# Patient Record
Sex: FEMALE | Race: WHITE | NOT HISPANIC OR LATINO | Employment: OTHER | URBAN - METROPOLITAN AREA
[De-identification: names, ages, dates, MRNs, and addresses within clinical notes are randomized per-mention and may not be internally consistent; named-entity substitution may affect disease eponyms.]

---

## 2020-06-04 ENCOUNTER — OFFICE VISIT (OUTPATIENT)
Dept: FAMILY MEDICINE CLINIC | Facility: CLINIC | Age: 75
End: 2020-06-04
Payer: COMMERCIAL

## 2020-06-04 VITALS
DIASTOLIC BLOOD PRESSURE: 72 MMHG | HEIGHT: 64 IN | TEMPERATURE: 99.8 F | HEART RATE: 89 BPM | WEIGHT: 131 LBS | BODY MASS INDEX: 22.36 KG/M2 | SYSTOLIC BLOOD PRESSURE: 110 MMHG | OXYGEN SATURATION: 96 %

## 2020-06-04 DIAGNOSIS — Z13.1 SCREENING FOR DIABETES MELLITUS (DM): ICD-10-CM

## 2020-06-04 DIAGNOSIS — Z13.220 SCREENING FOR HYPERLIPIDEMIA: ICD-10-CM

## 2020-06-04 DIAGNOSIS — Z13.0 SCREENING FOR DEFICIENCY ANEMIA: ICD-10-CM

## 2020-06-04 DIAGNOSIS — Z72.0 TOBACCO USE: Primary | ICD-10-CM

## 2020-06-04 DIAGNOSIS — Z00.00 MEDICARE ANNUAL WELLNESS VISIT, SUBSEQUENT: ICD-10-CM

## 2020-06-04 DIAGNOSIS — Z11.59 NEED FOR HEPATITIS C SCREENING TEST: ICD-10-CM

## 2020-06-04 DIAGNOSIS — Z80.3 FAMILY HISTORY OF BREAST CANCER: ICD-10-CM

## 2020-06-04 PROCEDURE — 99204 OFFICE O/P NEW MOD 45 MIN: CPT | Performed by: FAMILY MEDICINE

## 2020-06-04 PROCEDURE — 3008F BODY MASS INDEX DOCD: CPT | Performed by: FAMILY MEDICINE

## 2020-06-04 PROCEDURE — G0439 PPPS, SUBSEQ VISIT: HCPCS | Performed by: FAMILY MEDICINE

## 2020-06-04 PROCEDURE — 1160F RVW MEDS BY RX/DR IN RCRD: CPT | Performed by: FAMILY MEDICINE

## 2020-06-04 PROCEDURE — 4004F PT TOBACCO SCREEN RCVD TLK: CPT | Performed by: FAMILY MEDICINE

## 2020-06-04 PROCEDURE — 1125F AMNT PAIN NOTED PAIN PRSNT: CPT | Performed by: FAMILY MEDICINE

## 2020-06-04 PROCEDURE — 1170F FXNL STATUS ASSESSED: CPT | Performed by: FAMILY MEDICINE

## 2020-06-04 RX ORDER — CEPHALEXIN 500 MG/1
CAPSULE ORAL
COMMUNITY
Start: 2012-08-11 | End: 2020-06-04

## 2020-06-04 RX ORDER — DOXYCYCLINE HYCLATE 100 MG/1
CAPSULE ORAL
COMMUNITY
Start: 2012-08-13 | End: 2020-06-04

## 2020-06-04 RX ORDER — VARENICLINE TARTRATE 25 MG
KIT ORAL
Qty: 53 TABLET | Refills: 0 | Status: SHIPPED | OUTPATIENT
Start: 2020-06-04 | End: 2021-08-26

## 2020-07-06 DIAGNOSIS — Z72.0 TOBACCO USE: ICD-10-CM

## 2020-07-06 RX ORDER — VARENICLINE TARTRATE 25 MG
KIT ORAL
Qty: 53 TABLET | Refills: 0 | Status: CANCELLED | OUTPATIENT
Start: 2020-07-06

## 2020-07-06 RX ORDER — VARENICLINE TARTRATE 1 MG/1
1 TABLET, FILM COATED ORAL 2 TIMES DAILY
Qty: 60 TABLET | Refills: 5 | Status: SHIPPED | OUTPATIENT
Start: 2020-07-06 | End: 2021-08-26

## 2020-07-17 LAB — HCV AB SER-ACNC: NEGATIVE

## 2021-03-08 ENCOUNTER — TELEPHONE (OUTPATIENT)
Dept: FAMILY MEDICINE CLINIC | Facility: CLINIC | Age: 76
End: 2021-03-08

## 2021-03-08 NOTE — TELEPHONE ENCOUNTER
LM for pt RCB, she is due for an office visit and mammogram, Colonoscopy  Please offer her an appt and ask her is she had tests done and where  Thank you

## 2021-03-18 ENCOUNTER — IMMUNIZATIONS (OUTPATIENT)
Dept: FAMILY MEDICINE CLINIC | Facility: HOSPITAL | Age: 76
End: 2021-03-18

## 2021-03-18 DIAGNOSIS — Z23 ENCOUNTER FOR IMMUNIZATION: Primary | ICD-10-CM

## 2021-03-18 PROCEDURE — 91300 SARS-COV-2 / COVID-19 MRNA VACCINE (PFIZER-BIONTECH) 30 MCG: CPT

## 2021-03-18 PROCEDURE — 0001A SARS-COV-2 / COVID-19 MRNA VACCINE (PFIZER-BIONTECH) 30 MCG: CPT

## 2021-04-08 ENCOUNTER — IMMUNIZATIONS (OUTPATIENT)
Dept: FAMILY MEDICINE CLINIC | Facility: HOSPITAL | Age: 76
End: 2021-04-08

## 2021-04-08 DIAGNOSIS — Z23 ENCOUNTER FOR IMMUNIZATION: Primary | ICD-10-CM

## 2021-04-08 PROCEDURE — 0002A SARS-COV-2 / COVID-19 MRNA VACCINE (PFIZER-BIONTECH) 30 MCG: CPT

## 2021-04-08 PROCEDURE — 91300 SARS-COV-2 / COVID-19 MRNA VACCINE (PFIZER-BIONTECH) 30 MCG: CPT

## 2021-05-25 ENCOUNTER — TELEPHONE (OUTPATIENT)
Dept: FAMILY MEDICINE CLINIC | Facility: CLINIC | Age: 76
End: 2021-05-25

## 2021-05-25 NOTE — TELEPHONE ENCOUNTER
Patient declines to schedule any appointments, including her AWV, mammogram and colonoscopy  She was last seen new patient on 06/04/2020  Will be due for AWV after 06/04/2021

## 2021-06-24 ENCOUNTER — VBI (OUTPATIENT)
Dept: ADMINISTRATIVE | Facility: OTHER | Age: 76
End: 2021-06-24

## 2021-07-29 ENCOUNTER — VBI (OUTPATIENT)
Dept: ADMINISTRATIVE | Facility: OTHER | Age: 76
End: 2021-07-29

## 2021-08-26 ENCOUNTER — OFFICE VISIT (OUTPATIENT)
Dept: FAMILY MEDICINE CLINIC | Facility: CLINIC | Age: 76
End: 2021-08-26
Payer: COMMERCIAL

## 2021-08-26 ENCOUNTER — TELEPHONE (OUTPATIENT)
Dept: FAMILY MEDICINE CLINIC | Facility: CLINIC | Age: 76
End: 2021-08-26

## 2021-08-26 VITALS
HEIGHT: 64 IN | WEIGHT: 143.2 LBS | SYSTOLIC BLOOD PRESSURE: 110 MMHG | DIASTOLIC BLOOD PRESSURE: 72 MMHG | BODY MASS INDEX: 24.45 KG/M2 | OXYGEN SATURATION: 99 % | HEART RATE: 81 BPM

## 2021-08-26 DIAGNOSIS — M79.604 PAIN IN RIGHT LEG: ICD-10-CM

## 2021-08-26 DIAGNOSIS — Z13.220 LIPID SCREENING: ICD-10-CM

## 2021-08-26 DIAGNOSIS — R63.5 WEIGHT GAIN: ICD-10-CM

## 2021-08-26 DIAGNOSIS — Z80.3 FAMILY HISTORY OF BREAST CANCER: ICD-10-CM

## 2021-08-26 DIAGNOSIS — G62.9 NEUROPATHY: ICD-10-CM

## 2021-08-26 DIAGNOSIS — Z00.00 MEDICARE ANNUAL WELLNESS VISIT, SUBSEQUENT: ICD-10-CM

## 2021-08-26 DIAGNOSIS — L03.115 CELLULITIS OF RIGHT LOWER EXTREMITY: Primary | ICD-10-CM

## 2021-08-26 DIAGNOSIS — R25.2 LEG CRAMPS: ICD-10-CM

## 2021-08-26 PROCEDURE — 1160F RVW MEDS BY RX/DR IN RCRD: CPT | Performed by: FAMILY MEDICINE

## 2021-08-26 PROCEDURE — 1170F FXNL STATUS ASSESSED: CPT | Performed by: FAMILY MEDICINE

## 2021-08-26 PROCEDURE — 3725F SCREEN DEPRESSION PERFORMED: CPT | Performed by: FAMILY MEDICINE

## 2021-08-26 PROCEDURE — 3288F FALL RISK ASSESSMENT DOCD: CPT | Performed by: FAMILY MEDICINE

## 2021-08-26 PROCEDURE — 1101F PT FALLS ASSESS-DOCD LE1/YR: CPT | Performed by: FAMILY MEDICINE

## 2021-08-26 PROCEDURE — 1125F AMNT PAIN NOTED PAIN PRSNT: CPT | Performed by: FAMILY MEDICINE

## 2021-08-26 PROCEDURE — 99214 OFFICE O/P EST MOD 30 MIN: CPT | Performed by: FAMILY MEDICINE

## 2021-08-26 PROCEDURE — 3008F BODY MASS INDEX DOCD: CPT | Performed by: FAMILY MEDICINE

## 2021-08-26 PROCEDURE — G0439 PPPS, SUBSEQ VISIT: HCPCS | Performed by: FAMILY MEDICINE

## 2021-08-26 PROCEDURE — 1036F TOBACCO NON-USER: CPT | Performed by: FAMILY MEDICINE

## 2021-08-26 RX ORDER — DOXYCYCLINE HYCLATE 100 MG/1
100 CAPSULE ORAL EVERY 12 HOURS SCHEDULED
Qty: 20 CAPSULE | Refills: 0 | Status: SHIPPED | OUTPATIENT
Start: 2021-08-26 | End: 2021-09-05

## 2021-08-26 NOTE — PATIENT INSTRUCTIONS
Medicare Preventive Visit Patient Instructions  Thank you for completing your Welcome to Medicare Visit or Medicare Annual Wellness Visit today  Your next wellness visit will be due in one year (8/27/2022)  The screening/preventive services that you may require over the next 5-10 years are detailed below  Some tests may not apply to you based off risk factors and/or age  Screening tests ordered at today's visit but not completed yet may show as past due  Also, please note that scanned in results may not display below  Preventive Screenings:  Service Recommendations Previous Testing/Comments   Colorectal Cancer Screening  * Colonoscopy    * Fecal Occult Blood Test (FOBT)/Fecal Immunochemical Test (FIT)  * Fecal DNA/Cologuard Test  * Flexible Sigmoidoscopy Age: 54-65 years old   Colonoscopy: every 10 years (may be performed more frequently if at higher risk)  OR  FOBT/FIT: every 1 year  OR  Cologuard: every 3 years  OR  Sigmoidoscopy: every 5 years  Screening may be recommended earlier than age 48 if at higher risk for colorectal cancer  Also, an individualized decision between you and your healthcare provider will decide whether screening between the ages of 74-80 would be appropriate  Colonoscopy: Not on file  FOBT/FIT: Not on file  Cologuard: Not on file  Sigmoidoscopy: Not on file          Breast Cancer Screening Age: 36 years old  Frequency: every 1-2 years  Not required if history of left and right mastectomy Mammogram: Not on file        Cervical Cancer Screening Between the ages of 21-29, pap smear recommended once every 3 years  Between the ages of 33-67, can perform pap smear with HPV co-testing every 5 years     Recommendations may differ for women with a history of total hysterectomy, cervical cancer, or abnormal pap smears in past  Pap Smear: Not on file    Screening Not Indicated   Hepatitis C Screening Once for adults born between [facility]  More frequently in patients at high risk for Hepatitis C Hep C Antibody: 07/17/2020    Screening Current   Diabetes Screening 1-2 times per year if you're at risk for diabetes or have pre-diabetes Fasting glucose: No results in last 5 years   A1C: No results in last 5 years        Cholesterol Screening Once every 5 years if you don't have a lipid disorder  May order more often based on risk factors  Lipid panel: 07/17/2020    Screening Current     Other Preventive Screenings Covered by Medicare:  1  Abdominal Aortic Aneurysm (AAA) Screening: covered once if your at risk  You're considered to be at risk if you have a family history of AAA  2  Lung Cancer Screening: covers low dose CT scan once per year if you meet all of the following conditions: (1) Age 50-69; (2) No signs or symptoms of lung cancer; (3) Current smoker or have quit smoking within the last 15 years; (4) You have a tobacco smoking history of at least 30 pack years (packs per day multiplied by number of years you smoked); (5) You get a written order from a healthcare provider  3  Glaucoma Screening: covered annually if you're considered high risk: (1) You have diabetes OR (2) Family history of glaucoma OR (3)  aged 48 and older OR (3)  American aged 72 and older  3  Osteoporosis Screening: covered every 2 years if you meet one of the following conditions: (1) You're estrogen deficient and at risk for osteoporosis based off medical history and other findings; (2) Have a vertebral abnormality; (3) On glucocorticoid therapy for more than 3 months; (4) Have primary hyperparathyroidism; (5) On osteoporosis medications and need to assess response to drug therapy  · Last bone density test (DXA Scan): Not on file  5  HIV Screening: covered annually if you're between the age of 12-76  Also covered annually if you are younger than 13 and older than 72 with risk factors for HIV infection   For pregnant patients, it is covered up to 3 times per pregnancy  Immunizations:  Immunization Recommendations   Influenza Vaccine Annual influenza vaccination during flu season is recommended for all persons aged >= 6 months who do not have contraindications   Pneumococcal Vaccine (Prevnar and Pneumovax)  * Prevnar = PCV13  * Pneumovax = PPSV23   Adults 25-60 years old: 1-3 doses may be recommended based on certain risk factors  Adults 72 years old: Prevnar (PCV13) vaccine recommended followed by Pneumovax (PPSV23) vaccine  If already received PPSV23 since turning 65, then PCV13 recommended at least one year after PPSV23 dose  Hepatitis B Vaccine 3 dose series if at intermediate or high risk (ex: diabetes, end stage renal disease, liver disease)   Tetanus (Td) Vaccine - COST NOT COVERED BY MEDICARE PART B Following completion of primary series, a booster dose should be given every 10 years to maintain immunity against tetanus  Td may also be given as tetanus wound prophylaxis  Tdap Vaccine - COST NOT COVERED BY MEDICARE PART B Recommended at least once for all adults  For pregnant patients, recommended with each pregnancy  Shingles Vaccine (Shingrix) - COST NOT COVERED BY MEDICARE PART B  2 shot series recommended in those aged 48 and above     Health Maintenance Due:      Topic Date Due    Breast Cancer Screening: Mammogram  Never done    Colorectal Cancer Screening  Never done    Hepatitis C Screening  Completed     Immunizations Due:      Topic Date Due    Pneumococcal Vaccine: 65+ Years (1 of 1 - PPSV23) Never done    DTaP,Tdap,and Td Vaccines (2 - Td or Tdap) 10/15/2020    Influenza Vaccine (1) 09/01/2021     Advance Directives   What are advance directives? Advance directives are legal documents that state your wishes and plans for medical care  These plans are made ahead of time in case you lose your ability to make decisions for yourself   Advance directives can apply to any medical decision, such as the treatments you want, and if you want to donate organs  What are the types of advance directives? There are many types of advance directives, and each state has rules about how to use them  You may choose a combination of any of the following:  · Living will: This is a written record of the treatment you want  You can also choose which treatments you do not want, which to limit, and which to stop at a certain time  This includes surgery, medicine, IV fluid, and tube feedings  · Durable power of  for healthcare [facility]): This is a written record that states who you want to make healthcare choices for you when you are unable to make them for yourself  This person, called a proxy, is usually a family member or a friend  You may choose more than 1 proxy  · Do not resuscitate (DNR) order:  A DNR order is used in case your heart stops beating or you stop breathing  It is a request not to have certain forms of treatment, such as CPR  A DNR order may be included in other types of advance directives  · Medical directive: This covers the care that you want if you are in a coma, near death, or unable to make decisions for yourself  You can list the treatments you want for each condition  Treatment may include pain medicine, surgery, blood transfusions, dialysis, IV or tube feedings, and a ventilator (breathing machine)  · Values history: This document has questions about your views, beliefs, and how you feel and think about life  This information can help others choose the care that you would choose  Why are advance directives important? An advance directive helps you control your care  Although spoken wishes may be used, it is better to have your wishes written down  Spoken wishes can be misunderstood, or not followed  Treatments may be given even if you do not want them  An advance directive may make it easier for your family to make difficult choices about your care        © Copyright [employer] 2018 Information is for End User's use only and may not be sold, redistributed or otherwise used for commercial purposes  All illustrations and images included in CareNotes® are the copyrighted property of [employer] , [employer]  or [facility] Preventive Visit Patient Instructions  Thank you for completing your Welcome to Medicare Visit or Medicare Annual Wellness Visit today  Your next wellness visit will be due in one year (8/27/2022)  The screening/preventive services that you may require over the next 5-10 years are detailed below  Some tests may not apply to you based off risk factors and/or age  Screening tests ordered at today's visit but not completed yet may show as past due  Also, please note that scanned in results may not display below  Preventive Screenings:  Service Recommendations Previous Testing/Comments   Colorectal Cancer Screening  * Colonoscopy    * Fecal Occult Blood Test (FOBT)/Fecal Immunochemical Test (FIT)  * Fecal DNA/Cologuard Test  * Flexible Sigmoidoscopy Age: 54-65 years old   Colonoscopy: every 10 years (may be performed more frequently if at higher risk)  OR  FOBT/FIT: every 1 year  OR  Cologuard: every 3 years  OR  Sigmoidoscopy: every 5 years  Screening may be recommended earlier than age 48 if at higher risk for colorectal cancer  Also, an individualized decision between you and your healthcare provider will decide whether screening between the ages of 74-80 would be appropriate  Colonoscopy: Not on file  FOBT/FIT: Not on file  Cologuard: Not on file  Sigmoidoscopy: Not on file          Breast Cancer Screening Age: 36 years old  Frequency: every 1-2 years  Not required if history of left and right mastectomy Mammogram: Not on file        Cervical Cancer Screening Between the ages of 21-29, pap smear recommended once every 3 years  Between the ages of 33-67, can perform pap smear with HPV co-testing every 5 years     Recommendations may differ for women with a history of total hysterectomy, cervical cancer, or abnormal pap smears in past  Pap Smear: Not on file    Screening Not Indicated   Hepatitis C Screening Once for adults born between 1945 and 1965  More frequently in patients at high risk for Hepatitis C Hep C Antibody: 07/17/2020    Screening Current   Diabetes Screening 1-2 times per year if you're at risk for diabetes or have pre-diabetes Fasting glucose: No results in last 5 years   A1C: No results in last 5 years        Cholesterol Screening Once every 5 years if you don't have a lipid disorder  May order more often based on risk factors  Lipid panel: 07/17/2020    Screening Current     Other Preventive Screenings Covered by Medicare:  6  Abdominal Aortic Aneurysm (AAA) Screening: covered once if your at risk  You're considered to be at risk if you have a family history of AAA  7  Lung Cancer Screening: covers low dose CT scan once per year if you meet all of the following conditions: (1) Age 50-69; (2) No signs or symptoms of lung cancer; (3) Current smoker or have quit smoking within the last 15 years; (4) You have a tobacco smoking history of at least 30 pack years (packs per day multiplied by number of years you smoked); (5) You get a written order from a healthcare provider  8  Glaucoma Screening: covered annually if you're considered high risk: (1) You have diabetes OR (2) Family history of glaucoma OR (3)  aged 48 and older OR (3)  American aged 72 and older  5  Osteoporosis Screening: covered every 2 years if you meet one of the following conditions: (1) You're estrogen deficient and at risk for osteoporosis based off medical history and other findings; (2) Have a vertebral abnormality; (3) On glucocorticoid therapy for more than 3 months; (4) Have primary hyperparathyroidism; (5) On osteoporosis medications and need to assess response to drug therapy  · Last bone density test (DXA Scan): Not on file  10  HIV Screening: covered annually if you're between the age of 12-76  Also covered annually if you are younger than 13 and older than 72 with risk factors for HIV infection  For pregnant patients, it is covered up to 3 times per pregnancy  Immunizations:  Immunization Recommendations   Influenza Vaccine Annual influenza vaccination during flu season is recommended for all persons aged >= 6 months who do not have contraindications   Pneumococcal Vaccine (Prevnar and Pneumovax)  * Prevnar = PCV13  * Pneumovax = PPSV23   Adults 25-60 years old: 1-3 doses may be recommended based on certain risk factors  Adults 72 years old: Prevnar (PCV13) vaccine recommended followed by Pneumovax (PPSV23) vaccine  If already received PPSV23 since turning 65, then PCV13 recommended at least one year after PPSV23 dose  Hepatitis B Vaccine 3 dose series if at intermediate or high risk (ex: diabetes, end stage renal disease, liver disease)   Tetanus (Td) Vaccine - COST NOT COVERED BY MEDICARE PART B Following completion of primary series, a booster dose should be given every 10 years to maintain immunity against tetanus  Td may also be given as tetanus wound prophylaxis  Tdap Vaccine - COST NOT COVERED BY MEDICARE PART B Recommended at least once for all adults  For pregnant patients, recommended with each pregnancy  Shingles Vaccine (Shingrix) - COST NOT COVERED BY MEDICARE PART B  2 shot series recommended in those aged 48 and above     Health Maintenance Due:      Topic Date Due    Breast Cancer Screening: Mammogram  Never done    Colorectal Cancer Screening  Never done    Hepatitis C Screening  Completed     Immunizations Due:      Topic Date Due    Pneumococcal Vaccine: 65+ Years (1 of 1 - PPSV23) Never done    DTaP,Tdap,and Td Vaccines (2 - Td or Tdap) 10/15/2020    Influenza Vaccine (1) 09/01/2021     Advance Directives   What are advance directives? Advance directives are legal documents that state your wishes and plans for medical care   These plans are made ahead of time in case you lose your ability to make decisions for yourself  Advance directives can apply to any medical decision, such as the treatments you want, and if you want to donate organs  What are the types of advance directives? There are many types of advance directives, and each state has rules about how to use them  You may choose a combination of any of the following:  · Living will: This is a written record of the treatment you want  You can also choose which treatments you do not want, which to limit, and which to stop at a certain time  This includes surgery, medicine, IV fluid, and tube feedings  · Durable power of  for healthcare [facility]): This is a written record that states who you want to make healthcare choices for you when you are unable to make them for yourself  This person, called a proxy, is usually a family member or a friend  You may choose more than 1 proxy  · Do not resuscitate (DNR) order:  A DNR order is used in case your heart stops beating or you stop breathing  It is a request not to have certain forms of treatment, such as CPR  A DNR order may be included in other types of advance directives  · Medical directive: This covers the care that you want if you are in a coma, near death, or unable to make decisions for yourself  You can list the treatments you want for each condition  Treatment may include pain medicine, surgery, blood transfusions, dialysis, IV or tube feedings, and a ventilator (breathing machine)  · Values history: This document has questions about your views, beliefs, and how you feel and think about life  This information can help others choose the care that you would choose  Why are advance directives important? An advance directive helps you control your care  Although spoken wishes may be used, it is better to have your wishes written down  Spoken wishes can be misunderstood, or not followed  Treatments may be given even if you do not want them   An advance directive may make it easier for your family to make difficult choices about your care  © Copyright [employer] 2018 Information is for End User's use only and may not be sold, redistributed or otherwise used for commercial purposes   All illustrations and images included in CareNotes® are the copyrighted property of A D A M , Inc  or [address] [place] [address]

## 2021-08-26 NOTE — PROGRESS NOTES
Assessment and Plan:     Problem List Items Addressed This Visit     None      Visit Diagnoses     Medicare annual wellness visit, subsequent    -  Primary           Preventive health issues were discussed with patient, and age appropriate screening tests were ordered as noted in patient's After Visit Summary  Personalized health advice and appropriate referrals for health education or preventive services given if needed, as noted in patient's After Visit Summary  History of Present Illness:     Patient presents for Medicare Annual Wellness visit    Patient Care Team:  Dirk Kerr MD as PCP - General (Family Medicine)  Dirk Kerr MD as PCP - [address] [place] [address] (RTE)  [facility]     Problem List:     Patient Active Problem List   Diagnosis    Family history of breast cancer      Past Medical and Surgical History:     History reviewed  No pertinent past medical history  Past Surgical History:   Procedure Laterality Date    HYSTERECTOMY        Family History:     Family History   Problem Relation Age of Onset    Cancer Mother     Emphysema Father     Breast cancer Sister     Cancer Sister       Social History:     Social History     Socioeconomic History    Marital status: /Civil Union     Spouse name: None    Number of children: None    Years of education: None    Highest education level: None   Occupational History    None   Tobacco Use    Smoking status: Former Smoker     Packs/day: 0 25     Types: Cigarettes    Smokeless tobacco: Never Used   Vaping Use    Vaping Use: Never used   Substance and Sexual Activity    Alcohol use:  Yes    Drug use: Never    Sexual activity: None   Other Topics Concern    None   Social History Narrative    None     Social Determinants of Health     Financial Resource Strain:     Difficulty of Paying Living Expenses:    Food Insecurity:     Worried About Running Out of Food in the Last Year:     [address] in the Last Year:    Transportation Needs:     Lack of Transportation (Medical):  Lack of Transportation (Non-Medical):    Physical Activity:     Days of Exercise per Week:     Minutes of Exercise per Session:    Stress:     Feeling of Stress :    Social Connections:     Frequency of Communication with Friends and Family:     Frequency of Social Gatherings with Friends and Family:     Attends [employer] Services:     Active Member of Clubs or Organizations:     Attends Club or Organization Meetings:     Marital Status:    Intimate Partner Violence:     Fear of Current or Ex-Partner:     Emotionally Abused:     Physically Abused:     Sexually Abused:       Medications and Allergies:     No current outpatient medications on file  No current facility-administered medications for this visit  Allergies   Allergen Reactions    Penicillins Other (See Comments), Swelling and Rash     Other reaction(s): swelling      Cephalexin Hives    Sulfa Antibiotics Hives      Immunizations:     Immunization History   Administered Date(s) Administered    SARS-CoV-2 / COVID-19 mRNA IM (Pfizer-BioNTech) 03/18/2021, 04/08/2021    Tdap 10/15/2010      Health Maintenance:         Topic Date Due    Breast Cancer Screening: Mammogram  Never done    Colorectal Cancer Screening  Never done    Hepatitis C Screening  Completed         Topic Date Due    Pneumococcal Vaccine: 65+ Years (1 of 1 - PPSV23) Never done    DTaP,Tdap,and Td Vaccines (2 - Td or Tdap) 10/15/2020    Influenza Vaccine (1) 09/01/2021      Medicare Health Risk Assessment:     /72 (BP Location: Left arm, Patient Position: Sitting, Cuff Size: Adult)   Pulse 81   Ht 5' 4" (1 626 m)   Wt 65 kg (143 lb 3 2 oz)   SpO2 99%   BMI 24 58 kg/m²      Jailyn Milian is here for her Subsequent Wellness visit  Last Medicare Wellness visit information reviewed, patient interviewed and updates made to the record today        Health Risk Assessment:   Patient rates overall health as very good  Patient feels that their physical health rating is same  Patient is very satisfied with their life  Eyesight was rated as same  Hearing was rated as slightly worse  Patient feels that their emotional and mental health rating is same  Patients states they are never, rarely angry  Patient states they are never, rarely unusually tired/fatigued  Pain experienced in the last 7 days has been some  Patient's pain rating has been 6/10  Patient states that she has experienced weight loss or gain in last 6 months  Depression Screening:   PHQ-2 Score: 0      Fall Risk Screening: In the past year, patient has experienced: no history of falling in past year      Urinary Incontinence Screening:   Patient has not leaked urine accidently in the last six months  Home Safety:  Patient does not have trouble with stairs inside or outside of their home  Patient has working smoke alarms and has working carbon monoxide detector  Home safety hazards include: none  Nutrition:   Current diet is Regular  Medications:   Patient is not currently taking any over-the-counter supplements  Patient is not able to manage medications  Activities of Daily Living (ADLs)/Instrumental Activities of Daily Living (IADLs):   Walk and transfer into and out of bed and chair?: Yes  Dress and groom yourself?: Yes    Bathe or shower yourself?: Yes    Feed yourself? Yes  Do your laundry/housekeeping?: Yes  Manage your money, pay your bills and track your expenses?: Yes  Make your own meals?: Yes    Do your own shopping?: Yes    Previous Hospitalizations:   Any hospitalizations or ED visits within the last 12 months?: No      Advance Care Planning:   Living will: Yes    Durable POA for healthcare: Yes    Advanced directive: Yes    Advanced directive counseling given: Yes    Five wishes given: No    End of Life Decisions reviewed with patient: Yes    Provider agrees with end of life decisions:  Yes PREVENTIVE SCREENINGS      Cardiovascular Screening:    General: Screening Current      Diabetes Screening:     General: Risks and Benefits Discussed    Due for: Blood Glucose      Colorectal Cancer Screening:     General: Risks and Benefits Discussed and Patient Declines      Breast Cancer Screening:     General: Risks and Benefits Discussed and Patient Declines      Cervical Cancer Screening:    General: Screening Not Indicated      Osteoporosis Screening:    General: Risks and Benefits Discussed and Patient Declines      Abdominal Aortic Aneurysm (AAA) Screening:        General: Risks and Benefits Discussed and Patient Declines      Lung Cancer Screening:     General: Screening Not Indicated      Hepatitis C Screening:    General: Screening Current    Screening, Brief Intervention, and Referral to Treatment (SBIRT)    Screening  Typical number of drinks in a day: 0  Typical number of drinks in a week: 3  Interpretation: Low risk drinking behavior  Single Item Drug Screening:  How often have you used an illegal drug (including marijuana) or a prescription medication for non-medical reasons in the past year? never    Single Item Drug Screen Score: 0  Interpretation: Negative screen for possible drug use disorder    Other Counseling Topics:   Car/seat belt/driving safety         Dirk Kerr MD

## 2021-08-26 NOTE — PROGRESS NOTES
Assessment/Plan:   we will call the results of her testing  Patient refuses any other form of screening saying she only goes to doctors when she needs to  Problem List Items Addressed This Visit        Other    Family history of breast cancer       Patient refuses mammogram           Other Visit Diagnoses     Cellulitis of right lower extremity    -  Primary    Relevant Medications    doxycycline hyclate (VIBRAMYCIN) 100 mg capsule    Other Relevant Orders    Lyme Total Antibody Profile with reflex to WB    VAS lower limb venous duplex study, complete bilateral    Medicare annual wellness visit, subsequent        Pain in right leg        Relevant Orders    VAS lower limb venous duplex study, complete bilateral    Leg cramps        Relevant Orders    VAS lower limb venous duplex study, complete bilateral    Neuropathy        Relevant Orders    Hemoglobin A1C    CBC and differential    Comprehensive metabolic panel    Protein electrophoresis, serum    Sedimentation rate, automated    Weight gain        Relevant Orders    TSH, 3rd generation with Free T4 reflex    Lipid screening        Relevant Orders    Lipid panel            Subjective:      Patient ID: Gudelia Boone is a 70 y o  female  Patient comes in with pain and cramping right lower extremity  In last 2 weeks she has developed a red patch over her right shin  She also developed numbness of her right foot  She is concerned regarding weight gain that has occurred since she quit smoking about 1 year ago        The following portions of the patient's history were reviewed and updated as appropriate:   Past Medical History:  She has no past medical history on file ,  _______________________________________________________________________  Medical Problems:  does not have any pertinent problems on file ,  _______________________________________________________________________  Past Surgical History:   has a past surgical history that includes Hysterectomy  ,  _______________________________________________________________________  Family History:  family history includes Breast cancer in her sister; Cancer in her mother and sister; Emphysema in her father ,  _______________________________________________________________________  Social History:   reports that she has quit smoking  Her smoking use included cigarettes  She smoked 0 25 packs per day  She has never used smokeless tobacco  She reports current alcohol use  She reports that she does not use drugs  ,  _______________________________________________________________________  Allergies:  is allergic to penicillins, cephalexin, and sulfa antibiotics     _______________________________________________________________________  Current Outpatient Medications   Medication Sig Dispense Refill    doxycycline hyclate (VIBRAMYCIN) 100 mg capsule Take 1 capsule (100 mg total) by mouth every 12 (twelve) hours for 10 days 20 capsule 0     No current facility-administered medications for this visit      _______________________________________________________________________  Review of Systems   Constitutional: Negative  Cardiovascular: Negative  Musculoskeletal: Negative for back pain and gait problem  Objective:  Vitals:    08/26/21 1352   BP: 110/72   BP Location: Left arm   Patient Position: Sitting   Cuff Size: Adult   Pulse: 81   SpO2: 99%   Weight: 65 kg (143 lb 3 2 oz)   Height: 5' 4" (1 626 m)     Body mass index is 24 58 kg/m²  Physical Exam  Constitutional:       Appearance: Normal appearance  She is well-developed  HENT:      Head: Normocephalic and atraumatic  Eyes:      Pupils: Pupils are equal, round, and reactive to light  Neck:      Thyroid: No thyromegaly  Cardiovascular:      Rate and Rhythm: Normal rate and regular rhythm  Heart sounds: Normal heart sounds  Pulmonary:      Effort: Pulmonary effort is normal       Breath sounds: Normal breath sounds  Abdominal:      Palpations: Abdomen is soft  Musculoskeletal:         General: Normal range of motion  Cervical back: Neck supple  Comments:  Red warm area right shin  Dorsalis pedis and posterior tibial pulses 0/3 right foot 1/3 left foot  Neurological:      Mental Status: She is alert and oriented to person, place, and time

## 2021-10-05 ENCOUNTER — HOSPITAL ENCOUNTER (OUTPATIENT)
Dept: NON INVASIVE DIAGNOSTICS | Facility: CLINIC | Age: 76
Discharge: HOME | End: 2021-10-05
Payer: COMMERCIAL

## 2021-10-05 DIAGNOSIS — R25.2 LEG CRAMPS: ICD-10-CM

## 2021-10-05 DIAGNOSIS — L03.115 CELLULITIS OF RIGHT LOWER EXTREMITY: ICD-10-CM

## 2021-10-05 DIAGNOSIS — M79.604 PAIN IN RIGHT LEG: ICD-10-CM

## 2021-10-05 PROCEDURE — 93970 EXTREMITY STUDY: CPT

## 2021-10-05 PROCEDURE — 93970 EXTREMITY STUDY: CPT | Performed by: SURGERY

## 2021-12-20 ENCOUNTER — TELEPHONE (OUTPATIENT)
Dept: FAMILY MEDICINE CLINIC | Facility: CLINIC | Age: 76
End: 2021-12-20

## 2021-12-21 ENCOUNTER — TELEMEDICINE (OUTPATIENT)
Dept: FAMILY MEDICINE CLINIC | Facility: CLINIC | Age: 76
End: 2021-12-21
Payer: COMMERCIAL

## 2021-12-21 DIAGNOSIS — B34.9 VIRAL SYNDROME: Primary | ICD-10-CM

## 2021-12-21 PROCEDURE — 1160F RVW MEDS BY RX/DR IN RCRD: CPT | Performed by: FAMILY MEDICINE

## 2021-12-21 PROCEDURE — U0005 INFEC AGEN DETEC AMPLI PROBE: HCPCS | Performed by: FAMILY MEDICINE

## 2021-12-21 PROCEDURE — 99213 OFFICE O/P EST LOW 20 MIN: CPT | Performed by: FAMILY MEDICINE

## 2021-12-21 PROCEDURE — 3725F SCREEN DEPRESSION PERFORMED: CPT | Performed by: FAMILY MEDICINE

## 2021-12-21 PROCEDURE — 1036F TOBACCO NON-USER: CPT | Performed by: FAMILY MEDICINE

## 2021-12-21 PROCEDURE — U0003 INFECTIOUS AGENT DETECTION BY NUCLEIC ACID (DNA OR RNA); SEVERE ACUTE RESPIRATORY SYNDROME CORONAVIRUS 2 (SARS-COV-2) (CORONAVIRUS DISEASE [COVID-19]), AMPLIFIED PROBE TECHNIQUE, MAKING USE OF HIGH THROUGHPUT TECHNOLOGIES AS DESCRIBED BY CMS-2020-01-R: HCPCS | Performed by: FAMILY MEDICINE

## 2021-12-21 RX ORDER — DEXTROMETHORPHAN HYDROBROMIDE AND PROMETHAZINE HYDROCHLORIDE 15; 6.25 MG/5ML; MG/5ML
5 SOLUTION ORAL 4 TIMES DAILY PRN
Qty: 240 ML | Refills: 0 | Status: SHIPPED | OUTPATIENT
Start: 2021-12-21

## 2021-12-21 RX ORDER — DOXYCYCLINE HYCLATE 100 MG/1
100 CAPSULE ORAL EVERY 12 HOURS SCHEDULED
Qty: 20 CAPSULE | Refills: 0 | Status: SHIPPED | OUTPATIENT
Start: 2021-12-21 | End: 2021-12-31

## 2021-12-22 LAB — SARS-COV-2 RNA RESP QL NAA+PROBE: NEGATIVE

## 2022-02-02 ENCOUNTER — TELEPHONE (OUTPATIENT)
Dept: FAMILY MEDICINE CLINIC | Facility: CLINIC | Age: 77
End: 2022-02-02

## 2022-02-02 ENCOUNTER — VBI (OUTPATIENT)
Dept: ADMINISTRATIVE | Facility: OTHER | Age: 77
End: 2022-02-02

## 2022-02-02 NOTE — TELEPHONE ENCOUNTER
T/c from pt --- said she is very sick -- has cough with brown mucous, very bad sinus congestion, both of which are leading to her having a hard time breathing  Pt reports she doesn't think she has covid, "its not that kind of sick "    Offered pt a virtual visit with provider on same day (Dr Logan Dove out) -- pt does not have smart phone and can not do virtual - pt also only wants to see Dr Logan Dove, so she declined appt for 2/2 and requested an appt for Thursday when he is back in the office  Told pt message would be sent to Dr Logan Dove on how he would like to proceed  Please advise

## 2022-02-03 ENCOUNTER — TELEMEDICINE (OUTPATIENT)
Dept: FAMILY MEDICINE CLINIC | Facility: CLINIC | Age: 77
End: 2022-02-03
Payer: COMMERCIAL

## 2022-02-03 VITALS — WEIGHT: 143 LBS | HEIGHT: 64 IN | BODY MASS INDEX: 24.41 KG/M2

## 2022-02-03 DIAGNOSIS — B34.9 VIRAL SYNDROME: Primary | ICD-10-CM

## 2022-02-03 PROCEDURE — 1036F TOBACCO NON-USER: CPT | Performed by: FAMILY MEDICINE

## 2022-02-03 PROCEDURE — 3008F BODY MASS INDEX DOCD: CPT | Performed by: FAMILY MEDICINE

## 2022-02-03 PROCEDURE — 1160F RVW MEDS BY RX/DR IN RCRD: CPT | Performed by: FAMILY MEDICINE

## 2022-02-03 PROCEDURE — 99213 OFFICE O/P EST LOW 20 MIN: CPT | Performed by: FAMILY MEDICINE

## 2022-02-03 RX ORDER — AZITHROMYCIN 250 MG/1
TABLET, FILM COATED ORAL
Qty: 6 TABLET | Refills: 0 | Status: SHIPPED | OUTPATIENT
Start: 2022-02-03 | End: 2022-02-07

## 2022-02-03 RX ORDER — METHYLPREDNISOLONE 4 MG/1
TABLET ORAL
Qty: 21 EACH | Refills: 0 | Status: SHIPPED | OUTPATIENT
Start: 2022-02-03

## 2022-02-03 NOTE — PROGRESS NOTES
Virtual Regular Visit    Verification of patient location:    Patient is located in the following state in which I hold an active license [place]      Assessment/Plan:    Problem List Items Addressed This Visit        Other    Viral syndrome - Primary    Relevant Medications    azithromycin (ZITHROMAX) 250 mg tablet    methylPREDNISolone 4 MG tablet therapy pack               Reason for visit is   Chief Complaint   Patient presents with    Cough    Nasal Congestion    Virtual Regular Visit        Encounter provider Rubina Yarbrough MD    Provider [facility] [place] [address] [facility] [address] [facility] [address]  [place] [id number] [place] [address]  [phone]      Recent Visits  Date Type Provider Dept   02/02/22 Telephone Rubina Yarbrough MD [address] [facility] recent visits within past 7 days and meeting all other requirements  Today's Visits  Date Type Provider Dept   02/03/22 Telemedicine Rubina Yarbrough MD [facility] [address] today's visits and meeting all other requirements  Future Appointments  No visits were found meeting these conditions  Showing future appointments within next 150 days and meeting all other requirements       The patient was identified by name and date of birth  Ananth Dukes was informed that this is a telemedicine visit and that the visit is being conducted through [address] [place] [address] Now and patient was informed that this is a secure, HIPAA-compliant platform  She agrees to proceed     My office door was closed  No one else was in the room  She acknowledged consent and understanding of privacy and security of the video platform  The patient has agreed to participate and understands they can discontinue the visit at any time  Patient is aware this is a billable service  Subjective  Ananth Cunningham is a 78 y o  female she has had cough and congestion over 1 month now    She was tested for COVID at last visit and was negative  She was given doxycycline which made her nauseous and she stop taking this  She was given promethazine DM for cough which made her feel out of it  Jazz White HPI     History reviewed  No pertinent past medical history  Past Surgical History:   Procedure Laterality Date    HYSTERECTOMY         Current Outpatient Medications   Medication Sig Dispense Refill    azithromycin (ZITHROMAX) 250 mg tablet Take 2 tablets today then 1 tablet daily x 4 days 6 tablet 0    methylPREDNISolone 4 MG tablet therapy pack Use as directed on package 21 each 0    Promethazine-DM (PHENERGAN-DM) 6 25-15 mg/5 mL oral syrup Take 5 mL by mouth 4 (four) times a day as needed for cough (Patient not taking: Reported on 2/3/2022 ) 240 mL 0     No current facility-administered medications for this visit  Allergies   Allergen Reactions    Penicillins Other (See Comments), Swelling and Rash     Other reaction(s): swelling      Cephalexin Hives    Promethazine Delirium    Sulfa Antibiotics Hives       Review of Systems   Constitutional: Negative  HENT: Positive for congestion  Respiratory: Positive for cough and shortness of breath  Gastrointestinal: Negative  Video Exam    Vitals:    02/03/22 1406   Weight: 64 9 kg (143 lb)   Height: 5' 4" (1 626 m)       Physical Exam  Constitutional:       Appearance: Normal appearance  HENT:      Head: Atraumatic  Pulmonary:      Effort: Pulmonary effort is normal    Neurological:      Mental Status: She is alert  Psychiatric:         Mood and Affect: Mood normal          Behavior: Behavior normal           I spent 15 minutes directly with the patient during this visit    VIRTUAL VISIT [facility] verbally agrees to participate in [employer]   Pt is aware that [employer] could be limited without vital signs or the ability to perform a full hands-on physical Nneka Bass understands she or the provider may request at any time to terminate the video visit and request the patient to seek care or treatment in person

## 2022-03-17 ENCOUNTER — TELEPHONE (OUTPATIENT)
Dept: FAMILY MEDICINE CLINIC | Facility: CLINIC | Age: 77
End: 2022-03-17

## 2022-03-23 ENCOUNTER — TELEPHONE (OUTPATIENT)
Dept: FAMILY MEDICINE CLINIC | Facility: CLINIC | Age: 77
End: 2022-03-23

## 2022-03-23 NOTE — TELEPHONE ENCOUNTER
Called patient - left voicemail notifying patient that she is overdue for colonoscopy and to call back

## 2022-07-15 ENCOUNTER — HOSPITAL ENCOUNTER (OUTPATIENT)
Dept: RADIOLOGY | Facility: HOSPITAL | Age: 77
Discharge: HOME | End: 2022-07-15
Payer: COMMERCIAL

## 2022-07-15 ENCOUNTER — OFFICE VISIT (OUTPATIENT)
Dept: FAMILY MEDICINE CLINIC | Facility: CLINIC | Age: 77
End: 2022-07-15
Payer: COMMERCIAL

## 2022-07-15 VITALS
SYSTOLIC BLOOD PRESSURE: 122 MMHG | HEIGHT: 64 IN | WEIGHT: 143 LBS | TEMPERATURE: 98.5 F | BODY MASS INDEX: 24.41 KG/M2 | DIASTOLIC BLOOD PRESSURE: 78 MMHG | OXYGEN SATURATION: 99 % | HEART RATE: 76 BPM
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
## 2022-07-15 DIAGNOSIS — M25.531 ACUTE WRIST PAIN, RIGHT: ICD-10-CM

## 2022-07-15 PROCEDURE — 73110 X-RAY EXAM OF WRIST: CPT

## 2022-07-15 PROCEDURE — 1160F RVW MEDS BY RX/DR IN RCRD: CPT | Performed by: STUDENT IN AN ORGANIZED HEALTH CARE EDUCATION/TRAINING PROGRAM

## 2022-07-15 PROCEDURE — 99214 OFFICE O/P EST MOD 30 MIN: CPT | Performed by: STUDENT IN AN ORGANIZED HEALTH CARE EDUCATION/TRAINING PROGRAM

## 2022-07-15 PROCEDURE — 73130 X-RAY EXAM OF HAND: CPT

## 2022-07-15 PROCEDURE — 3725F SCREEN DEPRESSION PERFORMED: CPT | Performed by: STUDENT IN AN ORGANIZED HEALTH CARE EDUCATION/TRAINING PROGRAM

## 2022-07-15 PROCEDURE — 73090 X-RAY EXAM OF FOREARM: CPT

## 2022-07-15 NOTE — PROGRESS NOTES
Assessment/Plan:         Problem List Items Addressed This Visit    None     Visit Diagnoses     Fall, initial encounter    -  Primary    Relevant Medications    diclofenac sodium (VOLTAREN) 50 mg EC tablet    Other Relevant Orders    XR wrist 3+ vw right    XR forearm 2 vw right    XR hand 3+ vw right    Acute wrist pain, right        Relevant Medications    diclofenac sodium (VOLTAREN) 50 mg EC tablet    Other Relevant Orders    XR wrist 3+ vw right    XR forearm 2 vw right    XR hand 3+ vw right        Portions of the record may have been created with voice recognition software  Occasional wrong word or "sound a like" substitutions may have occurred due to the inherent limitations of voice recognition software  Read the chart carefully and recognize, using context, where substitutions have occurred  Subjective:      Patient ID: Jose Alfredo Corona is a 78 y o  female  HPI    Patient coming to be seen after fall  Simpson Johnson on her dirt driveway last night after tripping on a garden hose  Denies any loss consciousness  She is having pain in the right forearm, right wrist, and right hand specifically at the base of the right ring and right middle finger  Denies any loss of sensation or weakness  Has not taking any meds    The following portions of the patient's history were reviewed and updated as appropriate:   Past Medical History:  She has no past medical history on file ,  _______________________________________________________________________  Medical Problems:  does not have any pertinent problems on file ,  _______________________________________________________________________  Past Surgical History:   has a past surgical history that includes Hysterectomy  ,  _______________________________________________________________________  Family History:  family history includes Breast cancer in her sister; Cancer in her mother and sister; Emphysema in her father ,  _______________________________________________________________________  Social History:   reports that she has quit smoking  Her smoking use included cigarettes  She smoked 0 25 packs per day  She has never used smokeless tobacco  She reports current alcohol use  She reports that she does not use drugs  ,  _______________________________________________________________________  Allergies:  is allergic to penicillins, cephalexin, promethazine, and sulfa antibiotics     _______________________________________________________________________  Current Outpatient Medications   Medication Sig Dispense Refill    diclofenac sodium (VOLTAREN) 50 mg EC tablet Take 1 tablet (50 mg total) by mouth 2 (two) times a day 60 tablet 0    methylPREDNISolone 4 MG tablet therapy pack Use as directed on package (Patient not taking: Reported on 7/15/2022) 21 each 0    Promethazine-DM (PHENERGAN-DM) 6 25-15 mg/5 mL oral syrup Take 5 mL by mouth 4 (four) times a day as needed for cough (Patient not taking: No sig reported) 240 mL 0     No current facility-administered medications for this visit      _______________________________________________________________________  Review of Systems   Constitutional: Negative for activity change, appetite change, fatigue and fever  HENT: Negative for congestion, rhinorrhea and sore throat  Eyes: Negative for visual disturbance  Respiratory: Negative for cough and shortness of breath  Cardiovascular: Negative for chest pain  Gastrointestinal: Negative for nausea and vomiting  Musculoskeletal: Positive for arthralgias and myalgias  Skin: Positive for color change  Objective:  Vitals:    07/15/22 1413   BP: 122/78   Pulse: 76   Temp: 98 5 °F (36 9 °C)   SpO2: 99%   Weight: 64 9 kg (143 lb)   Height: 5' 4" (1 626 m)     Body mass index is 24 55 kg/m²  Physical Exam  Constitutional:       General: She is not in acute distress  Appearance: Normal appearance   She is not ill-appearing  HENT:      Head: Normocephalic and atraumatic  Right Ear: External ear normal       Left Ear: External ear normal    Musculoskeletal:      Right elbow: Normal       Left elbow: Normal       Right forearm: Swelling and tenderness present  No edema, deformity, lacerations or bony tenderness  Right wrist: Tenderness present  No effusion, lacerations, bony tenderness, snuff box tenderness or crepitus  Normal range of motion  Normal pulse  Right hand: No swelling or deformity  Decreased range of motion  Skin:     Findings: Bruising and ecchymosis present  Neurological:      Mental Status: She is alert  Psychiatric:         Mood and Affect: Mood normal          Behavior: Behavior normal          Thought Content:  Thought content normal

## 2022-07-22 ENCOUNTER — TELEPHONE (OUTPATIENT)
Dept: FAMILY MEDICINE CLINIC | Facility: CLINIC | Age: 77
End: 2022-07-22

## 2022-07-22 NOTE — TELEPHONE ENCOUNTER
T/c from pt  responding to xray results VM - gave pt Dr Lopez Oar advisement/ pt expressed understanding

## 2023-01-31 ENCOUNTER — OFFICE VISIT (OUTPATIENT)
Dept: FAMILY MEDICINE CLINIC | Facility: CLINIC | Age: 78
End: 2023-01-31

## 2023-01-31 VITALS
WEIGHT: 151 LBS | TEMPERATURE: 97.9 F | OXYGEN SATURATION: 98 % | HEART RATE: 78 BPM | DIASTOLIC BLOOD PRESSURE: 68 MMHG | HEIGHT: 64 IN | SYSTOLIC BLOOD PRESSURE: 120 MMHG | BODY MASS INDEX: 25.78 KG/M2

## 2023-01-31 DIAGNOSIS — R21 RASH AND NONSPECIFIC SKIN ERUPTION: Primary | ICD-10-CM

## 2023-01-31 PROBLEM — B34.9 VIRAL SYNDROME: Status: RESOLVED | Noted: 2021-12-21 | Resolved: 2023-01-31

## 2023-01-31 RX ORDER — PREDNISONE 20 MG/1
40 TABLET ORAL DAILY
Qty: 10 TABLET | Refills: 0 | Status: SHIPPED | OUTPATIENT
Start: 2023-01-31 | End: 2023-02-05

## 2023-01-31 RX ORDER — PETROLATUM 0.61 G/G
CREAM TOPICAL AS NEEDED
Qty: 397 G | Refills: 0
Start: 2023-01-31

## 2023-01-31 NOTE — PROGRESS NOTES
Assessment/Plan:          Diagnoses and all orders for this visit:    Rash and nonspecific skin eruption  -     predniSONE 20 mg tablet; Take 2 tablets (40 mg total) by mouth daily for 5 days  -     Skin Protectants, Misc  (eucerin) cream; Apply topically as needed for wound care            Subjective:      Patient ID: Tucker Lopez is a 78 y o  female  Rash  This is a new problem  The current episode started 1 to 4 weeks ago  The problem is unchanged  The affected locations include the chest, left arm, left elbow, left hand, left wrist, left lower leg, right axilla, right arm, right elbow, right hand, right wrist, right fingers and right lower leg  The problem is moderate  The rash is characterized by itchiness, burning, redness, scaling and peeling  It is unknown if there was an exposure to a precipitant  The rash first occurred at home  Pertinent negatives include no anorexia, congestion, cough, decreased physical activity, decreased responsiveness, decreased sleep, drinking less, diarrhea, facial edema, fatigue, fever, itching, joint pain, rhinorrhea, shortness of breath, sore throat or vomiting  Past treatments include topical steroids  The treatment provided no relief  There were no sick contacts  The following portions of the patient's history were reviewed and updated as appropriate:   She has no past medical history on file  ,  does not have any pertinent problems on file  ,   has a past surgical history that includes Hysterectomy  ,  family history includes Breast cancer in her sister; Cancer in her mother and sister; Emphysema in her father  ,   reports that she quit smoking about 2 years ago  Her smoking use included cigarettes  She started smoking about 60 years ago  She smoked an average of  25 packs per day  She has never used smokeless tobacco  She reports current alcohol use  She reports that she does not use drugs  ,  is allergic to penicillins, cephalexin, promethazine, and sulfa antibiotics     Current Outpatient Medications   Medication Sig Dispense Refill   • predniSONE 20 mg tablet Take 2 tablets (40 mg total) by mouth daily for 5 days 10 tablet 0   • Skin Protectants, Misc  (eucerin) cream Apply topically as needed for wound care 397 g 0     No current facility-administered medications for this visit  Review of Systems   Constitutional: Negative for decreased responsiveness, fatigue and fever  HENT: Negative for congestion, rhinorrhea and sore throat  Respiratory: Negative for cough and shortness of breath  Gastrointestinal: Negative for anorexia, diarrhea and vomiting  Musculoskeletal: Negative for joint pain  Skin: Positive for rash  Negative for itching  Objective:  Vitals:    01/31/23 1305   BP: 120/68   BP Location: Left arm   Patient Position: Sitting   Cuff Size: Adult   Pulse: 78   Temp: 97 9 °F (36 6 °C)   TempSrc: Tympanic   SpO2: 98%   Weight: 68 5 kg (151 lb)   Height: 5' 4" (1 626 m)     Body mass index is 25 92 kg/m²  Physical Exam  Constitutional:       Appearance: Normal appearance  Cardiovascular:      Rate and Rhythm: Normal rate  Pulmonary:      Effort: Pulmonary effort is normal    Skin:     General: Skin is warm and dry  Findings: Rash present  Rash is scaling and urticarial    Neurological:      Mental Status: She is alert and oriented to person, place, and time     Psychiatric:         Mood and Affect: Mood normal          Behavior: Behavior normal

## 2023-02-22 ENCOUNTER — TELEPHONE (OUTPATIENT)
Dept: FAMILY MEDICINE CLINIC | Facility: CLINIC | Age: 78
End: 2023-02-22

## 2023-06-27 ENCOUNTER — OFFICE VISIT (OUTPATIENT)
Dept: FAMILY MEDICINE CLINIC | Facility: CLINIC | Age: 78
End: 2023-06-27
Payer: COMMERCIAL

## 2023-06-27 VITALS
SYSTOLIC BLOOD PRESSURE: 128 MMHG | HEART RATE: 75 BPM | BODY MASS INDEX: 25.88 KG/M2 | TEMPERATURE: 95.3 F | DIASTOLIC BLOOD PRESSURE: 84 MMHG | OXYGEN SATURATION: 95 % | WEIGHT: 151.6 LBS | HEIGHT: 64 IN

## 2023-06-27 DIAGNOSIS — B35.1 NAIL FUNGUS: ICD-10-CM

## 2023-06-27 DIAGNOSIS — R21 RASH AND NONSPECIFIC SKIN ERUPTION: Primary | ICD-10-CM

## 2023-06-27 PROCEDURE — 99213 OFFICE O/P EST LOW 20 MIN: CPT | Performed by: PHYSICIAN ASSISTANT

## 2023-06-27 RX ORDER — BETAMETHASONE DIPROPIONATE 0.5 MG/G
CREAM TOPICAL 2 TIMES DAILY
Qty: 30 G | Refills: 0 | Status: SHIPPED | OUTPATIENT
Start: 2023-06-27

## 2023-06-27 RX ORDER — DOXYCYCLINE HYCLATE 100 MG/1
100 CAPSULE ORAL EVERY 12 HOURS SCHEDULED
Qty: 20 CAPSULE | Refills: 0 | Status: SHIPPED | OUTPATIENT
Start: 2023-06-27 | End: 2023-07-07

## 2023-06-27 NOTE — PROGRESS NOTES
Assessment/Plan:          Diagnoses and all orders for this visit:    Rash and nonspecific skin eruption  -     doxycycline hyclate (VIBRAMYCIN) 100 mg capsule; Take 1 capsule (100 mg total) by mouth every 12 (twelve) hours for 10 days  -     betamethasone dipropionate (DIPROSONE) 0 05 % cream; Apply topically 2 (two) times a day    Nail fungus  -     miconazole (Tineacide) 2 % cream; Apply topically 2 (two) times a day            Subjective:      Patient ID: Jack Perez is a 78 y o  female  Rash  This is a chronic problem  The current episode started more than 1 month ago  The problem has been gradually worsening since onset  The affected locations include the left lower leg and right lower leg  The rash is characterized by dryness, itchiness, pain and redness  She was exposed to nothing  The rash first occurred at home  Associated symptoms include itching  Pertinent negatives include no anorexia, congestion, cough, decreased physical activity, decreased responsiveness, decreased sleep, drinking less, diarrhea, facial edema, fatigue, fever, joint pain, rhinorrhea, shortness of breath, sore throat or vomiting  Past treatments include moisturizer  The treatment provided no relief  The following portions of the patient's history were reviewed and updated as appropriate:   She has no past medical history on file  ,  does not have any pertinent problems on file  ,   has a past surgical history that includes Hysterectomy  ,  family history includes Breast cancer in her sister; Cancer in her mother and sister; Emphysema in her father  ,   reports that she quit smoking about 2 years ago  Her smoking use included cigarettes  She started smoking about 60 years ago  She smoked an average of  25 packs per day  She has never used smokeless tobacco  She reports current alcohol use  She reports that she does not use drugs  ,  is allergic to penicillins, cephalexin, promethazine, and sulfa antibiotics     Current Outpatient "Medications   Medication Sig Dispense Refill   • betamethasone dipropionate (DIPROSONE) 0 05 % cream Apply topically 2 (two) times a day 30 g 0   • doxycycline hyclate (VIBRAMYCIN) 100 mg capsule Take 1 capsule (100 mg total) by mouth every 12 (twelve) hours for 10 days 20 capsule 0   • miconazole (Tineacide) 2 % cream Apply topically 2 (two) times a day     • Skin Protectants, Misc  (eucerin) cream Apply topically as needed for wound care 397 g 0     No current facility-administered medications for this visit  Review of Systems   Constitutional: Negative for decreased responsiveness, fatigue and fever  HENT: Negative for congestion, rhinorrhea and sore throat  Respiratory: Negative for cough and shortness of breath  Gastrointestinal: Negative for anorexia, diarrhea and vomiting  Musculoskeletal: Negative for joint pain  Skin: Positive for itching and rash  Objective:  Vitals:    06/27/23 1506   BP: 128/84   BP Location: Left arm   Patient Position: Sitting   Cuff Size: Standard   Pulse: 75   Temp: (!) 95 3 °F (35 2 °C)   TempSrc: Tympanic   SpO2: 95%   Weight: 68 8 kg (151 lb 9 6 oz)   Height: 5' 4\" (1 626 m)     Body mass index is 26 02 kg/m²  Physical Exam  Vitals and nursing note reviewed  Constitutional:       Appearance: Normal appearance  Cardiovascular:      Rate and Rhythm: Normal rate  Pulmonary:      Effort: Pulmonary effort is normal    Skin:     General: Skin is warm  Findings: Rash present  Rash is not crusting, pustular, scaling or urticarial              Comments: Rash  Left great nail with thickening and discoloration   Neurological:      Mental Status: She is alert and oriented to person, place, and time     Psychiatric:         Mood and Affect: Mood normal          Behavior: Behavior normal            "

## 2023-07-24 ENCOUNTER — RA CDI HCC (OUTPATIENT)
Dept: OTHER | Facility: HOSPITAL | Age: 78
End: 2023-07-24

## 2023-07-24 ENCOUNTER — OFFICE VISIT (OUTPATIENT)
Dept: FAMILY MEDICINE CLINIC | Facility: CLINIC | Age: 78
End: 2023-07-24
Payer: COMMERCIAL

## 2023-07-24 VITALS
TEMPERATURE: 97.2 F | DIASTOLIC BLOOD PRESSURE: 76 MMHG | BODY MASS INDEX: 25.27 KG/M2 | SYSTOLIC BLOOD PRESSURE: 126 MMHG | HEART RATE: 80 BPM | HEIGHT: 64 IN | OXYGEN SATURATION: 95 % | WEIGHT: 148 LBS

## 2023-07-24 DIAGNOSIS — H61.20 IMPACTED CERUMEN, UNSPECIFIED LATERALITY: ICD-10-CM

## 2023-07-24 DIAGNOSIS — Z80.3 FAMILY HISTORY OF BREAST CANCER: ICD-10-CM

## 2023-07-24 DIAGNOSIS — E78.5 DYSLIPIDEMIA: ICD-10-CM

## 2023-07-24 DIAGNOSIS — Z00.00 MEDICARE ANNUAL WELLNESS VISIT, SUBSEQUENT: Primary | ICD-10-CM

## 2023-07-24 DIAGNOSIS — L40.9 PSORIASIS: ICD-10-CM

## 2023-07-24 PROBLEM — R21 RASH AND NONSPECIFIC SKIN ERUPTION: Status: RESOLVED | Noted: 2023-01-31 | Resolved: 2023-07-24

## 2023-07-24 PROBLEM — B35.1 NAIL FUNGUS: Status: RESOLVED | Noted: 2023-06-27 | Resolved: 2023-07-24

## 2023-07-24 PROCEDURE — G0439 PPPS, SUBSEQ VISIT: HCPCS | Performed by: FAMILY MEDICINE

## 2023-07-24 PROCEDURE — 99214 OFFICE O/P EST MOD 30 MIN: CPT | Performed by: FAMILY MEDICINE

## 2023-07-24 RX ORDER — TRIAMCINOLONE ACETONIDE 5 MG/G
CREAM TOPICAL 2 TIMES DAILY
Qty: 30 G | Refills: 1 | Status: SHIPPED | OUTPATIENT
Start: 2023-07-24

## 2023-07-24 NOTE — PROGRESS NOTES
[address] [facility] coding opportunities       Chart reviewed, no opportunity found: [id number]        Patients Insurance     Medicare Insurance: [employer]

## 2023-07-24 NOTE — PROGRESS NOTES
Assessment and Plan:   Return visit in 1 year for annual physical.  She refuses any form of preventative screening. Problem List Items Addressed This Visit        Nervous and Auditory    Impacted cerumen     Debrox            Musculoskeletal and Integument    Psoriasis    Relevant Medications    triamcinolone (KENALOG) 0.5 % cream       Other    Family history of breast cancer    Dyslipidemia   Other Visit Diagnoses     Medicare annual wellness visit, subsequent    -  Primary        BMI Counseling: Body mass index is 25.4 kg/m². The BMI is above normal. Nutrition recommendations include decreasing portion sizes and moderation in carbohydrate intake. Exercise recommendations include exercising 3-5 times per week. No pharmacotherapy was ordered. Rationale for BMI follow-up plan is due to patient being overweight or obese. Depression Screening and Follow-up Plan: Patient was screened for depression during today's encounter. They screened negative with a PHQ-2 score of 0. Preventive health issues were discussed with patient, and age appropriate screening tests were ordered as noted in patient's After Visit Summary. Personalized health advice and appropriate referrals for health education or preventive services given if needed, as noted in patient's After Visit Summary. History of Present Illness:     Patient presents for a Medicare Wellness Visit    Patient comes in for annual wellness visit. She complains of persistent rash since he was last seen here. Betamethasone helped but did not resolve the problem. Patient Care Team:  Staci Min MD as PCP - General (Family Medicine)  Staci Min MD as PCP - [address] [place] [address] ([facility])  [facility]     Review of Systems:     Review of Systems   Constitutional: Negative. HENT: Negative. Respiratory: Negative. Cardiovascular: Negative. Gastrointestinal: Negative. Skin: Positive for rash.         Problem List: Patient Active Problem List   Diagnosis   • Family history of breast cancer   • Psoriasis   • Dyslipidemia   • Impacted cerumen      Past Medical and Surgical History:     History reviewed. No pertinent past medical history. Past Surgical History:   Procedure Laterality Date   • HYSTERECTOMY        Family History:     Family History   Problem Relation Age of Onset   • Cancer Mother    • Emphysema Father    • Breast cancer Sister    • Cancer Sister       Social History:     Social History     Socioeconomic History   • Marital status: /Civil Union     Spouse name: None   • Number of children: None   • Years of education: None   • Highest education level: None   Occupational History   • None   Tobacco Use   • Smoking status: Former     Packs/day: 0.25     Types: Cigarettes     Start date:      Quit date:      Years since quittin.5   • Smokeless tobacco: Never   Vaping Use   • Vaping Use: Never used   Substance and Sexual Activity   • Alcohol use: Yes   • Drug use: Never   • Sexual activity: None   Other Topics Concern   • None   Social History Narrative   • None     Social Determinants of Health     Financial Resource Strain: Low Risk  (2023)    Overall Financial Resource Strain (CARDIA)    • Difficulty of Paying Living Expenses: Not hard at all   Food Insecurity: Not on file   Transportation Needs: No Transportation Needs (2023)    PRAPARE - Transportation    • Lack of Transportation (Medical): No    • Lack of Transportation (Non-Medical):  No   Physical Activity: Not on file   Stress: Not on file   Social Connections: Not on file   Intimate Partner Violence: Not on file   Housing Stability: Not on file      Medications and Allergies:     Current Outpatient Medications   Medication Sig Dispense Refill   • miconazole (Tineacide) 2 % cream Apply topically 2 (two) times a day     • Skin Protectants, Misc. (eucerin) cream Apply topically as needed for wound care 397 g 0   • triamcinolone (KENALOG) 0.5 % cream Apply topically 2 (two) times a day 30 g 1     No current facility-administered medications for this visit. Allergies   Allergen Reactions   • Penicillins Other (See Comments), Swelling and Rash     Other reaction(s): swelling     • Cephalexin Hives   • Promethazine Delirium   • Sulfa Antibiotics Hives      Immunizations:     Immunization History   Administered Date(s) Administered   • COVID-19 PFIZER VACCINE 0.3 ML IM 03/18/2021, 04/08/2021   • Tdap 10/15/2010      Health Maintenance:         Topic Date Due   • Colorectal Cancer Screening  07/24/2024 (Originally 11/13/1990)   • Breast Cancer Screening: Mammogram  07/24/2024 (Originally 11/13/1985)   • Hepatitis C Screening  Completed         Topic Date Due   • Pneumococcal Vaccine: 65+ Years (1 - PCV) Never done   • COVID-19 Vaccine (3 - Pfizer series) 06/03/2021   • Influenza Vaccine (1) 09/01/2023      Medicare Screening Tests and Risk Assessments:     Eric Miranda is here for her Subsequent Wellness visit. Last Medicare Wellness visit information reviewed, patient interviewed and updates made to the record today. Health Risk Assessment:   Patient rates overall health as very good. Patient feels that their physical health rating is same. Patient is satisfied with their life. Eyesight was rated as same. Hearing was rated as slightly worse. Patient feels that their emotional and mental health rating is same. Patients states they are sometimes angry. Patient states they are sometimes unusually tired/fatigued. Pain experienced in the last 7 days has been some. Patient's pain rating has been 4/10. Patient states that she has experienced no weight loss or gain in last 6 months. Depression Screening:   PHQ-2 Score: 0      Fall Risk Screening: In the past year, patient has experienced: no history of falling in past year      Urinary Incontinence Screening:   Patient has leaked urine accidently in the last six months.      Home Safety:  Patient does not have trouble with stairs inside or outside of their home. Patient has working smoke alarms and has working carbon monoxide detector. Home safety hazards include: none. Nutrition:   Current diet is Regular. Medications:   Patient is currently taking over-the-counter supplements. OTC medications include: see medication list. Patient is able to manage medications. Activities of Daily Living (ADLs)/Instrumental Activities of Daily Living (IADLs):   Walk and transfer into and out of bed and chair?: Yes  Dress and groom yourself?: Yes    Bathe or shower yourself?: Yes    Feed yourself? Yes  Do your laundry/housekeeping?: Yes  Manage your money, pay your bills and track your expenses?: Yes  Make your own meals?: Yes    Do your own shopping?: Yes    Previous Hospitalizations:   Any hospitalizations or ED visits within the last 12 months?: No      Advance Care Planning:   Living will: Yes    Durable POA for healthcare:  Yes    Advanced directive: Yes    Advanced directive counseling given: Yes    Five wishes given: No    End of Life Decisions reviewed with patient: Yes    Provider agrees with end of life decisions: Yes      PREVENTIVE SCREENINGS      Cardiovascular Screening:    General: Screening Current      Diabetes Screening:     General: Risks and Benefits Discussed    Due for: Blood Glucose      Colorectal Cancer Screening:     General: Patient Declines      Breast Cancer Screening:     General: Patient Declines      Cervical Cancer Screening:    General: Screening Not Indicated      Osteoporosis Screening:    General: Patient Declines      Abdominal Aortic Aneurysm (AAA) Screening:        General: Screening Not Indicated      Lung Cancer Screening:     General: Screening Not Indicated      Hepatitis C Screening:    General: Screening Current    Screening, Brief Intervention, and Referral to Treatment (SBIRT)    Screening  Typical number of drinks in a day: 0  Typical number of drinks in a week: 0  Interpretation: Low risk drinking behavior. Single Item Drug Screening:  How often have you used an illegal drug (including marijuana) or a prescription medication for non-medical reasons in the past year? never    Single Item Drug Screen Score: 0  Interpretation: Negative screen for possible drug use disorder    No results found. Physical Exam:     /76 (BP Location: Left arm, Patient Position: Sitting, Cuff Size: Standard)   Pulse 80   Temp (!) 97.2 °F (36.2 °C)   Ht 5' 4" (1.626 m)   Wt 67.1 kg (148 lb)   SpO2 95%   BMI 25.40 kg/m²     Physical Exam  Vitals and nursing note reviewed. Constitutional:       Appearance: Normal appearance. She is well-developed. HENT:      Head: Normocephalic and atraumatic. Right Ear: Tympanic membrane and ear canal normal. There is impacted cerumen. Left Ear: Tympanic membrane and ear canal normal. There is impacted cerumen. Nose: Nose normal.   Eyes:      Conjunctiva/sclera: Conjunctivae normal.   Cardiovascular:      Rate and Rhythm: Normal rate and regular rhythm. Heart sounds: Normal heart sounds. No murmur heard. Pulmonary:      Effort: Pulmonary effort is normal. No respiratory distress. Breath sounds: Normal breath sounds. Abdominal:      Palpations: Abdomen is soft. There is no mass. Tenderness: There is no abdominal tenderness. Musculoskeletal:         General: No swelling. Cervical back: Neck supple. Skin:     Capillary Refill: Capillary refill takes less than 2 seconds. Comments: Red macular rash right shin with flakiness and scaling   Neurological:      Mental Status: She is alert.    Psychiatric:         Mood and Affect: Mood normal.         Behavior: Behavior normal.          José Miguel Trevino MD

## 2023-07-24 NOTE — PATIENT INSTRUCTIONS
Medicare Preventive Visit Patient Instructions  Thank you for completing your Welcome to Medicare Visit or Medicare Annual Wellness Visit today. Your next wellness visit will be due in one year (7/24/2024). The screening/preventive services that you may require over the next 5-10 years are detailed below. Some tests may not apply to you based off risk factors and/or age. Screening tests ordered at today's visit but not completed yet may show as past due. Also, please note that scanned in results may not display below. Preventive Screenings:  Service Recommendations Previous Testing/Comments   Colorectal Cancer Screening  * Colonoscopy    * Fecal Occult Blood Test (FOBT)/Fecal Immunochemical Test (FIT)  * Fecal DNA/Cologuard Test  * Flexible Sigmoidoscopy Age: 43-73 years old   Colonoscopy: every 10 years (may be performed more frequently if at higher risk)  OR  FOBT/FIT: every 1 year  OR  Cologuard: every 3 years  OR  Sigmoidoscopy: every 5 years  Screening may be recommended earlier than age 39 if at higher risk for colorectal cancer. Also, an individualized decision between you and your healthcare provider will decide whether screening between the ages of 77-80 would be appropriate. Colonoscopy: Not on file  FOBT/FIT: Not on file  Cologuard: Not on file  Sigmoidoscopy: Not on file          Breast Cancer Screening Age: 36 years old  Frequency: every 1-2 years  Not required if history of left and right mastectomy Mammogram: Not on file        Cervical Cancer Screening Between the ages of 21-29, pap smear recommended once every 3 years. Between the ages of 32-69, can perform pap smear with HPV co-testing every 5 years.    Recommendations may differ for women with a history of total hysterectomy, cervical cancer, or abnormal pap smears in past. Pap Smear: Not on file    Screening Not Indicated   Hepatitis C Screening Once for adults born between [address] [place] [address]  More frequently in patients at high risk for Hepatitis C Hep C Antibody: 07/17/2020    Screening Current   Diabetes Screening 1-2 times per year if you're at risk for diabetes or have pre-diabetes Fasting glucose: No results in last 5 years (No results in last 5 years)  A1C: 5.2 % (8/30/2021)      Cholesterol Screening Once every 5 years if you don't have a lipid disorder. May order more often based on risk factors. Lipid panel: 08/30/2021    Screening Current     Other Preventive Screenings Covered by Medicare:  1. Abdominal Aortic Aneurysm (AAA) Screening: covered once if your at risk. You're considered to be at risk if you have a family history of AAA. 2. Lung Cancer Screening: covers low dose CT scan once per year if you meet all of the following conditions: (1) Age 48-67; (2) No signs or symptoms of lung cancer; (3) Current smoker or have quit smoking within the last 15 years; (4) You have a tobacco smoking history of at least 20 pack years (packs per day multiplied by number of years you smoked); (5) You get a written order from a healthcare provider. 3. Glaucoma Screening: covered annually if you're considered high risk: (1) You have diabetes OR (2) Family history of glaucoma OR (3)  aged 48 and older OR (3)  American aged 72 and older  3. Osteoporosis Screening: covered every 2 years if you meet one of the following conditions: (1) You're estrogen deficient and at risk for osteoporosis based off medical history and other findings; (2) Have a vertebral abnormality; (3) On glucocorticoid therapy for more than 3 months; (4) Have primary hyperparathyroidism; (5) On osteoporosis medications and need to assess response to drug therapy. · Last bone density test (DXA Scan): Not on file. 5. HIV Screening: covered annually if you're between the age of 14-79. Also covered annually if you are younger than 13 and older than 72 with risk factors for HIV infection.  For pregnant patients, it is covered up to 3 times per pregnancy. Immunizations:  Immunization Recommendations   Influenza Vaccine Annual influenza vaccination during flu season is recommended for all persons aged >= 6 months who do not have contraindications   Pneumococcal Vaccine   * Pneumococcal conjugate vaccine = PCV13 (Prevnar 13), PCV15 (Vaxneuvance), PCV20 (Prevnar 20)  * Pneumococcal polysaccharide vaccine = PPSV23 (Pneumovax) Adults 20-63 years old: 1-3 doses may be recommended based on certain risk factors  Adults 72 years old: 1-2 doses may be recommended based off what pneumonia vaccine you previously received   Hepatitis B Vaccine 3 dose series if at intermediate or high risk (ex: diabetes, end stage renal disease, liver disease)   Tetanus (Td) Vaccine - COST NOT COVERED BY MEDICARE PART B Following completion of primary series, a booster dose should be given every 10 years to maintain immunity against tetanus. Td may also be given as tetanus wound prophylaxis. Tdap Vaccine - COST NOT COVERED BY MEDICARE PART B Recommended at least once for all adults. For pregnant patients, recommended with each pregnancy. Shingles Vaccine (Shingrix) - COST NOT COVERED BY MEDICARE PART B  2 shot series recommended in those aged 48 and above     Health Maintenance Due:      Topic Date Due   • Breast Cancer Screening: Mammogram  Never done   • Colorectal Cancer Screening  Never done   • Hepatitis C Screening  Completed     Immunizations Due:      Topic Date Due   • Pneumococcal Vaccine: 65+ Years (1 - PCV) Never done   • COVID-19 Vaccine (3 - Pfizer series) 06/03/2021   • Influenza Vaccine (1) 09/01/2023     Advance Directives   What are advance directives? Advance directives are legal documents that state your wishes and plans for medical care. These plans are made ahead of time in case you lose your ability to make decisions for yourself. Advance directives can apply to any medical decision, such as the treatments you want, and if you want to donate organs. What are the types of advance directives? There are many types of advance directives, and each state has rules about how to use them. You may choose a combination of any of the following:  · Living will: This is a written record of the treatment you want. You can also choose which treatments you do not want, which to limit, and which to stop at a certain time. This includes surgery, medicine, IV fluid, and tube feedings. · Durable power of  for healthcare [facility]): This is a written record that states who you want to make healthcare choices for you when you are unable to make them for yourself. This person, called a proxy, is usually a family member or a friend. You may choose more than 1 proxy. · Do not resuscitate (DNR) order:  A DNR order is used in case your heart stops beating or you stop breathing. It is a request not to have certain forms of treatment, such as CPR. A DNR order may be included in other types of advance directives. · Medical directive: This covers the care that you want if you are in a coma, near death, or unable to make decisions for yourself. You can list the treatments you want for each condition. Treatment may include pain medicine, surgery, blood transfusions, dialysis, IV or tube feedings, and a ventilator (breathing machine). · Values history: This document has questions about your views, beliefs, and how you feel and think about life. This information can help others choose the care that you would choose. Why are advance directives important? An advance directive helps you control your care. Although spoken wishes may be used, it is better to have your wishes written down. Spoken wishes can be misunderstood, or not followed. Treatments may be given even if you do not want them. An advance directive may make it easier for your family to make difficult choices about your care.    Urinary Incontinence   Urinary incontinence (UI)  is when you lose control of your bladder. UI develops because your bladder cannot store or empty urine properly. The 3 most common types of UI are stress incontinence, urge incontinence, or both. Medicines:   · May be given to help strengthen your bladder control. Report any side effects of medication to your healthcare provider. Do pelvic muscle exercises often:  Your pelvic muscles help you stop urinating. Squeeze these muscles tight for 5 seconds, then relax for 5 seconds. Gradually work up to squeezing for 10 seconds. Do 3 sets of 15 repetitions a day, or as directed. This will help strengthen your pelvic muscles and improve bladder control. Train your bladder:  Go to the bathroom at set times, such as every 2 hours, even if you do not feel the urge to go. You can also try to hold your urine when you feel the urge to go. For example, hold your urine for 5 minutes when you feel the urge to go. As that becomes easier, hold your urine for 10 minutes. Self-care:   · Keep a UI record. Write down how often you leak urine and how much you leak. Make a note of what you were doing when you leaked urine. · Drink liquids as directed. You may need to limit the amount of liquid you drink to help control your urine leakage. Do not drink any liquid right before you go to bed. Limit or do not have drinks that contain caffeine or alcohol. · Prevent constipation. Eat a variety of high-fiber foods. Good examples are high-fiber cereals, beans, vegetables, and whole-grain breads. Walking is the best way to trigger your intestines to have a bowel movement. · Exercise regularly and maintain a healthy weight. Weight loss and exercise will decrease pressure on your bladder and help you control your leakage. · Use a catheter as directed  to help empty your bladder. A catheter is a tiny, plastic tube that is put into your bladder to drain your urine. · Go to behavior therapy as directed.   Behavior therapy may be used to help you learn to control your urge to urinate. Weight Management   Why it is important to manage your weight:  Being overweight increases your risk of health conditions such as heart disease, high blood pressure, type 2 diabetes, and certain types of cancer. It can also increase your risk for osteoarthritis, sleep apnea, and other respiratory problems. Aim for a slow, steady weight loss. Even a small amount of weight loss can lower your risk of health problems. How to lose weight safely:  A safe and healthy way to lose weight is to eat fewer calories and get regular exercise. You can lose up about 1 pound a week by decreasing the number of calories you eat by 500 calories each day. Healthy meal plan for weight management:  A healthy meal plan includes a variety of foods, contains fewer calories, and helps you stay healthy. A healthy meal plan includes the following:  · Eat whole-grain foods more often. A healthy meal plan should contain fiber. Fiber is the part of grains, fruits, and vegetables that is not broken down by your body. Whole-grain foods are healthy and provide extra fiber in your diet. Some examples of whole-grain foods are whole-wheat breads and pastas, oatmeal, brown rice, and bulgur. · Eat a variety of vegetables every day. Include dark, leafy greens such as spinach, kale, ankit greens, and mustard greens. Eat yellow and orange vegetables such as carrots, sweet potatoes, and winter squash. · Eat a variety of fruits every day. Choose fresh or canned fruit (canned in its own juice or light syrup) instead of juice. Fruit juice has very little or no fiber. · Eat low-fat dairy foods. Drink fat-free (skim) milk or 1% milk. Eat fat-free yogurt and low-fat cottage cheese. Try low-fat cheeses such as mozzarella and other reduced-fat cheeses. · Choose meat and other protein foods that are low in fat. Choose beans or other legumes such as split peas or lentils.  Choose fish, skinless poultry (chicken or turkey), or lean cuts of red meat (beef or pork). Before you cook meat or poultry, cut off any visible fat. · Use less fat and oil. Try baking foods instead of frying them. Add less fat, such as margarine, sour cream, regular salad dressing and mayonnaise to foods. Eat fewer high-fat foods. Some examples of high-fat foods include french fries, doughnuts, ice cream, and cakes. · Eat fewer sweets. Limit foods and drinks that are high in sugar. This includes candy, cookies, regular soda, and sweetened drinks. Exercise:  Exercise at least 30 minutes per day on most days of the week. Some examples of exercise include walking, biking, dancing, and swimming. You can also fit in more physical activity by taking the stairs instead of the elevator or parking farther away from stores. Ask your healthcare provider about the best exercise plan for you. © Copyright [employer] 2018 Information is for End User's use only and may not be sold, redistributed or otherwise used for commercial purposes. All illustrations and images included in CareNotes® are the copyrighted property of [employer], [employer]. or [facility] Preventive Visit Patient Instructions  Thank you for completing your Welcome to Medicare Visit or Medicare Annual Wellness Visit today. Your next wellness visit will be due in one year (7/24/2024). The screening/preventive services that you may require over the next 5-10 years are detailed below. Some tests may not apply to you based off risk factors and/or age. Screening tests ordered at today's visit but not completed yet may show as past due. Also, please note that scanned in results may not display below.   Preventive Screenings:  Service Recommendations Previous Testing/Comments   Colorectal Cancer Screening  * Colonoscopy    * Fecal Occult Blood Test (FOBT)/Fecal Immunochemical Test (FIT)  * Fecal DNA/Cologuard Test  * Flexible Sigmoidoscopy Age: 43-73 years old   Colonoscopy: every 10 years (may be performed more frequently if at higher risk)  OR  FOBT/FIT: every 1 year  OR  Cologuard: every 3 years  OR  Sigmoidoscopy: every 5 years  Screening may be recommended earlier than age 39 if at higher risk for colorectal cancer. Also, an individualized decision between you and your healthcare provider will decide whether screening between the ages of 77-80 would be appropriate. Colonoscopy: Not on file  FOBT/FIT: Not on file  Cologuard: Not on file  Sigmoidoscopy: Not on file          Breast Cancer Screening Age: 36 years old  Frequency: every 1-2 years  Not required if history of left and right mastectomy Mammogram: Not on file        Cervical Cancer Screening Between the ages of 21-29, pap smear recommended once every 3 years. Between the ages of 32-69, can perform pap smear with HPV co-testing every 5 years. Recommendations may differ for women with a history of total hysterectomy, cervical cancer, or abnormal pap smears in past. Pap Smear: Not on file    Screening Not Indicated   Hepatitis C Screening Once for adults born between 1945 and 1965  More frequently in patients at high risk for Hepatitis C Hep C Antibody: 07/17/2020    Screening Current   Diabetes Screening 1-2 times per year if you're at risk for diabetes or have pre-diabetes Fasting glucose: No results in last 5 years (No results in last 5 years)  A1C: 5.2 % (8/30/2021)      Cholesterol Screening Once every 5 years if you don't have a lipid disorder. May order more often based on risk factors. Lipid panel: 08/30/2021    Screening Current     Other Preventive Screenings Covered by Medicare:  6. Abdominal Aortic Aneurysm (AAA) Screening: covered once if your at risk. You're considered to be at risk if you have a family history of AAA.   7. Lung Cancer Screening: covers low dose CT scan once per year if you meet all of the following conditions: (1) Age 48-67; (2) No signs or symptoms of lung cancer; (3) Current smoker or have quit smoking within the last 15 years; (4) You have a tobacco smoking history of at least 20 pack years (packs per day multiplied by number of years you smoked); (5) You get a written order from a healthcare provider. 8. Glaucoma Screening: covered annually if you're considered high risk: (1) You have diabetes OR (2) Family history of glaucoma OR (3)  aged 48 and older OR (3)  American aged 72 and older  5. Osteoporosis Screening: covered every 2 years if you meet one of the following conditions: (1) You're estrogen deficient and at risk for osteoporosis based off medical history and other findings; (2) Have a vertebral abnormality; (3) On glucocorticoid therapy for more than 3 months; (4) Have primary hyperparathyroidism; (5) On osteoporosis medications and need to assess response to drug therapy. · Last bone density test (DXA Scan): Not on file. 10. HIV Screening: covered annually if you're between the age of 14-79. Also covered annually if you are younger than 13 and older than 72 with risk factors for HIV infection. For pregnant patients, it is covered up to 3 times per pregnancy.     Immunizations:  Immunization Recommendations   Influenza Vaccine Annual influenza vaccination during flu season is recommended for all persons aged >= 6 months who do not have contraindications   Pneumococcal Vaccine   * Pneumococcal conjugate vaccine = PCV13 (Prevnar 13), PCV15 (Vaxneuvance), PCV20 (Prevnar 20)  * Pneumococcal polysaccharide vaccine = PPSV23 (Pneumovax) Adults 20-63 years old: 1-3 doses may be recommended based on certain risk factors  Adults 72 years old: 1-2 doses may be recommended based off what pneumonia vaccine you previously received   Hepatitis B Vaccine 3 dose series if at intermediate or high risk (ex: diabetes, end stage renal disease, liver disease)   Tetanus (Td) Vaccine - COST NOT COVERED BY MEDICARE PART B Following completion of primary series, a booster dose should be given every 10 years to maintain immunity against tetanus. Td may also be given as tetanus wound prophylaxis. Tdap Vaccine - COST NOT COVERED BY MEDICARE PART B Recommended at least once for all adults. For pregnant patients, recommended with each pregnancy. Shingles Vaccine (Shingrix) - COST NOT COVERED BY MEDICARE PART B  2 shot series recommended in those aged 48 and above     Health Maintenance Due:      Topic Date Due   • Breast Cancer Screening: Mammogram  Never done   • Colorectal Cancer Screening  Never done   • Hepatitis C Screening  Completed     Immunizations Due:      Topic Date Due   • Pneumococcal Vaccine: 65+ Years (1 - PCV) Never done   • COVID-19 Vaccine (3 - Pfizer series) 06/03/2021   • Influenza Vaccine (1) 09/01/2023     Advance Directives   What are advance directives? Advance directives are legal documents that state your wishes and plans for medical care. These plans are made ahead of time in case you lose your ability to make decisions for yourself. Advance directives can apply to any medical decision, such as the treatments you want, and if you want to donate organs. What are the types of advance directives? There are many types of advance directives, and each state has rules about how to use them. You may choose a combination of any of the following:  · Living will: This is a written record of the treatment you want. You can also choose which treatments you do not want, which to limit, and which to stop at a certain time. This includes surgery, medicine, IV fluid, and tube feedings. · Durable power of  for healthcare [facility] [place]): This is a written record that states who you want to make healthcare choices for you when you are unable to make them for yourself. This person, called a proxy, is usually a family member or a friend. You may choose more than 1 proxy. · Do not resuscitate (DNR) order:  A DNR order is used in case your heart stops beating or you stop breathing.  It is a request not to have certain forms of treatment, such as CPR. A DNR order may be included in other types of advance directives. · Medical directive: This covers the care that you want if you are in a coma, near death, or unable to make decisions for yourself. You can list the treatments you want for each condition. Treatment may include pain medicine, surgery, blood transfusions, dialysis, IV or tube feedings, and a ventilator (breathing machine). · Values history: This document has questions about your views, beliefs, and how you feel and think about life. This information can help others choose the care that you would choose. Why are advance directives important? An advance directive helps you control your care. Although spoken wishes may be used, it is better to have your wishes written down. Spoken wishes can be misunderstood, or not followed. Treatments may be given even if you do not want them. An advance directive may make it easier for your family to make difficult choices about your care. Urinary Incontinence   Urinary incontinence (UI)  is when you lose control of your bladder. UI develops because your bladder cannot store or empty urine properly. The 3 most common types of UI are stress incontinence, urge incontinence, or both. Medicines:   · May be given to help strengthen your bladder control. Report any side effects of medication to your healthcare provider. Do pelvic muscle exercises often:  Your pelvic muscles help you stop urinating. Squeeze these muscles tight for 5 seconds, then relax for 5 seconds. Gradually work up to squeezing for 10 seconds. Do 3 sets of 15 repetitions a day, or as directed. This will help strengthen your pelvic muscles and improve bladder control. Train your bladder:  Go to the bathroom at set times, such as every 2 hours, even if you do not feel the urge to go. You can also try to hold your urine when you feel the urge to go.  For example, hold your urine for 5 minutes when you feel the urge to go. As that becomes easier, hold your urine for 10 minutes. Self-care:   · Keep a UI record. Write down how often you leak urine and how much you leak. Make a note of what you were doing when you leaked urine. · Drink liquids as directed. You may need to limit the amount of liquid you drink to help control your urine leakage. Do not drink any liquid right before you go to bed. Limit or do not have drinks that contain caffeine or alcohol. · Prevent constipation. Eat a variety of high-fiber foods. Good examples are high-fiber cereals, beans, vegetables, and whole-grain breads. Walking is the best way to trigger your intestines to have a bowel movement. · Exercise regularly and maintain a healthy weight. Weight loss and exercise will decrease pressure on your bladder and help you control your leakage. · Use a catheter as directed  to help empty your bladder. A catheter is a tiny, plastic tube that is put into your bladder to drain your urine. · Go to behavior therapy as directed. Behavior therapy may be used to help you learn to control your urge to urinate. Weight Management   Why it is important to manage your weight:  Being overweight increases your risk of health conditions such as heart disease, high blood pressure, type 2 diabetes, and certain types of cancer. It can also increase your risk for osteoarthritis, sleep apnea, and other respiratory problems. Aim for a slow, steady weight loss. Even a small amount of weight loss can lower your risk of health problems. How to lose weight safely:  A safe and healthy way to lose weight is to eat fewer calories and get regular exercise. You can lose up about 1 pound a week by decreasing the number of calories you eat by 500 calories each day. Healthy meal plan for weight management:  A healthy meal plan includes a variety of foods, contains fewer calories, and helps you stay healthy.  A healthy meal plan includes the following:  · Eat whole-grain foods more often. A healthy meal plan should contain fiber. Fiber is the part of grains, fruits, and vegetables that is not broken down by your body. Whole-grain foods are healthy and provide extra fiber in your diet. Some examples of whole-grain foods are whole-wheat breads and pastas, oatmeal, brown rice, and bulgur. · Eat a variety of vegetables every day. Include dark, leafy greens such as spinach, kale, ankit greens, and mustard greens. Eat yellow and orange vegetables such as carrots, sweet potatoes, and winter squash. · Eat a variety of fruits every day. Choose fresh or canned fruit (canned in its own juice or light syrup) instead of juice. Fruit juice has very little or no fiber. · Eat low-fat dairy foods. Drink fat-free (skim) milk or 1% milk. Eat fat-free yogurt and low-fat cottage cheese. Try low-fat cheeses such as mozzarella and other reduced-fat cheeses. · Choose meat and other protein foods that are low in fat. Choose beans or other legumes such as split peas or lentils. Choose fish, skinless poultry (chicken or turkey), or lean cuts of red meat (beef or pork). Before you cook meat or poultry, cut off any visible fat. · Use less fat and oil. Try baking foods instead of frying them. Add less fat, such as margarine, sour cream, regular salad dressing and mayonnaise to foods. Eat fewer high-fat foods. Some examples of high-fat foods include french fries, doughnuts, ice cream, and cakes. · Eat fewer sweets. Limit foods and drinks that are high in sugar. This includes candy, cookies, regular soda, and sweetened drinks. Exercise:  Exercise at least 30 minutes per day on most days of the week. Some examples of exercise include walking, biking, dancing, and swimming. You can also fit in more physical activity by taking the stairs instead of the elevator or parking farther away from stores. Ask your healthcare provider about the best exercise plan for you.       © Copyright [employer] 2018 Information is for End User's use only and may not be sold, redistributed or otherwise used for commercial purposes.  All illustrations and images included in CareNotes® are the copyrighted property of [employer].A[employer]M., Inc. or [address] [place] [address]

## 2023-09-05 ENCOUNTER — OFFICE VISIT (OUTPATIENT)
Dept: FAMILY MEDICINE CLINIC | Facility: CLINIC | Age: 78
End: 2023-09-05
Payer: COMMERCIAL

## 2023-09-05 VITALS
TEMPERATURE: 98.3 F | DIASTOLIC BLOOD PRESSURE: 62 MMHG | HEART RATE: 103 BPM | SYSTOLIC BLOOD PRESSURE: 128 MMHG | BODY MASS INDEX: 25.44 KG/M2 | WEIGHT: 149 LBS | OXYGEN SATURATION: 95 % | HEIGHT: 64 IN

## 2023-09-05 DIAGNOSIS — H61.23 HEARING LOSS DUE TO CERUMEN IMPACTION, BILATERAL: Primary | ICD-10-CM

## 2023-09-05 PROCEDURE — 99213 OFFICE O/P EST LOW 20 MIN: CPT | Performed by: FAMILY MEDICINE

## 2023-09-05 PROCEDURE — 69210 REMOVE IMPACTED EAR WAX UNI: CPT | Performed by: FAMILY MEDICINE

## 2023-09-05 NOTE — PROGRESS NOTES
Name: Ledy Medley      : 1945      MRN: [id number]  Encounter Provider: Joshua Decker DO  Encounter Date: 2023   Encounter department: [address] [place] [address] [address]     1. Hearing loss due to cerumen impaction, bilateral  -     Ear cerumen removal           Subjective      HPI     Notes that she went for a hearing test due to decreased hearing, was told that there was too much wax and she needed to have this removed before doing the hearing test. Denies any pain, fever or chills. Review of Systems    Current Outpatient Medications on File Prior to Visit   Medication Sig   • miconazole (Tineacide) 2 % cream Apply topically 2 (two) times a day   • triamcinolone (KENALOG) 0.5 % cream Apply topically 2 (two) times a day   • Skin Protectants, Misc. (eucerin) cream Apply topically as needed for wound care       Objective     /62   Pulse 103   Temp 98.3 °F (36.8 °C)   Ht 5' 4" (1.626 m)   Wt 67.6 kg (149 lb)   SpO2 95%   BMI 25.58 kg/m²     Physical Exam     Ear cerumen removal    Date/Time: 2023 11:40 AM    Performed by: Joshua Decker DO  Authorized by: Joshua Decker DO  Universal Protocol:  Consent: Verbal consent obtained. Patient understanding: patient states understanding of the procedure being performed  Patient identity confirmed: verbally with patient      Patient location:  Clinic  Procedure details:     Local anesthetic:  None    Location:  L ear and R ear    Procedure type: irrigation with instrumentation      Instrumentation: loop      Approach:  Natural orifice    Visualization (free text):  TM visualized, normal.   Post-procedure details:     Complication:  None    Hearing quality:  Improved    Patient tolerance of procedure:   Tolerated well, no immediate complications        Joshua Decker DO

## 2023-09-22 PROBLEM — H61.20 IMPACTED CERUMEN: Status: RESOLVED | Noted: 2023-07-24 | Resolved: 2023-09-22

## 2024-06-10 DIAGNOSIS — E78.5 DYSLIPIDEMIA: Primary | ICD-10-CM

## 2024-08-07 ENCOUNTER — APPOINTMENT (OUTPATIENT)
Dept: LAB | Facility: HOSPITAL | Age: 79
End: 2024-08-07
Payer: COMMERCIAL

## 2024-08-07 DIAGNOSIS — E78.5 DYSLIPIDEMIA: ICD-10-CM

## 2024-08-07 LAB
ALBUMIN SERPL BCG-MCNC: 3.6 G/DL (ref 3.5–5)
ALP SERPL-CCNC: 86 U/L (ref 34–104)
ALT SERPL W P-5'-P-CCNC: 10 U/L (ref 7–52)
ANION GAP SERPL CALCULATED.3IONS-SCNC: 5 MMOL/L (ref 4–13)
AST SERPL W P-5'-P-CCNC: 17 U/L (ref 13–39)
BILIRUB SERPL-MCNC: 0.52 MG/DL (ref 0.2–1)
BUN SERPL-MCNC: 13 MG/DL (ref 5–25)
CALCIUM SERPL-MCNC: 8.5 MG/DL (ref 8.4–10.2)
CHLORIDE SERPL-SCNC: 105 MMOL/L (ref 96–108)
CHOLEST SERPL-MCNC: 171 MG/DL
CO2 SERPL-SCNC: 28 MMOL/L (ref 21–32)
CREAT SERPL-MCNC: 0.67 MG/DL (ref 0.6–1.3)
ERYTHROCYTE [DISTWIDTH] IN BLOOD BY AUTOMATED COUNT: 13.9 % (ref 11.6–15.1)
GFR SERPL CREATININE-BSD FRML MDRD: 85 ML/MIN/1.73SQ M
GLUCOSE SERPL-MCNC: 81 MG/DL (ref 65–140)
HCT VFR BLD AUTO: 37.7 % (ref 34.8–46.1)
HDLC SERPL-MCNC: 56 MG/DL
HGB BLD-MCNC: 12.2 G/DL (ref 11.5–15.4)
LDLC SERPL CALC-MCNC: 85 MG/DL (ref 0–100)
MCH RBC QN AUTO: 31.9 PG (ref 26.8–34.3)
MCHC RBC AUTO-ENTMCNC: 32.4 G/DL (ref 31.4–37.4)
MCV RBC AUTO: 99 FL (ref 82–98)
NONHDLC SERPL-MCNC: 115 MG/DL
PLATELET # BLD AUTO: 330 THOUSANDS/UL (ref 149–390)
PMV BLD AUTO: 10.9 FL (ref 8.9–12.7)
POTASSIUM SERPL-SCNC: 4.7 MMOL/L (ref 3.5–5.3)
PROT SERPL-MCNC: 6.8 G/DL (ref 6.4–8.4)
RBC # BLD AUTO: 3.82 MILLION/UL (ref 3.81–5.12)
SODIUM SERPL-SCNC: 138 MMOL/L (ref 135–147)
TRIGL SERPL-MCNC: 151 MG/DL
WBC # BLD AUTO: 11.08 THOUSAND/UL (ref 4.31–10.16)

## 2024-08-07 PROCEDURE — 80061 LIPID PANEL: CPT

## 2024-08-07 PROCEDURE — 85007 BL SMEAR W/DIFF WBC COUNT: CPT

## 2024-08-07 PROCEDURE — 85027 COMPLETE CBC AUTOMATED: CPT

## 2024-08-07 PROCEDURE — 36415 COLL VENOUS BLD VENIPUNCTURE: CPT

## 2024-08-07 PROCEDURE — 80053 COMPREHEN METABOLIC PANEL: CPT

## 2024-08-09 ENCOUNTER — TELEPHONE (OUTPATIENT)
Dept: FAMILY MEDICINE CLINIC | Facility: CLINIC | Age: 79
End: 2024-08-09

## 2024-08-09 DIAGNOSIS — D72.10 EOSINOPHILIA, UNSPECIFIED TYPE: Primary | ICD-10-CM

## 2024-08-09 DIAGNOSIS — D72.829 LEUKOCYTOSIS, UNSPECIFIED TYPE: ICD-10-CM

## 2024-08-09 LAB
BASOPHILS # BLD MANUAL: 0.11 THOUSAND/UL (ref 0–0.1)
BASOPHILS NFR MAR MANUAL: 1 % (ref 0–1)
EOSINOPHIL # BLD MANUAL: 4.21 THOUSAND/UL (ref 0–0.4)
EOSINOPHIL NFR BLD MANUAL: 38 % (ref 0–6)
LYMPHOCYTES # BLD AUTO: 2.77 THOUSAND/UL (ref 0.6–4.47)
LYMPHOCYTES # BLD AUTO: 25 % (ref 14–44)
MONOCYTES # BLD AUTO: 1 THOUSAND/UL (ref 0–1.22)
MONOCYTES NFR BLD: 9 % (ref 4–12)
NEUTROPHILS # BLD MANUAL: 2.99 THOUSAND/UL (ref 1.85–7.62)
NEUTS SEG NFR BLD AUTO: 27 % (ref 43–75)
PATHOLOGY REVIEW: YES
PLATELET BLD QL SMEAR: ADEQUATE
RBC MORPH BLD: NORMAL

## 2024-08-09 PROCEDURE — 85060 BLOOD SMEAR INTERPRETATION: CPT | Performed by: PATHOLOGY

## 2024-09-18 ENCOUNTER — APPOINTMENT (OUTPATIENT)
Dept: LAB | Facility: HOSPITAL | Age: 79
End: 2024-09-18
Payer: COMMERCIAL

## 2024-09-18 DIAGNOSIS — D72.10 EOSINOPHILIA, UNSPECIFIED TYPE: ICD-10-CM

## 2024-09-18 DIAGNOSIS — D72.829 LEUKOCYTOSIS, UNSPECIFIED TYPE: ICD-10-CM

## 2024-09-18 LAB
BASOPHILS # BLD MANUAL: 0 THOUSAND/UL (ref 0–0.1)
BASOPHILS NFR MAR MANUAL: 0 % (ref 0–1)
EOSINOPHIL # BLD MANUAL: 2.82 THOUSAND/UL (ref 0–0.4)
EOSINOPHIL NFR BLD MANUAL: 30 % (ref 0–6)
ERYTHROCYTE [DISTWIDTH] IN BLOOD BY AUTOMATED COUNT: 14.1 % (ref 11.6–15.1)
HCT VFR BLD AUTO: 37.4 % (ref 36.5–46.1)
HGB BLD-MCNC: 11.9 G/DL (ref 12–15.4)
LYMPHOCYTES # BLD AUTO: 2.26 THOUSAND/UL (ref 0.6–4.47)
LYMPHOCYTES # BLD AUTO: 24 % (ref 14–44)
MCH RBC QN AUTO: 31.3 PG (ref 26.8–34.3)
MCHC RBC AUTO-ENTMCNC: 31.8 G/DL (ref 31.4–37.4)
MCV RBC AUTO: 98 FL (ref 82–98)
MONOCYTES # BLD AUTO: 1.41 THOUSAND/UL (ref 0–1.22)
MONOCYTES NFR BLD: 15 % (ref 4–12)
NEUTROPHILS # BLD MANUAL: 2.92 THOUSAND/UL (ref 1.85–7.62)
NEUTS SEG NFR BLD AUTO: 31 % (ref 43–75)
PLATELET # BLD AUTO: 344 THOUSANDS/UL (ref 149–390)
PLATELET BLD QL SMEAR: ADEQUATE
PMV BLD AUTO: 10.6 FL (ref 8.9–12.7)
RBC # BLD AUTO: 3.8 MILLION/UL (ref 3.88–5.12)
RBC MORPH BLD: NORMAL
WBC # BLD AUTO: 9.41 THOUSAND/UL (ref 4.31–10.16)

## 2024-09-18 PROCEDURE — 85007 BL SMEAR W/DIFF WBC COUNT: CPT

## 2024-09-18 PROCEDURE — 36415 COLL VENOUS BLD VENIPUNCTURE: CPT

## 2024-09-18 PROCEDURE — 85027 COMPLETE CBC AUTOMATED: CPT

## 2024-10-22 ENCOUNTER — OFFICE VISIT (OUTPATIENT)
Dept: FAMILY MEDICINE CLINIC | Facility: CLINIC | Age: 79
End: 2024-10-22
Payer: COMMERCIAL

## 2024-10-22 VITALS
BODY MASS INDEX: 24.24 KG/M2 | TEMPERATURE: 98.7 F | WEIGHT: 142 LBS | HEIGHT: 64 IN | SYSTOLIC BLOOD PRESSURE: 126 MMHG | DIASTOLIC BLOOD PRESSURE: 64 MMHG | OXYGEN SATURATION: 97 % | HEART RATE: 91 BPM

## 2024-10-22 DIAGNOSIS — Z80.3 FAMILY HISTORY OF BREAST CANCER: ICD-10-CM

## 2024-10-22 DIAGNOSIS — Z00.00 MEDICARE ANNUAL WELLNESS VISIT, SUBSEQUENT: Primary | ICD-10-CM

## 2024-10-22 DIAGNOSIS — Z78.0 POST-MENOPAUSE: ICD-10-CM

## 2024-10-22 DIAGNOSIS — L40.9 PSORIASIS: ICD-10-CM

## 2024-10-22 DIAGNOSIS — M79.10 MYALGIA: ICD-10-CM

## 2024-10-22 PROCEDURE — 99214 OFFICE O/P EST MOD 30 MIN: CPT | Performed by: FAMILY MEDICINE

## 2024-10-22 PROCEDURE — G0439 PPPS, SUBSEQ VISIT: HCPCS | Performed by: FAMILY MEDICINE

## 2024-10-22 NOTE — ASSESSMENT & PLAN NOTE
We discussed polymyalgia rheumatica which may be the proper diagnosis.  Orders:    Sedimentation rate, automated; Future    Uric acid; Future    ARMEN Screen w/ Reflex to Titer/Pattern; Future    RF Screen w/ Reflex to Titer; Future    Lyme Total AB W Reflex to IGM/IGG; Future

## 2024-10-22 NOTE — PATIENT INSTRUCTIONS
Medicare Preventive Visit Patient Instructions  Thank you for completing your Welcome to Medicare Visit or Medicare Annual Wellness Visit today. Your next wellness visit will be due in one year (10/23/2025).  The screening/preventive services that you may require over the next 5-10 years are detailed below. Some tests may not apply to you based off risk factors and/or age. Screening tests ordered at today's visit but not completed yet may show as past due. Also, please note that scanned in results may not display below.  Preventive Screenings:  Service Recommendations Previous Testing/Comments   Colorectal Cancer Screening  * Colonoscopy    * Fecal Occult Blood Test (FOBT)/Fecal Immunochemical Test (FIT)  * Fecal DNA/Cologuard Test  * Flexible Sigmoidoscopy Age: 45-75 years old   Colonoscopy: every 10 years (may be performed more frequently if at higher risk)  OR  FOBT/FIT: every 1 year  OR  Cologuard: every 3 years  OR  Sigmoidoscopy: every 5 years  Screening may be recommended earlier than age 45 if at higher risk for colorectal cancer. Also, an individualized decision between you and your healthcare provider will decide whether screening between the ages of 76-85 would be appropriate. Colonoscopy: Not on file  FOBT/FIT: Not on file  Cologuard: Not on file  Sigmoidoscopy: Not on file          Breast Cancer Screening Age: 40+ years old  Frequency: every 1-2 years  Not required if history of left and right mastectomy Mammogram: Not on file        Cervical Cancer Screening Between the ages of 21-29, pap smear recommended once every 3 years.   Between the ages of 30-65, can perform pap smear with HPV co-testing every 5 years.   Recommendations may differ for women with a history of total hysterectomy, cervical cancer, or abnormal pap smears in past. Pap Smear: Not on file    Screening Not Indicated   Hepatitis C Screening Once for adults born between 1945 and 1965  More frequently in patients at high risk for  Hepatitis C Hep C Antibody: 07/17/2020    Screening Current   Diabetes Screening 1-2 times per year if you're at risk for diabetes or have pre-diabetes Fasting glucose: No results in last 5 years (No results in last 5 years)  A1C: 5.2 % (8/30/2021)  Screening Current   Cholesterol Screening Once every 5 years if you don't have a lipid disorder. May order more often based on risk factors. Lipid panel: 08/07/2024    Screening Current     Other Preventive Screenings Covered by Medicare:  Abdominal Aortic Aneurysm (AAA) Screening: covered once if your at risk. You're considered to be at risk if you have a family history of AAA.  Lung Cancer Screening: covers low dose CT scan once per year if you meet all of the following conditions: (1) Age 55-77; (2) No signs or symptoms of lung cancer; (3) Current smoker or have quit smoking within the last 15 years; (4) You have a tobacco smoking history of at least 20 pack years (packs per day multiplied by number of years you smoked); (5) You get a written order from a healthcare provider.  Glaucoma Screening: covered annually if you're considered high risk: (1) You have diabetes OR (2) Family history of glaucoma OR (3)  aged 50 and older OR (4)  American aged 65 and older  Osteoporosis Screening: covered every 2 years if you meet one of the following conditions: (1) You're estrogen deficient and at risk for osteoporosis based off medical history and other findings; (2) Have a vertebral abnormality; (3) On glucocorticoid therapy for more than 3 months; (4) Have primary hyperparathyroidism; (5) On osteoporosis medications and need to assess response to drug therapy.   Last bone density test (DXA Scan): Not on file.  HIV Screening: covered annually if you're between the age of 15-65. Also covered annually if you are younger than 15 and older than 65 with risk factors for HIV infection. For pregnant patients, it is covered up to 3 times per  pregnancy.    Immunizations:  Immunization Recommendations   Influenza Vaccine Annual influenza vaccination during flu season is recommended for all persons aged >= 6 months who do not have contraindications   Pneumococcal Vaccine   * Pneumococcal conjugate vaccine = PCV13 (Prevnar 13), PCV15 (Vaxneuvance), PCV20 (Prevnar 20)  * Pneumococcal polysaccharide vaccine = PPSV23 (Pneumovax) Adults 19-63 yo with certain risk factors or if 65+ yo  If never received any pneumonia vaccine: recommend Prevnar 20 (PCV20)  Give PCV20 if previously received 1 dose of PCV13 or PPSV23   Hepatitis B Vaccine 3 dose series if at intermediate or high risk (ex: diabetes, end stage renal disease, liver disease)   Respiratory syncytial virus (RSV) Vaccine - COVERED BY MEDICARE PART D  * RSVPreF3 (Arexvy) CDC recommends that adults 60 years of age and older may receive a single dose of RSV vaccine using shared clinical decision-making (SCDM)   Tetanus (Td) Vaccine - COST NOT COVERED BY MEDICARE PART B Following completion of primary series, a booster dose should be given every 10 years to maintain immunity against tetanus. Td may also be given as tetanus wound prophylaxis.   Tdap Vaccine - COST NOT COVERED BY MEDICARE PART B Recommended at least once for all adults. For pregnant patients, recommended with each pregnancy.   Shingles Vaccine (Shingrix) - COST NOT COVERED BY MEDICARE PART B  2 shot series recommended in those 19 years and older who have or will have weakened immune systems or those 50 years and older     Health Maintenance Due:      Topic Date Due   • Breast Cancer Screening: Mammogram  Never done   • Hepatitis C Screening  Completed     Immunizations Due:      Topic Date Due   • Pneumococcal Vaccine: 65+ Years (1 of 1 - PCV) Never done   • Influenza Vaccine (1) Never done   • COVID-19 Vaccine (3 - 2023-24 season) 09/01/2024     Advance Directives   What are advance directives?  Advance directives are legal documents that  state your wishes and plans for medical care. These plans are made ahead of time in case you lose your ability to make decisions for yourself. Advance directives can apply to any medical decision, such as the treatments you want, and if you want to donate organs.   What are the types of advance directives?  There are many types of advance directives, and each state has rules about how to use them. You may choose a combination of any of the following:  Living will:  This is a written record of the treatment you want. You can also choose which treatments you do not want, which to limit, and which to stop at a certain time. This includes surgery, medicine, IV fluid, and tube feedings.   Durable power of  for healthcare (DPAHC):  This is a written record that states who you want to make healthcare choices for you when you are unable to make them for yourself. This person, called a proxy, is usually a family member or a friend. You may choose more than 1 proxy.  Do not resuscitate (DNR) order:  A DNR order is used in case your heart stops beating or you stop breathing. It is a request not to have certain forms of treatment, such as CPR. A DNR order may be included in other types of advance directives.  Medical directive:  This covers the care that you want if you are in a coma, near death, or unable to make decisions for yourself. You can list the treatments you want for each condition. Treatment may include pain medicine, surgery, blood transfusions, dialysis, IV or tube feedings, and a ventilator (breathing machine).  Values history:  This document has questions about your views, beliefs, and how you feel and think about life. This information can help others choose the care that you would choose.  Why are advance directives important?  An advance directive helps you control your care. Although spoken wishes may be used, it is better to have your wishes written down. Spoken wishes can be misunderstood, or not  followed. Treatments may be given even if you do not want them. An advance directive may make it easier for your family to make difficult choices about your care.       © Copyright [employer] 2018 Information is for End User's use only and may not be sold, redistributed or otherwise used for commercial purposes. All illustrations and images included in CareNotes® are the copyrighted property of [employer] or [employer]

## 2024-10-22 NOTE — PROGRESS NOTES
Ambulatory Visit  Name: Debby Barrios      : 1945      MRN: [id number]  Encounter Provider: Eleazar Romo MD  Encounter Date: 10/22/2024   Encounter department: [facility] [address] [place] [address]    Assessment & Plan  Medicare annual wellness visit, subsequent         Psoriasis  Continue triamcinolone cream       Myalgia  We discussed polymyalgia rheumatica which may be the proper diagnosis.  Orders:    Sedimentation rate, automated; Future    Uric acid; Future    ARMEN Screen w/ Reflex to Titer/Pattern; Future    RF Screen w/ Reflex to Titer; Future    Lyme Total AB W Reflex to IGM/IGG; Future    Post-menopause    Orders:    DXA bone density spine hip and pelvis; Future    Family history of breast cancer  Patient refuses mammogram            Preventive health issues were discussed with patient, and age appropriate screening tests were ordered as noted in patient's After Visit Summary. Personalized health advice and appropriate referrals for health education or preventive services given if needed, as noted in patient's After Visit Summary.    History of Present Illness     Patient comes in for checkup.  She complains of pain in arms and legs.       Patient Care Team:  Eleazar Romo MD as PCP - General (Family Medicine)  Eleazar Romo MD as PCP - [facility]CP-[facility] ([facility])  Eleazar Romo MD as PCP - PCP-[facility] ([facility])    Review of Systems   Constitutional: Negative.    Respiratory: Negative.     Cardiovascular: Negative.    Musculoskeletal:  Positive for back pain and myalgias.     Medical History Reviewed by provider this encounter:  Tobacco  Allergies  Meds  Problems  Med Hx  Surg Hx  Fam Hx       Annual Wellness Visit Questionnaire   Debby is here for her Subsequent Wellness visit. Last Medicare Wellness visit information reviewed, patient interviewed and updates made to the record today.      Health Risk Assessment:   Patient rates overall health as fair. Patient feels that  their physical health rating is same. Patient is satisfied with their life. Eyesight was rated as same. Hearing was rated as same. Patient feels that their emotional and mental health rating is much better. Patients states they are never, rarely angry. Patient states they are never, rarely unusually tired/fatigued. Pain experienced in the last 7 days has been some. Patient's pain rating has been 6/10. Patient states that she has experienced no weight loss or gain in last 6 months.     Depression Screening:   PHQ-2 Score: 0      Fall Risk Screening:   In the past year, patient has experienced: no history of falling in past year      Urinary Incontinence Screening:   Patient has not leaked urine accidently in the last six months.     Home Safety:  Patient has trouble with stairs inside or outside of their home. Patient has working smoke alarms and has working carbon monoxide detector. Home safety hazards include: none.     Nutrition:   Current diet is Regular.     Medications:   Patient is not currently taking any over-the-counter supplements. Patient is able to manage medications.     Activities of Daily Living (ADLs)/Instrumental Activities of Daily Living (IADLs):   Walk and transfer into and out of bed and chair?: Yes  Dress and groom yourself?: Yes    Bathe or shower yourself?: Yes    Feed yourself? Yes  Do your laundry/housekeeping?: Yes  Manage your money, pay your bills and track your expenses?: Yes  Make your own meals?: Yes    Do your own shopping?: Yes    Previous Hospitalizations:   Any hospitalizations or ED visits within the last 12 months?: No      Advance Care Planning:   Living will: Yes    Durable POA for healthcare: Yes    Advanced directive: Yes    Advanced directive counseling given: Yes    Five wishes given: No    End of Life Decisions reviewed with patient: Yes    Provider agrees with end of life decisions: Yes      PREVENTIVE SCREENINGS      Cardiovascular Screening:    General: Screening  Current      Diabetes Screening:     General: Screening Current      Colorectal Cancer Screening:     General: Screening Current      Breast Cancer Screening:     General: Risks and Benefits Discussed    Due for: Mammogram        Cervical Cancer Screening:    General: Screening Not Indicated      Osteoporosis Screening:    General: Risks and Benefits Discussed    Due for: DXA Axial      Abdominal Aortic Aneurysm (AAA) Screening:        General: Screening Current      Lung Cancer Screening:     General: Screening Not Indicated      Hepatitis C Screening:    General: Screening Current    Screening, Brief Intervention, and Referral to Treatment (SBIRT)    Screening  Typical number of drinks in a day: 1  Typical number of drinks in a week: 4  Interpretation: Low risk drinking behavior.    Single Item Drug Screening:  How often have you used an illegal drug (including marijuana) or a prescription medication for non-medical reasons in the past year? never    Single Item Drug Screen Score: 0  Interpretation: Negative screen for possible drug use disorder    Social Determinants of Health     Financial Resource Strain: Low Risk  (7/24/2023)    Overall Financial Resource Strain (CARDIA)     Difficulty of Paying Living Expenses: Not hard at all   Food Insecurity: No Food Insecurity (10/22/2024)    Hunger Vital Sign     Worried About Running Out of Food in the Last Year: Never true     Ran Out of Food in the Last Year: Never true   Transportation Needs: No Transportation Needs (10/22/2024)    PRAPARE - Transportation     Lack of Transportation (Medical): No     Lack of Transportation (Non-Medical): No   Housing Stability: Unknown (10/22/2024)    Housing Stability Vital Sign     Unable to Pay for Housing in the Last Year: No     Homeless in the Last Year: No   Utilities: Not At Risk (10/22/2024)    [facility] Utilities     Threatened with loss of utilities: No     No results found.    Objective     /64   Pulse 91   Temp 98.7 °F  "(37.1 °C)   Ht 5' 4\" (1.626 m)   Wt 64.4 kg (142 lb)   SpO2 97%   BMI 24.37 kg/m²     Physical Exam  Constitutional:       Appearance: Normal appearance. She is well-developed.   HENT:      Head: Normocephalic and atraumatic.      Right Ear: Tympanic membrane, ear canal and external ear normal.      Left Ear: Tympanic membrane, ear canal and external ear normal.      Mouth/Throat:      Mouth: Mucous membranes are moist.      Pharynx: Oropharynx is clear.   Eyes:      Pupils: Pupils are equal, round, and reactive to light.   Neck:      Thyroid: No thyromegaly.   Cardiovascular:      Rate and Rhythm: Normal rate and regular rhythm.      Heart sounds: Normal heart sounds.   Pulmonary:      Effort: Pulmonary effort is normal.      Breath sounds: Normal breath sounds.   Abdominal:      Palpations: Abdomen is soft. There is no mass.      Tenderness: There is no abdominal tenderness.   Musculoskeletal:         General: Normal range of motion.      Cervical back: Neck supple.   Lymphadenopathy:      Cervical: No cervical adenopathy.   Skin:     General: Skin is warm and dry.   Neurological:      Mental Status: She is alert.   Psychiatric:         Mood and Affect: Mood normal.         Behavior: Behavior normal.         "

## 2024-10-30 ENCOUNTER — APPOINTMENT (OUTPATIENT)
Dept: LAB | Facility: HOSPITAL | Age: 79
End: 2024-10-30
Attending: FAMILY MEDICINE
Payer: COMMERCIAL

## 2024-10-30 DIAGNOSIS — M79.10 MYALGIA: ICD-10-CM

## 2024-10-30 LAB
ANA SER QL IA: NEGATIVE
B BURGDOR IGG SERPL QL IA: POSITIVE
B BURGDOR IGG+IGM SER QL IA: POSITIVE
B BURGDOR IGM SERPL QL IA: NEGATIVE
ERYTHROCYTE [SEDIMENTATION RATE] IN BLOOD: 21 MM/HOUR (ref 0–29)
URATE SERPL-MCNC: 4.1 MG/DL (ref 2–7.5)

## 2024-10-30 PROCEDURE — 86038 ANTINUCLEAR ANTIBODIES: CPT

## 2024-10-30 PROCEDURE — 86430 RHEUMATOID FACTOR TEST QUAL: CPT

## 2024-10-30 PROCEDURE — 86617 LYME DISEASE ANTIBODY: CPT

## 2024-10-30 PROCEDURE — 36415 COLL VENOUS BLD VENIPUNCTURE: CPT

## 2024-10-30 PROCEDURE — 84550 ASSAY OF BLOOD/URIC ACID: CPT

## 2024-10-30 PROCEDURE — 85652 RBC SED RATE AUTOMATED: CPT

## 2024-10-30 PROCEDURE — 86618 LYME DISEASE ANTIBODY: CPT

## 2024-10-31 LAB — RHEUMATOID FACT SER QL LA: NEGATIVE

## 2024-11-03 DIAGNOSIS — A69.20 LYME DISEASE: Primary | ICD-10-CM

## 2024-11-03 RX ORDER — DOXYCYCLINE HYCLATE 100 MG
100 TABLET ORAL 2 TIMES DAILY
Qty: 28 TABLET | Refills: 0 | Status: SHIPPED | OUTPATIENT
Start: 2024-11-03 | End: 2024-11-17

## 2024-11-18 ENCOUNTER — HOSPITAL ENCOUNTER (OUTPATIENT)
Age: 79
Discharge: HOME | End: 2024-11-18
Payer: COMMERCIAL

## 2024-11-18 VITALS — BODY MASS INDEX: 26.18 KG/M2 | HEIGHT: 62 IN

## 2024-11-18 DIAGNOSIS — Z78.0 POST-MENOPAUSE: ICD-10-CM

## 2024-11-18 PROCEDURE — 77080 DXA BONE DENSITY AXIAL: CPT

## 2024-11-19 ENCOUNTER — RESULTS FOLLOW-UP (OUTPATIENT)
Dept: FAMILY MEDICINE CLINIC | Facility: CLINIC | Age: 79
End: 2024-11-19

## 2024-11-20 ENCOUNTER — TELEPHONE (OUTPATIENT)
Age: 79
End: 2024-11-20

## 2024-11-20 DIAGNOSIS — M81.0 OSTEOPOROSIS, UNSPECIFIED OSTEOPOROSIS TYPE, UNSPECIFIED PATHOLOGICAL FRACTURE PRESENCE: Primary | ICD-10-CM

## 2024-11-20 RX ORDER — ALENDRONATE SODIUM 70 MG/1
70 TABLET ORAL
Qty: 12 TABLET | Refills: 3 | Status: SHIPPED | OUTPATIENT
Start: 2024-11-20

## 2024-12-03 ENCOUNTER — OFFICE VISIT (OUTPATIENT)
Dept: FAMILY MEDICINE CLINIC | Facility: CLINIC | Age: 79
End: 2024-12-03
Payer: COMMERCIAL

## 2024-12-03 VITALS
TEMPERATURE: 98.2 F | OXYGEN SATURATION: 98 % | DIASTOLIC BLOOD PRESSURE: 78 MMHG | BODY MASS INDEX: 25.76 KG/M2 | HEART RATE: 87 BPM | SYSTOLIC BLOOD PRESSURE: 124 MMHG | WEIGHT: 140 LBS | HEIGHT: 62 IN

## 2024-12-03 DIAGNOSIS — M54.50 BILATERAL LOW BACK PAIN WITHOUT SCIATICA, UNSPECIFIED CHRONICITY: ICD-10-CM

## 2024-12-03 DIAGNOSIS — G62.9 NEUROPATHY: ICD-10-CM

## 2024-12-03 DIAGNOSIS — M81.0 OSTEOPOROSIS, UNSPECIFIED OSTEOPOROSIS TYPE, UNSPECIFIED PATHOLOGICAL FRACTURE PRESENCE: Primary | ICD-10-CM

## 2024-12-03 PROBLEM — L40.9 PSORIASIS: Chronic | Status: ACTIVE | Noted: 2023-07-24

## 2024-12-03 PROCEDURE — 99214 OFFICE O/P EST MOD 30 MIN: CPT | Performed by: FAMILY MEDICINE

## 2024-12-03 PROCEDURE — G2211 COMPLEX E/M VISIT ADD ON: HCPCS | Performed by: FAMILY MEDICINE

## 2024-12-03 RX ORDER — IBUPROFEN 600 MG/1
600 TABLET, FILM COATED ORAL 3 TIMES DAILY PRN
Qty: 90 TABLET | Refills: 5 | Status: SHIPPED | OUTPATIENT
Start: 2024-12-03

## 2024-12-03 NOTE — PROGRESS NOTES
Assessment/Plan:         Problem List Items Addressed This Visit       Osteoporosis - Primary    Fosamax 70 mg per week         Bilateral low back pain without sciatica    Handout , exercises for low back pain         Relevant Medications    ibuprofen (MOTRIN) 600 mg tablet    Neuropathy    Vitamin B complex 1 daily              Subjective:      Patient ID: Debby Barrios is a 79 y.o. female.    Checkup and to review recent labs.  She complains of numbness in her feet and low back pain.  Fosamax since last visit for osteoporosis.  She is taking this without problems.        The following portions of the patient's history were reviewed and updated as appropriate:   Past Medical History:  She has no past medical history on file.,  _______________________________________________________________________  Medical Problems:  does not have any pertinent problems on file.,  _______________________________________________________________________  Past Surgical History:   has a past surgical history that includes Hysterectomy.,  _______________________________________________________________________  Family History:  family history includes Breast cancer in her sister; Cancer in her mother and sister; Emphysema in her father.,  _______________________________________________________________________  Social History:   reports that she quit smoking about 3 years ago. Her smoking use included cigarettes. She started smoking about 61 years ago. She has a 14.5 pack-year smoking history. She has never used smokeless tobacco. She reports current alcohol use. She reports that she does not use drugs.,  _______________________________________________________________________  Allergies:  is allergic to penicillins, cephalexin, promethazine, and sulfa antibiotics..  _______________________________________________________________________  Current Outpatient Medications   Medication Sig Dispense Refill    alendronate (Fosamax) 70 mg tablet  "Take 1 tablet (70 mg total) by mouth every 7 days 12 tablet 3    ibuprofen (MOTRIN) 600 mg tablet Take 1 tablet (600 mg total) by mouth 3 (three) times a day as needed (back pain) 90 tablet 5    miconazole (Tineacide) 2 % cream Apply topically 2 (two) times a day (Patient not taking: Reported on 12/3/2024)      Skin Protectants, Misc. (eucerin) cream Apply topically as needed for wound care (Patient not taking: Reported on 12/3/2024) 397 g 0    triamcinolone (KENALOG) 0.5 % cream Apply topically 2 (two) times a day (Patient not taking: Reported on 12/3/2024) 30 g 1     No current facility-administered medications for this visit.     _______________________________________________________________________  Review of Systems   Constitutional: Negative.    HENT: Negative.     Respiratory: Negative.     Cardiovascular: Negative.    Musculoskeletal:  Positive for back pain and gait problem.         Objective:  Vitals:    12/03/24 1116   BP: 124/78   BP Location: Left arm   Patient Position: Sitting   Cuff Size: Standard   Pulse: 87   Temp: 98.2 °F (36.8 °C)   TempSrc: Temporal   SpO2: 98%   Weight: 63.5 kg (140 lb)   Height: 5' 1.75\" (1.568 m)     Body mass index is 25.81 kg/m².     Physical Exam    "

## 2025-01-16 ENCOUNTER — OFFICE VISIT (OUTPATIENT)
Dept: FAMILY MEDICINE CLINIC | Facility: CLINIC | Age: 80
End: 2025-01-16
Payer: COMMERCIAL

## 2025-01-16 VITALS
HEIGHT: 62 IN | SYSTOLIC BLOOD PRESSURE: 136 MMHG | TEMPERATURE: 97.5 F | OXYGEN SATURATION: 96 % | HEART RATE: 86 BPM | WEIGHT: 141 LBS | BODY MASS INDEX: 25.95 KG/M2 | DIASTOLIC BLOOD PRESSURE: 76 MMHG

## 2025-01-16 DIAGNOSIS — L40.9 SCALP PSORIASIS: ICD-10-CM

## 2025-01-16 DIAGNOSIS — M81.0 OSTEOPOROSIS WITHOUT CURRENT PATHOLOGICAL FRACTURE, UNSPECIFIED OSTEOPOROSIS TYPE: ICD-10-CM

## 2025-01-16 DIAGNOSIS — J30.9 ALLERGIC CONJUNCTIVITIS OF BOTH EYES AND RHINITIS: ICD-10-CM

## 2025-01-16 DIAGNOSIS — H10.13 ALLERGIC CONJUNCTIVITIS OF BOTH EYES AND RHINITIS: ICD-10-CM

## 2025-01-16 DIAGNOSIS — H10.33 ACUTE BACTERIAL CONJUNCTIVITIS OF BOTH EYES: Primary | ICD-10-CM

## 2025-01-16 DIAGNOSIS — L40.9 PSORIASIS: ICD-10-CM

## 2025-01-16 PROCEDURE — 99214 OFFICE O/P EST MOD 30 MIN: CPT

## 2025-01-16 RX ORDER — KETOCONAZOLE 20 MG/ML
1 SHAMPOO, SUSPENSION TOPICAL 2 TIMES WEEKLY
Qty: 120 ML | Refills: 0 | Status: SHIPPED | OUTPATIENT
Start: 2025-01-16

## 2025-01-16 RX ORDER — OFLOXACIN 3 MG/ML
1 SOLUTION/ DROPS OPHTHALMIC 4 TIMES DAILY
Qty: 5 ML | Refills: 0 | Status: SHIPPED | OUTPATIENT
Start: 2025-01-16

## 2025-01-16 RX ORDER — CETIRIZINE HYDROCHLORIDE 5 MG/1
5 TABLET ORAL DAILY
Qty: 30 TABLET | Refills: 3 | Status: SHIPPED | OUTPATIENT
Start: 2025-01-16

## 2025-01-16 RX ORDER — TRIAMCINOLONE ACETONIDE 5 MG/G
CREAM TOPICAL 2 TIMES DAILY
Qty: 30 G | Refills: 1 | Status: SHIPPED | OUTPATIENT
Start: 2025-01-16

## 2025-01-16 NOTE — ASSESSMENT & PLAN NOTE
-Psoriasis plaques on b/l arms   -Was treated with Kenalog 0.5% cream previously  -Refilled   Orders:    triamcinolone (KENALOG) 0.5 % cream; Apply topically 2 (two) times a day

## 2025-01-16 NOTE — PROGRESS NOTES
Name: Debby Barrios      : 1945      MRN: [id number]  Encounter Provider: Jessica Cardona PA-C  Encounter Date: 2025   Encounter department: [facility] [address]  :  Assessment & Plan  Acute bacterial conjunctivitis of both eyes  -PT reports b/l eye redness, discharge, morning crusting x 4 weeks   -She reports symptoms have recently started worsening  -Denies fever, chills, n/v/d, congestion, cold/flu sx, wearing contacts  -She notes symptoms began after an eye dr appt in December  -She has been using otc stye cream without relief  -On exam, b/l conjuntival erythema, yellow discharge, crusted discharge on eyelids. Left eye with more discharge   -Ordered Ofloxacin 0.3% for conjunctivitis given the characteristic and duration of symptoms      Orders:    ofloxacin (OCUFLOX) 0.3 % ophthalmic solution; Administer 1 drop to both eyes 4 (four) times a day    Allergic conjunctivitis of both eyes and rhinitis  -PT notes eyes are itchy and this has been ongoing since the beginning of December  -Recommended daily Zyrtec to see if it helps with sx   Orders:    cetirizine (ZyrTEC) 5 MG tablet; Take 1 tablet (5 mg total) by mouth daily    Osteoporosis without current pathological fracture, unspecified osteoporosis type  -Cont Fosamax        Psoriasis  -Psoriasis plaques on b/l arms   -Was treated with Kenalog 0.5% cream previously  -Refilled   Orders:    triamcinolone (KENALOG) 0.5 % cream; Apply topically 2 (two) times a day    Scalp psoriasis  -PT notes itchy scalp with white scales that flake off, they are located around the hair line. She notes this has been ongoing for about a month  -She has tried dandruff shampoo without relief  -notes it is intensely itchy and makes her hair feel greasy   -Ordered ketoconazole 2% shampoo     Orders:    ketoconazole (NIZORAL) 2 % shampoo; Apply 1 Application topically 2 (two) times a week           History of Present Illness  "    ARTURO Guardado presents to the office for evaluation of eye redness x 4 weeks. She notes the eyes are itching and painful. She notes left eye is much worse. She notes continuous discharge and eyes are crusted over in morning.    She notes she has been using topical stye cream.     Denies fever, chills, congestion.     Denies contact use. She notes b/l eye redness began after eye dr appt.     She notes b/l arm eczema x 1 month. She notes it is itching     Review of Systems   Constitutional:  Negative for activity change, appetite change, fatigue and fever.   HENT:  Negative for congestion, ear pain, rhinorrhea and sore throat.    Eyes:  Positive for discharge, redness and itching. Negative for photophobia, pain and visual disturbance.   Respiratory:  Negative for cough, shortness of breath and wheezing.    Cardiovascular:  Negative for chest pain and leg swelling.   Gastrointestinal:  Negative for abdominal distention, abdominal pain, constipation, diarrhea, nausea and vomiting.   Genitourinary:  Negative for dysuria, frequency and urgency.   Musculoskeletal:  Negative for gait problem.   Skin:  Positive for rash.   Neurological:  Negative for dizziness, light-headedness and headaches.       Objective   /76 (BP Location: Left arm, Patient Position: Sitting, Cuff Size: Standard)   Pulse 86   Temp 97.5 °F (36.4 °C) (Temporal)   Ht 5' 1.75\" (1.568 m)   Wt 64 kg (141 lb)   SpO2 96%   BMI 26.00 kg/m²      Physical Exam  Vitals reviewed.   Constitutional:       General: She is not in acute distress.     Appearance: Normal appearance.   HENT:      Head: Normocephalic and atraumatic.      Right Ear: Tympanic membrane, ear canal and external ear normal.      Left Ear: Tympanic membrane, ear canal and external ear normal.      Nose: Nose normal.      Mouth/Throat:      Mouth: Mucous membranes are moist.   Eyes:      Extraocular Movements: Extraocular movements intact.      Conjunctiva/sclera: Conjunctivae normal. "   Cardiovascular:      Rate and Rhythm: Normal rate and regular rhythm.      Heart sounds: Normal heart sounds. No murmur heard.  Pulmonary:      Effort: Pulmonary effort is normal.      Breath sounds: Wheezing present. No rhonchi or rales.   Abdominal:      General: Bowel sounds are normal. There is no distension.      Palpations: Abdomen is soft.      Tenderness: There is no abdominal tenderness.   Musculoskeletal:      Cervical back: Neck supple.      Right lower leg: No edema.      Left lower leg: No edema.   Lymphadenopathy:      Cervical: No cervical adenopathy.   Skin:     General: Skin is warm.      Capillary Refill: Capillary refill takes less than 2 seconds.      Findings: Rash present. Rash is scaling.      Comments: Raised plaques on b/l forearms with white/silvery scales     White scales noted near hairline that flake off    Neurological:      Mental Status: She is alert. Mental status is at baseline.           Jessica Cardona PA-C  [facility]  1/16/2025 12:11 PM

## 2025-02-07 DIAGNOSIS — J30.9 ALLERGIC CONJUNCTIVITIS OF BOTH EYES AND RHINITIS: ICD-10-CM

## 2025-02-07 DIAGNOSIS — H10.13 ALLERGIC CONJUNCTIVITIS OF BOTH EYES AND RHINITIS: ICD-10-CM

## 2025-02-07 RX ORDER — CETIRIZINE HYDROCHLORIDE 5 MG/1
5 TABLET ORAL DAILY
Qty: 90 TABLET | Refills: 1 | Status: SHIPPED | OUTPATIENT
Start: 2025-02-07

## 2025-02-12 DIAGNOSIS — L40.9 SCALP PSORIASIS: ICD-10-CM

## 2025-02-14 RX ORDER — KETOCONAZOLE 20 MG/ML
1 SHAMPOO, SUSPENSION TOPICAL 2 TIMES WEEKLY
Qty: 120 ML | Refills: 0 | Status: SHIPPED | OUTPATIENT
Start: 2025-02-17

## 2025-02-26 ENCOUNTER — OFFICE VISIT (OUTPATIENT)
Dept: FAMILY MEDICINE CLINIC | Facility: CLINIC | Age: 80
End: 2025-02-26
Payer: COMMERCIAL

## 2025-02-26 VITALS
TEMPERATURE: 99.1 F | OXYGEN SATURATION: 99 % | HEIGHT: 62 IN | SYSTOLIC BLOOD PRESSURE: 134 MMHG | HEART RATE: 100 BPM | DIASTOLIC BLOOD PRESSURE: 84 MMHG | WEIGHT: 132 LBS | BODY MASS INDEX: 24.29 KG/M2

## 2025-02-26 DIAGNOSIS — J06.9 URI WITH COUGH AND CONGESTION: Primary | ICD-10-CM

## 2025-02-26 PROBLEM — T83.518A: Status: ACTIVE | Noted: 2025-02-26

## 2025-02-26 PROCEDURE — 99213 OFFICE O/P EST LOW 20 MIN: CPT | Performed by: PHYSICIAN ASSISTANT

## 2025-02-26 PROCEDURE — G2211 COMPLEX E/M VISIT ADD ON: HCPCS | Performed by: PHYSICIAN ASSISTANT

## 2025-02-26 PROCEDURE — 87636 SARSCOV2 & INF A&B AMP PRB: CPT | Performed by: PHYSICIAN ASSISTANT

## 2025-02-26 RX ORDER — ZINC GLUCONATE 50 MG
50 TABLET ORAL DAILY
Start: 2025-02-26

## 2025-02-26 RX ORDER — AZELASTINE 1 MG/ML
1 SPRAY, METERED NASAL 2 TIMES DAILY
Qty: 30 ML | Refills: 1 | Status: SHIPPED | OUTPATIENT
Start: 2025-02-26

## 2025-02-26 RX ORDER — ASCORBATE CALCIUM 500 MG
500 TABLET ORAL DAILY
Start: 2025-02-26

## 2025-02-26 RX ORDER — FAMOTIDINE 20 MG
1 TABLET ORAL
Start: 2025-02-26

## 2025-02-26 NOTE — ASSESSMENT & PLAN NOTE
Symptomatic treatment  Orders:  •  Covid/Flu- Office Collect Normal  •  azelastine (ASTELIN) 0.1 % nasal spray; 1 spray into each nostril 2 (two) times a day Use in each nostril as directed  •  Calcium Ascorbate (VITAMIN C) 500 mg tablet; Take 1 tablet (500 mg total) by mouth daily  •  zinc gluconate 50 mg tablet; Take 1 tablet (50 mg total) by mouth daily  •  Vitamin D, Cholecalciferol, 25 MCG (1000 UT) CAPS; Take 1 tablet (1,000 Units total) by mouth daily after breakfast

## 2025-02-26 NOTE — PROGRESS NOTES
Name: Debby Barrios      : 1945      MRN: [id number]  Encounter Provider: Evita Christie PA-C  Encounter Date: 2025   Encounter department: [facility] [address]  :  Assessment & Plan  URI with cough and congestion  Symptomatic treatment  Orders:  •  Covid/Flu- Office Collect Normal  •  azelastine (ASTELIN) 0.1 % nasal spray; 1 spray into each nostril 2 (two) times a day Use in each nostril as directed  •  Calcium Ascorbate (VITAMIN C) 500 mg tablet; Take 1 tablet (500 mg total) by mouth daily  •  zinc gluconate 50 mg tablet; Take 1 tablet (50 mg total) by mouth daily  •  Vitamin D, Cholecalciferol, 25 MCG (1000 UT) CAPS; Take 1 tablet (1,000 Units total) by mouth daily after breakfast           History of Present Illness   URI   This is a new problem. The current episode started in the past 7 days. The problem has been gradually worsening. The maximum temperature recorded prior to her arrival was 100.4 - 100.9 F. The fever has been present for 1 to 2 days. Associated symptoms include congestion, coughing, diarrhea, headaches, nausea, a plugged ear sensation, sinus pain and a sore throat. Pertinent negatives include no abdominal pain, chest pain, dysuria, ear pain, joint pain, joint swelling, neck pain, rash, rhinorrhea, sneezing, swollen glands, vomiting or wheezing. She has tried nothing for the symptoms.     Review of Systems   Constitutional:  Positive for appetite change, chills, fatigue and fever.   HENT:  Positive for congestion, sinus pressure, sinus pain, sore throat and voice change. Negative for ear pain, rhinorrhea and sneezing.    Respiratory:  Positive for cough and shortness of breath. Negative for wheezing.    Cardiovascular:  Negative for chest pain.   Gastrointestinal:  Positive for diarrhea and nausea. Negative for abdominal pain and vomiting.   Genitourinary:  Negative for dysuria.   Musculoskeletal:  Positive for myalgias. Negative  "for joint pain and neck pain.   Skin:  Negative for rash.   Neurological:  Positive for dizziness and headaches.       Objective   /84 (Patient Position: Sitting, Cuff Size: Adult)   Pulse 100   Temp 99.1 °F (37.3 °C)   Ht 5' 1.7\" (1.567 m)   Wt 59.9 kg (132 lb)   SpO2 99%   BMI 24.38 kg/m²      Physical Exam  Vitals and nursing note reviewed.   Constitutional:       Appearance: Normal appearance. She is normal weight.   HENT:      Head: Normocephalic and atraumatic.      Right Ear: Tympanic membrane, ear canal and external ear normal.      Left Ear: Tympanic membrane, ear canal and external ear normal.      Nose: Nose normal.      Mouth/Throat:      Mouth: Mucous membranes are moist.      Pharynx: Oropharynx is clear.   Eyes:      Extraocular Movements: Extraocular movements intact.      Conjunctiva/sclera: Conjunctivae normal.   Neck:      Thyroid: No thyromegaly.      Vascular: No carotid bruit.   Cardiovascular:      Rate and Rhythm: Normal rate and regular rhythm.      Pulses: Normal pulses.      Heart sounds: Normal heart sounds.   Pulmonary:      Effort: Pulmonary effort is normal.      Breath sounds: Normal breath sounds.   Abdominal:      General: Abdomen is flat. Bowel sounds are normal.      Palpations: Abdomen is soft. There is no hepatomegaly, splenomegaly or mass.      Tenderness: There is abdominal tenderness. There is no right CVA tenderness or left CVA tenderness.   Musculoskeletal:         General: Normal range of motion.      Cervical back: Normal range of motion and neck supple.      Right lower leg: No edema.      Left lower leg: No edema.   Lymphadenopathy:      Cervical: No cervical adenopathy.   Skin:     General: Skin is warm and dry.   Neurological:      General: No focal deficit present.      Mental Status: She is alert and oriented to person, place, and time.   Psychiatric:         Mood and Affect: Mood normal.         Behavior: Behavior normal.         Thought Content: " Thought content normal.         Judgment: Judgment normal.

## 2025-02-27 ENCOUNTER — RESULTS FOLLOW-UP (OUTPATIENT)
Dept: FAMILY MEDICINE CLINIC | Facility: CLINIC | Age: 80
End: 2025-02-27

## 2025-02-27 DIAGNOSIS — J10.1 INFLUENZA A: Primary | ICD-10-CM

## 2025-02-27 LAB
FLUAV RNA RESP QL NAA+PROBE: POSITIVE
FLUBV RNA RESP QL NAA+PROBE: NEGATIVE
SARS-COV-2 RNA RESP QL NAA+PROBE: NEGATIVE

## 2025-02-27 RX ORDER — OSELTAMIVIR PHOSPHATE 75 MG/1
75 CAPSULE ORAL 2 TIMES DAILY
Qty: 10 CAPSULE | Refills: 0 | Status: SHIPPED | OUTPATIENT
Start: 2025-02-27 | End: 2025-03-04

## 2025-03-06 ENCOUNTER — OFFICE VISIT (OUTPATIENT)
Dept: FAMILY MEDICINE CLINIC | Facility: CLINIC | Age: 80
End: 2025-03-06

## 2025-03-06 ENCOUNTER — HOSPITAL ENCOUNTER (OUTPATIENT)
Dept: RADIOLOGY | Facility: HOSPITAL | Age: 80
End: 2025-03-06
Payer: COMMERCIAL

## 2025-03-06 VITALS
HEART RATE: 92 BPM | WEIGHT: 130 LBS | TEMPERATURE: 98 F | DIASTOLIC BLOOD PRESSURE: 74 MMHG | SYSTOLIC BLOOD PRESSURE: 130 MMHG | OXYGEN SATURATION: 95 % | HEIGHT: 62 IN | BODY MASS INDEX: 23.92 KG/M2

## 2025-03-06 DIAGNOSIS — J18.9 PNEUMONIA DUE TO INFECTIOUS ORGANISM, UNSPECIFIED LATERALITY, UNSPECIFIED PART OF LUNG: ICD-10-CM

## 2025-03-06 DIAGNOSIS — J18.9 PNEUMONIA DUE TO INFECTIOUS ORGANISM, UNSPECIFIED LATERALITY, UNSPECIFIED PART OF LUNG: Primary | ICD-10-CM

## 2025-03-06 PROCEDURE — 71046 X-RAY EXAM CHEST 2 VIEWS: CPT

## 2025-03-06 RX ORDER — BENZONATATE 200 MG/1
200 CAPSULE ORAL 3 TIMES DAILY PRN
Qty: 20 CAPSULE | Refills: 0 | Status: SHIPPED | OUTPATIENT
Start: 2025-03-06

## 2025-03-06 RX ORDER — IPRATROPIUM BROMIDE AND ALBUTEROL SULFATE 2.5; .5 MG/3ML; MG/3ML
3 SOLUTION RESPIRATORY (INHALATION) ONCE
Status: COMPLETED | OUTPATIENT
Start: 2025-03-06 | End: 2025-03-06

## 2025-03-06 RX ORDER — ALBUTEROL SULFATE 90 UG/1
2 INHALANT RESPIRATORY (INHALATION) EVERY 6 HOURS PRN
Qty: 8 G | Refills: 0 | Status: SHIPPED | OUTPATIENT
Start: 2025-03-06

## 2025-03-06 RX ORDER — DOXYCYCLINE HYCLATE 100 MG
100 TABLET ORAL 2 TIMES DAILY
Qty: 14 TABLET | Refills: 0 | Status: SHIPPED | OUTPATIENT
Start: 2025-03-06 | End: 2025-03-13

## 2025-03-06 RX ADMIN — IPRATROPIUM BROMIDE AND ALBUTEROL SULFATE 3 ML: 2.5; .5 SOLUTION RESPIRATORY (INHALATION) at 12:52

## 2025-03-06 NOTE — PROGRESS NOTES
Name: Debby Barrios      : 1945      MRN: [id number]  Encounter Provider: RANDALL Richardson  Encounter Date: 3/6/2025   Encounter department: [facility] [address]  :  Assessment & Plan  Pneumonia due to infectious organism, unspecified laterality, unspecified part of lung  Presents of improved airflow after DuoNeb nebulizer treatment while in office.  To obtain chest x-ray now.  To begin doxycycline 1 tablet every 12 hours for 7 days, albuterol 2 puffs every 6 hours as needed for shortness of breath or wheezing and benzonatate every 8 hours as needed for cough.  Counseled on the importance of resting, increasing fluid intake, beginning sinus rinses, and using a cool-mist humidifier at nighttime.  To take Tylenol or Motrin as needed for fever or discomfort.  To follow-up in 1 week or sooner if symptoms worsen.   To ER for any worsening shortness of breath or high fever.  Orders:    benzonatate (TESSALON) 200 MG capsule; Take 1 capsule (200 mg total) by mouth 3 (three) times a day as needed for cough    XR chest pa and lateral; Future    albuterol (PROVENTIL HFA,VENTOLIN HFA) 90 mcg/act inhaler; Inhale 2 puffs every 6 (six) hours as needed for wheezing or shortness of breath    doxycycline hyclate (VIBRA-TABS) 100 mg tablet; Take 1 tablet (100 mg total) by mouth 2 (two) times a day for 7 days    ipratropium-albuterol (DUO-NEB) 0.5-2.5 mg/3 mL inhalation solution 3 mL    Mini neb           History of Present Illness   Debby presents reporting a productive cough for over a week.  She is also having ear pain, shortness of breath, chills, headache, and slight confusion she noted today.  Last week, she was treated with Tamiflu for influenza A.    Shortness of Breath  Associated symptoms include coughing.     Review of Systems   Constitutional:  Positive for chills.   HENT:  Positive for ear pain.    Respiratory:  Positive for cough and shortness of breath.   "  Cardiovascular: Negative.    Gastrointestinal: Negative.    Endocrine: Negative.    Genitourinary: Negative.    Musculoskeletal: Negative.    Neurological:  Positive for headaches.   Psychiatric/Behavioral:  Positive for confusion.        Objective   /74   Pulse 92   Temp 98 °F (36.7 °C)   Ht 5' 1.7\" (1.567 m)   Wt 59 kg (130 lb)   SpO2 95%   BMI 24.01 kg/m²      Physical Exam  Vitals and nursing note reviewed.   Constitutional:       General: She is not in acute distress.     Appearance: Normal appearance. She is well-developed. She is not ill-appearing, toxic-appearing or diaphoretic.   HENT:      Head: Normocephalic and atraumatic.      Right Ear: There is impacted cerumen.      Left Ear: Tympanic membrane normal.      Nose: Nose normal.      Mouth/Throat:      Mouth: Mucous membranes are moist.      Pharynx: Oropharynx is clear.   Eyes:      Conjunctiva/sclera: Conjunctivae normal.   Cardiovascular:      Rate and Rhythm: Normal rate and regular rhythm.      Heart sounds: Normal heart sounds. No murmur heard.  Pulmonary:      Effort: Pulmonary effort is normal. No respiratory distress.      Breath sounds: Examination of the left-upper field reveals decreased breath sounds. Examination of the left-middle field reveals decreased breath sounds. Examination of the right-lower field reveals rales. Decreased breath sounds, wheezing and rales present.      Comments: Harsh, wet cough through exam   Chest:      Chest wall: No tenderness.   Musculoskeletal:      Cervical back: Neck supple.      Comments: Presence of kyphosis on exam and use of back brace   Lymphadenopathy:      Cervical: No cervical adenopathy.   Skin:     General: Skin is warm and dry.      Capillary Refill: Capillary refill takes less than 2 seconds.   Neurological:      General: No focal deficit present.      Mental Status: She is alert and oriented to person, place, and time.   Psychiatric:         Mood and Affect: Mood normal.         " "Behavior: Behavior normal.         Thought Content: Thought content normal.         Judgment: Judgment normal.     Mini neb    Performed by: RANDALL Richardson  Authorized by: RANDALL Richardson  Universal Protocol:  procedure performed by consultantConsent: Verbal consent obtained.  Risks and benefits: risks, benefits and alternatives were discussed  Consent given by: patient  Time out: Immediately prior to procedure a \"time out\" was called to verify the correct patient, procedure, equipment, support staff and site/side marked as required.  Patient understanding: patient states understanding of the procedure being performed  Patient identity confirmed: verbally with patient    Number of treatments:  1  Treatment 1:   Pre-Procedure     Symptoms:  Wheezing, cough and shortness of breath    Lung Sounds:  Rales, wheezing    HR:  79    SP02:  98    Medication Administered:  Duoneb - Albuterol 2.5 mg/Atrovent 0.5 mg  Post-Procedure     Lung sounds:  Rales in right base, wheezing in left lung    HR:  92    SP02:  95  Nebulizer Comments:  Improved airflow        "

## 2025-03-10 ENCOUNTER — RESULTS FOLLOW-UP (OUTPATIENT)
Dept: FAMILY MEDICINE CLINIC | Facility: CLINIC | Age: 80
End: 2025-03-10

## 2025-03-10 DIAGNOSIS — J18.9 PNEUMONIA DUE TO INFECTIOUS ORGANISM, UNSPECIFIED LATERALITY, UNSPECIFIED PART OF LUNG: Primary | ICD-10-CM

## 2025-03-11 NOTE — TELEPHONE ENCOUNTER
Patient returned call. Advised of Ann Malcolm's message regarding X-ray results.     Patient stated she is feeling better and will schedule the CT Scan

## 2025-03-27 ENCOUNTER — HOSPITAL ENCOUNTER (OUTPATIENT)
Dept: CT IMAGING | Facility: HOSPITAL | Age: 80
End: 2025-03-27
Payer: COMMERCIAL

## 2025-03-27 DIAGNOSIS — J18.9 PNEUMONIA DUE TO INFECTIOUS ORGANISM, UNSPECIFIED LATERALITY, UNSPECIFIED PART OF LUNG: ICD-10-CM

## 2025-03-27 PROCEDURE — 71250 CT THORAX DX C-: CPT

## 2025-03-28 PROBLEM — T83.518A: Status: RESOLVED | Noted: 2025-02-26 | Resolved: 2025-03-28

## 2025-03-28 PROBLEM — J06.9 URI WITH COUGH AND CONGESTION: Status: RESOLVED | Noted: 2025-02-26 | Resolved: 2025-03-28

## 2025-04-01 ENCOUNTER — DOCUMENTATION (OUTPATIENT)
Dept: FAMILY MEDICINE CLINIC | Facility: CLINIC | Age: 80
End: 2025-04-01

## 2025-04-01 ENCOUNTER — RESULTS FOLLOW-UP (OUTPATIENT)
Dept: FAMILY MEDICINE CLINIC | Facility: CLINIC | Age: 80
End: 2025-04-01

## 2025-04-01 DIAGNOSIS — R91.8 LUNG MASS: Primary | ICD-10-CM

## 2025-04-02 ENCOUNTER — DOCUMENTATION (OUTPATIENT)
Dept: HEMATOLOGY ONCOLOGY | Facility: CLINIC | Age: 80
End: 2025-04-02

## 2025-04-02 NOTE — PROGRESS NOTES
All records needed are in patients chart. No records retrieval needed at this time.    Pt referred to Thoracic Surgery and should be scheduled within 7 days.  Please notify me if not scheduled within time frame.    Referral received/ Chart reviewed for work up completed     [facility]    [facility]   [] MRI   [x] CT chest wo contrast 03/27/25   [] US   [] Mammo   [] Bone scan   [x] XR chest pa and lateral 03/06/25    Pathology completed:    Date:   Location:   [x]N/A    Additional records needed:    [] Genomic report   [] Genetic testing results   [] Office Note   [] Procedure/ Operative note   [] Lab results   [x] N/A      [] Radiation Oncology records retrieval needed (PN to route to rad/onc clerical pool once scheduled)  Date:  Location:

## 2025-04-03 ENCOUNTER — TELEPHONE (OUTPATIENT)
Age: 80
End: 2025-04-03

## 2025-04-03 NOTE — TELEPHONE ENCOUNTER
Patient received a referral for Thoracic surgery, as per review the patient needs to be seen within 7 days.    Patient prefers the [place] location only, she is scheduled for 05/02/2025 @1:30PM with Dr. You    Please contact the patient if a sooner appointment is available.

## 2025-04-11 ENCOUNTER — CONSULT (OUTPATIENT)
Dept: CARDIAC SURGERY | Facility: CLINIC | Age: 80
End: 2025-04-11
Attending: NURSE PRACTITIONER
Payer: COMMERCIAL

## 2025-04-11 VITALS
HEIGHT: 62 IN | WEIGHT: 134.8 LBS | OXYGEN SATURATION: 90 % | DIASTOLIC BLOOD PRESSURE: 74 MMHG | SYSTOLIC BLOOD PRESSURE: 120 MMHG | TEMPERATURE: 98 F | BODY MASS INDEX: 24.8 KG/M2 | HEART RATE: 91 BPM

## 2025-04-11 DIAGNOSIS — E04.1 THYROID NODULE: Primary | ICD-10-CM

## 2025-04-11 DIAGNOSIS — R91.8 LUNG MASS: ICD-10-CM

## 2025-04-11 PROCEDURE — 99205 OFFICE O/P NEW HI 60 MIN: CPT | Performed by: THORACIC SURGERY (CARDIOTHORACIC VASCULAR SURGERY)

## 2025-04-11 RX ORDER — LANOLIN ALCOHOL/MO/W.PET/CERES
CREAM (GRAM) TOPICAL DAILY
COMMUNITY
End: 2025-04-15

## 2025-04-11 NOTE — PROGRESS NOTES
Name: Debby Barrios      : 1945      MRN: [id number]  Encounter Provider: Jacki You MD  Encounter Date: 2025   Encounter department: [facility]  :  Assessment & Plan  Lung mass  Today in the office, weight 1 conversation regarding this mass.  This is an approximately 4 cm mass located in the right lower lobe.  This is concerning for malignancy.  At this time, this needs to be worked up in its entirety.  A PET CT scan was already ordered and this is scheduled for later this month.  I am ordering pulmonary function testing for today so that we can get a baseline lung function.  I did explain to her that she will need to undergo a biopsy but I would like to wait for the PET CT scan in order to determine whether a navigation bronchoscopy or a percutaneous biopsy will be her best option.  She prefers to have a percutaneous biopsy so I will order that today and we will schedule that for after the PET CT scan    Orders:    Complete PFT with post bronchodilator; Future    Ambulatory Referral to Thoracic Surgery    Ambulatory referral to Interventional Radiology; Future    Thyroid nodule  She has an incidentally identified 2.9 cm right thyroid nodule.  I have ordered a thyroid ultrasound to review this  Orders:    US thyroid; Future        Thoracic History     No problems updated.     History of Present Illness   HPI  Debby Barrios is a 79 y.o. female who presents my office with an almost 4 cm right lower lobe mass.  This is in the setting of having had a influenza approximately 2 months ago.  She was treated for this but a chest x-ray was performed which identified a mass and she was subsequently sent for the CT scan.  I have personally reviewed her imaging in PACS.  This demonstrates multiple pulmonary nodules but the most concerning is the 3.8 cm right lower lobe mass.  This is highly suspicious for malignancy.  She has no enlarged mediastinal or hilar  lymphadenopathy on the CT scan.    Today in the office, she reports that she is in an okay state of health.  She does endorse shortness of breath and dyspnea with little exertion.  She denies any chest pain.  She denies a chronic cough.  She did have influenza approximately 2 months ago and has recovered from that.  She denies any unexplained weight loss.  She does complain of lumbar back pain.    She was a previous smoker.  She quit 4 to 5 years ago.    She denies a family history of lung cancer    She denies any personal history of cancer    She has never had surgery on her chest or lungs before    I have personally read all the most relevant notes in the chart including from RANDALL Richardson    Review of Systems   Constitutional:  Negative for chills, fatigue, fever and unexpected weight change.   HENT: Negative.     Eyes: Negative.  Negative for visual disturbance.   Respiratory:  Positive for shortness of breath. Negative for cough and stridor.    Cardiovascular:  Negative for chest pain.   Gastrointestinal: Negative.    Endocrine: Negative.    Genitourinary: Negative.    Musculoskeletal: Negative.    Skin: Negative.    Neurological:  Negative for dizziness, light-headedness and headaches.   Hematological:  Negative for adenopathy.   Psychiatric/Behavioral: Negative.        Medical History Reviewed by provider this encounter:     .  Past Medical History   History reviewed. No pertinent past medical history.  Past Surgical History:   Procedure Laterality Date    HYSTERECTOMY       Family History   Problem Relation Age of Onset    Cancer Mother         unknown    Emphysema Father     Breast cancer Sister     Cancer Sister       reports that she quit smoking about 4 years ago. Her smoking use included cigarettes. She started smoking about 62 years ago. She has a 14.5 pack-year smoking history. She has never used smokeless tobacco. She reports current alcohol use. She reports that she does not use drugs.  Current  Outpatient Medications   Medication Instructions    albuterol (PROVENTIL HFA,VENTOLIN HFA) 90 mcg/act inhaler 2 puffs, Inhalation, Every 6 hours PRN    alendronate (FOSAMAX) 70 mg, Oral, Every 7 days    azelastine (ASTELIN) 0.1 % nasal spray 1 spray, Nasal, 2 times daily, Use in each nostril as directed    benzonatate (TESSALON) 200 mg, Oral, 3 times daily PRN    Calcium Ascorbate (VITAMIN C) 500 mg, Oral, Daily    vitamin B-12 (VITAMIN B-12) 1,000 mcg tablet Daily    Vitamin D (Cholecalciferol) 1,000 Units, Oral, Daily after breakfast    zinc gluconate 50 mg, Oral, Daily     Allergies   Allergen Reactions    Penicillins Other (See Comments), Swelling and Rash     Other reaction(s): swelling      Cephalexin Hives    Promethazine Delirium    Sulfa Antibiotics Hives      Current Outpatient Medications on File Prior to Visit   Medication Sig Dispense Refill    vitamin B-12 (VITAMIN B-12) 1,000 mcg tablet Take by mouth daily      albuterol (PROVENTIL HFA,VENTOLIN HFA) 90 mcg/act inhaler Inhale 2 puffs every 6 (six) hours as needed for wheezing or shortness of breath (Patient not taking: Reported on 4/11/2025) 8 g 0    alendronate (Fosamax) 70 mg tablet Take 1 tablet (70 mg total) by mouth every 7 days (Patient not taking: Reported on 4/11/2025) 12 tablet 3    azelastine (ASTELIN) 0.1 % nasal spray 1 spray into each nostril 2 (two) times a day Use in each nostril as directed (Patient not taking: Reported on 4/11/2025) 30 mL 1    benzonatate (TESSALON) 200 MG capsule Take 1 capsule (200 mg total) by mouth 3 (three) times a day as needed for cough (Patient not taking: Reported on 4/11/2025) 20 capsule 0    Calcium Ascorbate (VITAMIN C) 500 mg tablet Take 1 tablet (500 mg total) by mouth daily (Patient not taking: Reported on 4/11/2025)      Vitamin D, Cholecalciferol, 25 MCG (1000 UT) CAPS Take 1 tablet (1,000 Units total) by mouth daily after breakfast (Patient not taking: Reported on 4/11/2025)      zinc gluconate 50 mg  "tablet Take 1 tablet (50 mg total) by mouth daily (Patient not taking: Reported on 2025)       No current facility-administered medications on file prior to visit.      Social History     Tobacco Use    Smoking status: Former     Current packs/day: 0.00     Average packs/day: 0.3 packs/day for 58.0 years (14.5 ttl pk-yrs)     Types: Cigarettes     Start date:      Quit date:      Years since quittin.2    Smokeless tobacco: Never   Vaping Use    Vaping status: Never Used   Substance and Sexual Activity    Alcohol use: Yes    Drug use: Never    Sexual activity: Not on file        Objective   /74 (BP Location: Left arm, Patient Position: Sitting, Cuff Size: Standard)   Pulse 91   Temp 98 °F (36.7 °C) (Temporal)   Ht 5' 1.7\" (1.567 m)   Wt 61.1 kg (134 lb 12.8 oz)   SpO2 90%   BMI 24.90 kg/m²     Pain Screening:  Pain Score: 0-No pain  ECOG    Physical Exam  Vitals and nursing note reviewed.   Constitutional:       General: She is not in acute distress.     Appearance: Normal appearance. She is well-developed. She is not diaphoretic.   HENT:      Head: Normocephalic and atraumatic.      Nose: Nose normal. No congestion or rhinorrhea.      Mouth/Throat:      Mouth: Mucous membranes are moist.      Pharynx: Oropharynx is clear. No oropharyngeal exudate.   Eyes:      General: No scleral icterus.     Pupils: Pupils are equal, round, and reactive to light.   Neck:      Trachea: No tracheal deviation.   Cardiovascular:      Rate and Rhythm: Normal rate and regular rhythm.      Pulses: Normal pulses.      Heart sounds: Normal heart sounds. No murmur heard.  Pulmonary:      Effort: Pulmonary effort is normal. No respiratory distress.      Breath sounds: Normal breath sounds. No stridor. No wheezing or rales.   Chest:      Chest wall: No tenderness.   Abdominal:      General: Bowel sounds are normal. There is no distension.      Palpations: Abdomen is soft.      Tenderness: There is no abdominal " tenderness. There is no rebound.   Musculoskeletal:         General: Normal range of motion.      Cervical back: Normal range of motion and neck supple. No muscular tenderness.   Lymphadenopathy:      Cervical: No cervical adenopathy.   Skin:     General: Skin is warm and dry.      Coloration: Skin is not jaundiced or pale.      Findings: No erythema or rash.   Neurological:      General: No focal deficit present.      Mental Status: She is alert and oriented to person, place, and time.   Psychiatric:         Mood and Affect: Mood normal.         Behavior: Behavior normal.         Thought Content: Thought content normal.         Judgment: Judgment normal.         Labs:   Results for orders placed or performed in visit on 02/26/25   Covid/Flu- Office Collect Normal    Specimen: Nose; Nares   Result Value Ref Range    SARS-CoV-2 Negative Negative    INFLUENZA A PCR Positive (A) Negative    INFLUENZA B PCR Negative Negative        Radiology Results Review : I have reviewed images/report studies in PACS as described above (in the HPI).  Pathology: I have reviewed pathology reports described above.

## 2025-04-11 NOTE — TELEPHONE ENCOUNTER
Called and spoke to patient. Provided her a sooner appointment today with Dr. You in [place]. Patient is now scheduled for 12:30 today.

## 2025-04-11 NOTE — ASSESSMENT & PLAN NOTE
Today in the office, weight 1 conversation regarding this mass.  This is an approximately 4 cm mass located in the right lower lobe.  This is concerning for malignancy.  At this time, this needs to be worked up in its entirety.  A PET CT scan was already ordered and this is scheduled for later this month.  I am ordering pulmonary function testing for today so that we can get a baseline lung function.  I did explain to her that she will need to undergo a biopsy but I would like to wait for the PET CT scan in order to determine whether a navigation bronchoscopy or a percutaneous biopsy will be her best option.  She prefers to have a percutaneous biopsy so I will order that today and we will schedule that for after the PET CT scan    Orders:    Complete PFT with post bronchodilator; Future    Ambulatory Referral to Thoracic Surgery    Ambulatory referral to Interventional Radiology; Future

## 2025-04-11 NOTE — ASSESSMENT & PLAN NOTE
She has an incidentally identified 2.9 cm right thyroid nodule.  I have ordered a thyroid ultrasound to review this  Orders:    US thyroid; Future

## 2025-04-14 ENCOUNTER — RA CDI HCC (OUTPATIENT)
Dept: OTHER | Facility: HOSPITAL | Age: 80
End: 2025-04-14

## 2025-04-14 ENCOUNTER — HOSPITAL ENCOUNTER (OUTPATIENT)
Dept: ULTRASOUND IMAGING | Facility: HOSPITAL | Age: 80
Discharge: HOME | End: 2025-04-14
Attending: THORACIC SURGERY (CARDIOTHORACIC VASCULAR SURGERY)
Payer: COMMERCIAL

## 2025-04-14 DIAGNOSIS — E04.1 THYROID NODULE: ICD-10-CM

## 2025-04-14 PROCEDURE — 76536 US EXAM OF HEAD AND NECK: CPT

## 2025-04-15 ENCOUNTER — OFFICE VISIT (OUTPATIENT)
Dept: FAMILY MEDICINE CLINIC | Facility: CLINIC | Age: 80
End: 2025-04-15
Payer: COMMERCIAL

## 2025-04-15 VITALS
WEIGHT: 134 LBS | BODY MASS INDEX: 24.66 KG/M2 | DIASTOLIC BLOOD PRESSURE: 62 MMHG | TEMPERATURE: 99 F | OXYGEN SATURATION: 97 % | HEART RATE: 105 BPM | SYSTOLIC BLOOD PRESSURE: 132 MMHG | HEIGHT: 62 IN

## 2025-04-15 DIAGNOSIS — R91.8 LUNG MASS: Primary | ICD-10-CM

## 2025-04-15 DIAGNOSIS — E04.1 THYROID NODULE: ICD-10-CM

## 2025-04-15 DIAGNOSIS — M81.0 OSTEOPOROSIS WITHOUT CURRENT PATHOLOGICAL FRACTURE, UNSPECIFIED OSTEOPOROSIS TYPE: ICD-10-CM

## 2025-04-15 DIAGNOSIS — E04.1 THYROID NODULE: Primary | ICD-10-CM

## 2025-04-15 PROCEDURE — G2211 COMPLEX E/M VISIT ADD ON: HCPCS | Performed by: FAMILY MEDICINE

## 2025-04-15 PROCEDURE — 99214 OFFICE O/P EST MOD 30 MIN: CPT | Performed by: FAMILY MEDICINE

## 2025-04-15 NOTE — PROGRESS NOTES
Assessment/Plan:  Over 30 minutes was spent with the patient and her  reviewing her case and discussing the findings.  She is encouraged to proceed with thyroid biopsy and PET scan.  She will call me once these are done and we will discuss the findings.       Problem List Items Addressed This Visit       Lung mass - Primary    Proceed with PET scan.         Osteoporosis    Continue Fosamax 70 mg/week         Thyroid nodule    Proceed with thyroid biopsy              Subjective:      Patient ID: Debby Barrios is a 79 y.o. female.    Patient comes in because she is very confused regarding recent medical findings.  She had CAT scan of her chest which showed lung mass and thyroid nodules.  Thyroid biopsy was recommended and PET scan was ordered.        The following portions of the patient's history were reviewed and updated as appropriate:   Past Medical History:  She has no past medical history on file.,  _______________________________________________________________________  Medical Problems:  does not have any pertinent problems on file.,  _______________________________________________________________________  Past Surgical History:   has a past surgical history that includes Hysterectomy.,  _______________________________________________________________________  Family History:  family history includes Breast cancer in her sister; Cancer in her mother and sister; Emphysema in her father.,  _______________________________________________________________________  Social History:   reports that she quit smoking about 4 years ago. Her smoking use included cigarettes. She started smoking about 62 years ago. She has a 14.5 pack-year smoking history. She has never used smokeless tobacco. She reports current alcohol use. She reports that she does not use drugs.,  _______________________________________________________________________  Allergies:  is allergic to penicillins, cephalexin, promethazine, and sulfa  "antibiotics..  _______________________________________________________________________  Current Outpatient Medications   Medication Sig Dispense Refill    alendronate (Fosamax) 70 mg tablet Take 1 tablet (70 mg total) by mouth every 7 days 12 tablet 3     No current facility-administered medications for this visit.     _______________________________________________________________________  Review of Systems   Constitutional: Negative.    HENT: Negative.     Eyes: Negative.    Respiratory: Negative.     Cardiovascular: Negative.    Gastrointestinal: Negative.          Objective:  Vitals:    04/15/25 1413   BP: 132/62   Pulse: 105   Temp: 99 °F (37.2 °C)   SpO2: 97%   Weight: 60.8 kg (134 lb)   Height: 5' 1.7\" (1.567 m)     Body mass index is 24.75 kg/m².     Physical Exam  Constitutional:       Appearance: Normal appearance.   HENT:      Head: Normocephalic and atraumatic.   Neurological:      Mental Status: She is alert.   Psychiatric:         Mood and Affect: Mood normal.         Behavior: Behavior normal.         "

## 2025-04-15 NOTE — PROGRESS NOTES
I called patient with results of her recent thyroid ultrasound.  She has a right lower lobe thyroid nodule that is concerning.  We therefore recommend an ultrasound-guided thyroid biopsy with surgical oncology follow-up after.  Patient prefers both of these to be done near [place].  Referrals placed. All questions answered.    Amylyn Mortimer, PA-C  Thoracic Surgery

## 2025-04-16 ENCOUNTER — PREP FOR PROCEDURE (OUTPATIENT)
Dept: INTERVENTIONAL RADIOLOGY/VASCULAR | Facility: CLINIC | Age: 80
End: 2025-04-16

## 2025-04-16 DIAGNOSIS — R91.8 LUNG MASS: Primary | ICD-10-CM

## 2025-04-16 RX ORDER — SODIUM CHLORIDE 9 MG/ML
30 INJECTION, SOLUTION INTRAVENOUS CONTINUOUS
OUTPATIENT
Start: 2025-04-16

## 2025-04-22 ENCOUNTER — HOSPITAL ENCOUNTER (OUTPATIENT)
Dept: RADIOLOGY | Age: 80
Discharge: HOME | End: 2025-04-22
Payer: COMMERCIAL

## 2025-04-22 DIAGNOSIS — D38.1 NEOPLASM OF UNCERTAIN BEHAVIOR OF RIGHT LOWER LOBE OF LUNG: ICD-10-CM

## 2025-04-22 DIAGNOSIS — R91.8 LUNG MASS: ICD-10-CM

## 2025-04-22 LAB — GLUCOSE SERPL-MCNC: 71 MG/DL (ref 65–140)

## 2025-04-22 PROCEDURE — 78815 PET IMAGE W/CT SKULL-THIGH: CPT

## 2025-04-22 PROCEDURE — A9552 F18 FDG: HCPCS

## 2025-04-22 PROCEDURE — 82948 REAGENT STRIP/BLOOD GLUCOSE: CPT

## 2025-04-24 ENCOUNTER — HOSPITAL ENCOUNTER (OUTPATIENT)
Dept: ULTRASOUND IMAGING | Facility: HOSPITAL | Age: 80
End: 2025-04-24
Attending: PHYSICIAN ASSISTANT
Payer: COMMERCIAL

## 2025-04-24 ENCOUNTER — RESULTS FOLLOW-UP (OUTPATIENT)
Dept: FAMILY MEDICINE CLINIC | Facility: CLINIC | Age: 80
End: 2025-04-24

## 2025-04-24 DIAGNOSIS — E04.1 THYROID NODULE: ICD-10-CM

## 2025-04-24 PROCEDURE — 88173 CYTOPATH EVAL FNA REPORT: CPT | Performed by: PATHOLOGY

## 2025-04-24 PROCEDURE — 10005 FNA BX W/US GDN 1ST LES: CPT

## 2025-04-24 PROCEDURE — 88172 CYTP DX EVAL FNA 1ST EA SITE: CPT | Performed by: PATHOLOGY

## 2025-04-24 RX ORDER — LIDOCAINE HYDROCHLORIDE 10 MG/ML
5 INJECTION, SOLUTION EPIDURAL; INFILTRATION; INTRACAUDAL; PERINEURAL ONCE
Status: DISCONTINUED | OUTPATIENT
Start: 2025-04-24 | End: 2025-04-28 | Stop reason: HOSPADM

## 2025-04-28 PROCEDURE — 88173 CYTOPATH EVAL FNA REPORT: CPT | Performed by: PATHOLOGY

## 2025-04-28 PROCEDURE — 88172 CYTP DX EVAL FNA 1ST EA SITE: CPT | Performed by: PATHOLOGY

## 2025-05-01 ENCOUNTER — RESULTS FOLLOW-UP (OUTPATIENT)
Dept: CARDIAC SURGERY | Facility: CLINIC | Age: 80
End: 2025-05-01

## 2025-05-08 ENCOUNTER — TELEPHONE (OUTPATIENT)
Dept: FAMILY MEDICINE CLINIC | Facility: CLINIC | Age: 80
End: 2025-05-08

## 2025-05-08 NOTE — TELEPHONE ENCOUNTER
Pt stopped by the office :    An email containing a new, random password has been sent to the user's contact email:  [email or web address]

## 2025-05-14 ENCOUNTER — HOSPITAL ENCOUNTER (INPATIENT)
Facility: HOSPITAL | Age: 80
LOS: 2 days | Discharge: HOME | DRG: 810 | End: 2025-05-16
Attending: STUDENT IN AN ORGANIZED HEALTH CARE EDUCATION/TRAINING PROGRAM | Admitting: STUDENT IN AN ORGANIZED HEALTH CARE EDUCATION/TRAINING PROGRAM
Payer: COMMERCIAL

## 2025-05-14 ENCOUNTER — APPOINTMENT (INPATIENT)
Dept: CT IMAGING | Facility: HOSPITAL | Age: 80
DRG: 810 | End: 2025-05-14
Payer: COMMERCIAL

## 2025-05-14 DIAGNOSIS — D61.818 PANCYTOPENIA (HCC): Primary | ICD-10-CM

## 2025-05-14 PROBLEM — G44.52 HEADACHE, NEW DAILY PERSISTENT (NDPH): Status: ACTIVE | Noted: 2025-05-14

## 2025-05-14 LAB
ABO GROUP BLD: NORMAL
ABO GROUP BLD: NORMAL
ALBUMIN SERPL BCG-MCNC: 4 G/DL (ref 3.5–5)
ALP SERPL-CCNC: 75 U/L (ref 34–104)
ALT SERPL W P-5'-P-CCNC: 9 U/L (ref 7–52)
ANION GAP SERPL CALCULATED.3IONS-SCNC: 4 MMOL/L (ref 4–13)
AST SERPL W P-5'-P-CCNC: 14 U/L (ref 13–39)
BILIRUB SERPL-MCNC: 0.6 MG/DL (ref 0.2–1)
BLD GP AB SCN SERPL QL: NEGATIVE
BLD SMEAR INTERP: NORMAL
BUN SERPL-MCNC: 11 MG/DL (ref 5–25)
CALCIUM SERPL-MCNC: 8.6 MG/DL (ref 8.4–10.2)
CHLORIDE SERPL-SCNC: 108 MMOL/L (ref 96–108)
CO2 SERPL-SCNC: 28 MMOL/L (ref 21–32)
CREAT SERPL-MCNC: 0.66 MG/DL (ref 0.6–1.3)
ERYTHROCYTE [DISTWIDTH] IN BLOOD BY AUTOMATED COUNT: 18.2 % (ref 11.6–15.1)
FERRITIN SERPL-MCNC: 109 NG/ML (ref 30–307)
FIBRINOGEN PPP-MCNC: 303 MG/DL (ref 206–523)
FOLATE SERPL-MCNC: 15.9 NG/ML
GFR SERPL CREATININE-BSD FRML MDRD: 85 ML/MIN/1.73SQ M
GLUCOSE SERPL-MCNC: 99 MG/DL (ref 65–140)
HCT VFR BLD AUTO: 30.3 % (ref 34.8–46.1)
HGB BLD-MCNC: 10 G/DL (ref 11.5–15.4)
INR PPP: 0.99 (ref 0.85–1.19)
IRON SATN MFR SERPL: 50 % (ref 15–50)
IRON SERPL-MCNC: 129 UG/DL (ref 50–212)
LDH SERPL-CCNC: 143 U/L (ref 140–271)
MCH RBC QN AUTO: 34.6 PG (ref 26.8–34.3)
MCHC RBC AUTO-ENTMCNC: 33 G/DL (ref 31.4–37.4)
MCV RBC AUTO: 105 FL (ref 82–98)
PLATELET # BLD AUTO: 59 THOUSANDS/UL (ref 149–390)
PMV BLD AUTO: 9.6 FL (ref 8.9–12.7)
POTASSIUM SERPL-SCNC: 4.2 MMOL/L (ref 3.5–5.3)
PROT SERPL-MCNC: 7.4 G/DL (ref 6.4–8.4)
PROTHROMBIN TIME: 13.8 SECONDS (ref 12.3–15)
RBC # BLD AUTO: 2.89 MILLION/UL (ref 3.81–5.12)
RETICS # AUTO: NORMAL 10*3/UL (ref 14097–95744)
RETICS # CALC: 1.33 % (ref 0.37–1.87)
RH BLD: POSITIVE
RH BLD: POSITIVE
SODIUM SERPL-SCNC: 140 MMOL/L (ref 135–147)
SPECIMEN EXPIRATION DATE: NORMAL
TIBC SERPL-MCNC: 257.6 UG/DL (ref 250–450)
TRANSFERRIN SERPL-MCNC: 184 MG/DL (ref 203–362)
UIBC SERPL-MCNC: 129 UG/DL (ref 155–355)
VIT B12 SERPL-MCNC: 273 PG/ML (ref 180–914)
WBC # BLD AUTO: 3.51 THOUSAND/UL (ref 4.31–10.16)

## 2025-05-14 PROCEDURE — P9035 PLATELET PHERES LEUKOREDUCED: HCPCS

## 2025-05-14 PROCEDURE — 83615 LACTATE (LD) (LDH) ENZYME: CPT | Performed by: PHYSICIAN ASSISTANT

## 2025-05-14 PROCEDURE — 86850 RBC ANTIBODY SCREEN: CPT | Performed by: STUDENT IN AN ORGANIZED HEALTH CARE EDUCATION/TRAINING PROGRAM

## 2025-05-14 PROCEDURE — 99223 1ST HOSP IP/OBS HIGH 75: CPT | Performed by: PHYSICIAN ASSISTANT

## 2025-05-14 PROCEDURE — 99223 1ST HOSP IP/OBS HIGH 75: CPT | Performed by: STUDENT IN AN ORGANIZED HEALTH CARE EDUCATION/TRAINING PROGRAM

## 2025-05-14 PROCEDURE — 70450 CT HEAD/BRAIN W/O DYE: CPT

## 2025-05-14 PROCEDURE — 83540 ASSAY OF IRON: CPT | Performed by: PHYSICIAN ASSISTANT

## 2025-05-14 PROCEDURE — 85384 FIBRINOGEN ACTIVITY: CPT | Performed by: PHYSICIAN ASSISTANT

## 2025-05-14 PROCEDURE — 88185 FLOWCYTOMETRY/TC ADD-ON: CPT | Performed by: PHYSICIAN ASSISTANT

## 2025-05-14 PROCEDURE — 88184 FLOWCYTOMETRY/ TC 1 MARKER: CPT | Performed by: PHYSICIAN ASSISTANT

## 2025-05-14 PROCEDURE — 86901 BLOOD TYPING SEROLOGIC RH(D): CPT | Performed by: STUDENT IN AN ORGANIZED HEALTH CARE EDUCATION/TRAINING PROGRAM

## 2025-05-14 PROCEDURE — 80053 COMPREHEN METABOLIC PANEL: CPT | Performed by: PHYSICIAN ASSISTANT

## 2025-05-14 PROCEDURE — 84165 PROTEIN E-PHORESIS SERUM: CPT | Performed by: PHYSICIAN ASSISTANT

## 2025-05-14 PROCEDURE — 82607 VITAMIN B-12: CPT | Performed by: PHYSICIAN ASSISTANT

## 2025-05-14 PROCEDURE — 85610 PROTHROMBIN TIME: CPT | Performed by: PHYSICIAN ASSISTANT

## 2025-05-14 PROCEDURE — 85007 BL SMEAR W/DIFF WBC COUNT: CPT | Performed by: PHYSICIAN ASSISTANT

## 2025-05-14 PROCEDURE — 85027 COMPLETE CBC AUTOMATED: CPT | Performed by: STUDENT IN AN ORGANIZED HEALTH CARE EDUCATION/TRAINING PROGRAM

## 2025-05-14 PROCEDURE — 83010 ASSAY OF HAPTOGLOBIN QUANT: CPT | Performed by: PHYSICIAN ASSISTANT

## 2025-05-14 PROCEDURE — 86900 BLOOD TYPING SEROLOGIC ABO: CPT | Performed by: STUDENT IN AN ORGANIZED HEALTH CARE EDUCATION/TRAINING PROGRAM

## 2025-05-14 PROCEDURE — 82746 ASSAY OF FOLIC ACID SERUM: CPT | Performed by: PHYSICIAN ASSISTANT

## 2025-05-14 PROCEDURE — 86334 IMMUNOFIX E-PHORESIS SERUM: CPT | Performed by: PHYSICIAN ASSISTANT

## 2025-05-14 PROCEDURE — 85045 AUTOMATED RETICULOCYTE COUNT: CPT | Performed by: PHYSICIAN ASSISTANT

## 2025-05-14 PROCEDURE — 83550 IRON BINDING TEST: CPT | Performed by: PHYSICIAN ASSISTANT

## 2025-05-14 PROCEDURE — 82728 ASSAY OF FERRITIN: CPT | Performed by: PHYSICIAN ASSISTANT

## 2025-05-14 NOTE — ASSESSMENT & PLAN NOTE
Recent outpatient PET scan noted finding concerning of primary pulmonary neoplasm.  Ongoing decision with heme-onc and IR for inpatient IR biopsy.  Keep n.p.o. after midnight.  Likely plan for lung mass and bone marrow biopsy tomorrow.  IR, heme-onc following.

## 2025-05-14 NOTE — ASSESSMENT & PLAN NOTE
Approximately 3.8 cm lung mass in right lower lobe concerning for malignancy.  SUV 5.9 on PET scan.  Has a few left lung nodules with solid component however no significant FDG activity although may be below threshold for FDG activity given small size (8mm).   Lung biopsy recommend. IR planning for tomorrow.

## 2025-05-14 NOTE — H&P
H&P - Hospitalist   Name: Debby Barrios 79 y.o. female I MRN: [id number]  Unit/Bed#: -01 I Date of Admission: 5/14/2025   Date of Service: 5/14/2025 I Hospital Day: 0     Assessment & Plan  Pancytopenia (HCC)  79-year-old female patient with past medical history of thyroid nodule, lung mass, recent PET scan noted uptake of lung mass concerning for primary pulmonary neoplasm.  Patient was seen by IR for outpatient lung mass biopsy however routine labs noted with pancytopenia.    CBC noted with WBC of 3.35, hemoglobin 10.6, platelet 53,000  Prior labs from 09/2024 were within normal limits.    Currently patient is afebrile, hemodynamically stable.  Acutely nontoxic-appearing.  Follow-up with iron panel, folic acid, B12.  Follow-up with heme-onc consult.  Will transfuse platelet to optimize for inpatient bone marrow as well as possible lung mass biopsy based on heme-onc recommendation.  Keep NPO after midnight.   Consent obtained and placed in the chart for platelet transfusion.  Trend CBC.  Above has been discussed with patient's  and daughter at the bedside at length.  Lung mass  Recent outpatient PET scan noted finding concerning of primary pulmonary neoplasm.  Ongoing decision with heme-onc and IR for inpatient IR biopsy.  Keep n.p.o. after midnight.  Likely plan for lung mass and bone marrow biopsy tomorrow.  IR, heme-onc following.  Thyroid nodule  Recently had outpatient thyroid nodule biopsy and pathology report noted with atypia of undetermined significance.  PET scan negative for uptake.      VTE Pharmacologic Prophylaxis:   Moderate Risk (Score 3-4) - Pharmacological DVT Prophylaxis Contraindicated. Sequential Compression Devices Ordered.  Code Status: Level 1 - Full Code   Discussion with family: Updated  ( and daughter) at bedside.    Anticipated Length of Stay: Patient will be admitted on an inpatient basis with an anticipated length of stay of greater than 2 midnights  secondary to pancytopenia.    History of Present Illness   Chief Complaint: Pancytopenia    Debby Barrios is a 79 y.o. female with a PMH of osteoporosis, thyroid nodule, recently had thyroid nodule biopsy on  4/25/2025 pathology noted with atypia undetermined significance, recent outpatient PET scan report reviewed noted with FDG avid right lower lobe pulmonary mass, scattered groundglass pulmonary nodules, no FDG avid intrathoracic adenopathy, 3.2 cm right thyroid lobe nodules does not demonstrate FDG activity, patient was seen by IR for outpatient lung mass biopsy however routine labs noted with new onset pancytopenia therefore hospitalized under [facility] for heme-onc consult and optimization for possible bone marrow biopsy and lung biopsy.  Currently treatment with the patient appears comfortable not in distress.  She does complain about feeling fatigued, tired lately.  Denies chest pain, dyspnea, fever, chills, nausea, vomiting, hematuria, melena, hematochezia, any other new complaints.  No other events reported.  Patient reports family history of breast cancer however denies family history of lung cancer or lymphoma/leukemia.       Review of Systems   Constitutional:  Positive for fatigue. Negative for chills, diaphoresis and fever.   HENT:  Negative for congestion.    Eyes:  Negative for visual disturbance.   Respiratory:  Negative for cough and shortness of breath.    Cardiovascular:  Negative for chest pain, palpitations and leg swelling.   Gastrointestinal:  Negative for abdominal pain, diarrhea, nausea and vomiting.   Endocrine: Negative for polyuria.   Genitourinary:  Negative for dysuria and hematuria.   Neurological:  Positive for weakness.   Psychiatric/Behavioral:  Negative for agitation.        Historical Information   History reviewed. No pertinent past medical history.  Past Surgical History:   Procedure Laterality Date    HYSTERECTOMY      IR BIOPSY LUNG  5/14/2025    US GUIDED THYROID BIOPSY   2025     Social History     Tobacco Use    Smoking status: Former     Current packs/day: 0.00     Average packs/day: 0.3 packs/day for 58.0 years (14.5 ttl pk-yrs)     Types: Cigarettes     Start date:      Quit date:      Years since quittin.3    Smokeless tobacco: Never   Vaping Use    Vaping status: Never Used   Substance and Sexual Activity    Alcohol use: Yes    Drug use: Never    Sexual activity: Not on file     E-Cigarette/Vaping    E-Cigarette Use Never User      E-Cigarette/Vaping Substances    Nicotine No     THC No     CBD No     Flavoring No     Other No     Unknown No      Family History   Problem Relation Age of Onset    Cancer Mother         unknown    Emphysema Father     Breast cancer Sister     Cancer Sister      Social History:  Marital Status: /Civil Union       Meds/Allergies   I have reviewed home medications with a medical source (PCP, Pharmacy, other).  Prior to Admission medications    Medication Sig Start Date End Date Taking? Authorizing Provider   alendronate (Fosamax) 70 mg tablet Take 1 tablet (70 mg total) by mouth every 7 days 24   Eleazar Romo MD     Allergies   Allergen Reactions    Penicillins Other (See Comments), Swelling and Rash     Other reaction(s): swelling      Cephalexin Hives    Promethazine Delirium    Sulfa Antibiotics Hives       Objective :  Temp:  [97 °F (36.1 °C)-97.9 °F (36.6 °C)] 97.9 °F (36.6 °C)  HR:  [75-88] 88  BP: (111-158)/(56-76) 130/75  Resp:  [16-20] 17  SpO2:  [93 %-100 %] 97 %  O2 Device: None (Room air)  Nasal Cannula O2 Flow Rate (L/min):  [2 L/min] 2 L/min    Physical Exam  Constitutional:       General: She is not in acute distress.     Appearance: Normal appearance. She is not toxic-appearing or diaphoretic.   HENT:      Head: Normocephalic and atraumatic.     Cardiovascular:      Rate and Rhythm: Normal rate.      Pulses: Normal pulses.   Pulmonary:      Effort: Pulmonary effort is normal. No respiratory distress.       Breath sounds: Normal breath sounds. No wheezing or rales.   Abdominal:      General: Abdomen is flat. There is no distension.      Palpations: Abdomen is soft.      Tenderness: There is no abdominal tenderness. There is no guarding.     Musculoskeletal:      Right lower leg: No edema.      Left lower leg: No edema.     Skin:     Coloration: Skin is pale.     Neurological:      General: No focal deficit present.      Mental Status: She is alert and oriented to person, place, and time. Mental status is at baseline.     Psychiatric:         Mood and Affect: Mood normal.         Behavior: Behavior normal.          Lines/Drains:            Lab Results: I have reviewed the following results:  Results from last 7 days   Lab Units 05/14/25  0937   WBC Thousand/uL 3.35*   HEMOGLOBIN g/dL 10.6*   HEMATOCRIT % 31.8*   PLATELETS Thousands/uL 53*   SEGS PCT % 44   LYMPHO PCT % 38   MONO PCT % 16*   EOS PCT % 2         Results from last 7 days   Lab Units 05/14/25  0937   INR  0.98         Lab Results   Component Value Date    HGBA1C 5.2 08/30/2021    HGBA1C 5.2 08/30/2021                 Administrative Statements   I have spent a total time of   minutes in caring for this patient on the day of the visit/encounter including Diagnostic results, Prognosis, Risks and benefits of tx options, Instructions for management, Patient and family education, Importance of tx compliance, Risk factor reductions, Impressions, Counseling / Coordination of care, Documenting in the medical record, Reviewing/placing orders in the medical record (including tests, medications, and/or procedures), Obtaining or reviewing history  , and Communicating with other healthcare professionals .    ** Please Note: This note has been constructed using a voice recognition system. **

## 2025-05-14 NOTE — CONSULTS
Consultation - Oncology-Medical   Name: Debby Barrios 79 y.o. female I MRN: [id number]  Unit/Bed#: -01 I Date of Admission: 5/14/2025   Date of Service: 5/14/2025 I Hospital Day: 0   Inpatient consult to Hematology  Consult performed by: Ban Devlin PA-C  Consult ordered by: Dallin LONDONO MD        Physician Requesting Evaluation: Dallin LONDONO MD   Reason for Evaluation / Principal Problem: pancytopenia     Assessment & Plan  Pancytopenia (HCC)  Unclear etiology. Differential remains large including hematologic malignancy, PCN, hemolysis, PNH, DIC, nutritional deficiency, metastatic disease, and others. Unlikely drug side effect as only new medication was Fosamax in past 6 months.   Will obtain peripheral workup to evaluate for above.   Peripheral smear has been sent to evaluate for blast.  Pathologist is aware and will review when slide is received.  Recommend bone marrow biopsy for further evaluation. IR planning for tomorrow   Transfuse for hgb <7g/dL   Transfuse for PLT <15k or 50,000 with active bleeding   Hold DVT ppx for procedure   Lung mass  Approximately 3.8 cm lung mass in right lower lobe concerning for malignancy.  SUV 5.9 on PET scan.  Has a few left lung nodules with solid component however no significant FDG activity although may be below threshold for FDG activity given small size (8mm).   Lung biopsy recommend. IR planning for tomorrow.    Thyroid nodule  S/p FNA 04/25, path c/w AUS ([place] Category III)   Follow up with surgical oncology   Headache, new daily persistent (NDPH)  Recommend CT head woc r/o ICH  Likely will need Brain MRI r/o brain metastasis   Medications reviewed. Continue current medications at prescribed doses.  Recommendations above were discussed with primary team.    History of Present Illness   Debby Barrios is a 79 y.o. female who presents with past medical history of tobacco use, osteoporosis, psoriasis, thyroid nodule s/p thyroid biopsy 04/25,  lung mass who presented outpatient to IR for lung biopsy and was found to have pancytopenia on routine preprocedure labs.    After review of chart and discussion with patient, she reports cold symptoms approximately 3 to 3 months ago and was found to have influenza A around this time.  She had chest x-ray during evaluation for persistent cough which showed increased density in right middle lobe with a 18 mm cavitation.  Follow-up CT scan was performed which revealed persistent cavitary mass with spiculated margins in the lateral aspect of the right lower lobe approximately 3.8 cm in dimension concerning for malignancy.  There was also groundglass and solid nodule in the left lateral lung apex including and 8 mm solid component.  She was found to have a 2.9 cm right thyroid nodule on CAT scan as well.  PET scan was performed which again demonstrated the right lower lobe mass with SUV max 5.9.  No significant FDG activity in the left upper lobe nodules, mediastinal lymph nodes, abdomen, pelvis, or spine.  The thyroid nodule was again observed however did not demonstrate any FDG activity.    For the past few months she has been experiencing unintentional weight loss approximately 10 pounds total, worsening fatigue, and daily headaches that began approximately 1 month ago.  Headache is waking her up from sleep.  She had some intermittent diarrhea recently.  Denies chest pain, shortness breath, nausea vomiting, abdominal pain.  Has had back pain for 6 months or more.  No known liver or kidney disease.  Despite her fatigue she is still able to perform her ADLs, house chores, cooking etc. No recent travel.     Former smoker.  Smoked approximately 3 to 4 cigarettes/day for 60 years. Quit 5 years ago.  Consumes 1-2 beers per day.  No drug use.     No history of malignancy.  Her half-sister had breast cancer.  No other family history of cancer.    She is not up-to-date on mammogram or colonoscopy.    Review of Systems    Constitutional:         +damp intermittently at night    Hematological:  Negative for adenopathy. Bruises/bleeds easily.   All other systems reviewed and are negative.    Historical Information   Medical History Review: I have reviewed the patient's PMH, PSH, Social History, Family History, Meds, and Allergies     Oncology History:   Cancer Staging   No matching staging information was found for the patient.    Oncology History    No history exists.     Current Medications[1]    Objective :  Temp:  [97 °F (36.1 °C)-97.9 °F (36.6 °C)] 97.9 °F (36.6 °C)  HR:  [75-88] 88  BP: (111-158)/(56-76) 130/75  Resp:  [16-20] 17  SpO2:  [93 %-100 %] 97 %  O2 Device: None (Room air)  Nasal Cannula O2 Flow Rate (L/min):  [2 L/min] 2 L/min    Physical Exam  Vitals and nursing note reviewed.   Constitutional:       General: She is not in acute distress.     Appearance: She is well-developed.   HENT:      Head: Normocephalic and atraumatic.     Eyes:      Conjunctiva/sclera: Conjunctivae normal.       Cardiovascular:      Rate and Rhythm: Normal rate and regular rhythm.      Heart sounds: No murmur heard.  Pulmonary:      Effort: Pulmonary effort is normal. No respiratory distress.      Breath sounds: Wheezing present.     Musculoskeletal:         General: No swelling.      Cervical back: Neck supple.     Skin:     General: Skin is warm and dry.      Capillary Refill: Capillary refill takes less than 2 seconds.     Neurological:      General: No focal deficit present.      Mental Status: She is alert.      Motor: No weakness.     Psychiatric:         Mood and Affect: Mood normal.             Lab Results: I have reviewed the following results:  Lab Results   Component Value Date    K 4.2 05/14/2025     05/14/2025    CO2 28 05/14/2025    BUN 11 05/14/2025    CREATININE 0.66 05/14/2025    CALCIUM 8.6 05/14/2025    AST 14 05/14/2025    ALT 9 05/14/2025    ALKPHOS 75 05/14/2025    EGFR 85 05/14/2025     Lab Results   Component  Value Date    WBC 3.51 (L) 05/14/2025    HGB 10.0 (L) 05/14/2025    HCT 30.3 (L) 05/14/2025     (H) 05/14/2025    PLT 59 (L) 05/14/2025     Lab Results   Component Value Date    NEUTROABS 1.44 (L) 05/14/2025                    [1]   No current facility-administered medications for this encounter.     Facility-Administered Medications Ordered in Other Encounters   Medication Dose Route Frequency Provider Last Rate Last Admin    sodium chloride 0.9 % infusion  30 mL/hr Intravenous Continuous Sameera Montalvo MD

## 2025-05-14 NOTE — ASSESSMENT & PLAN NOTE
Recently had outpatient thyroid nodule biopsy and pathology report noted with atypia of undetermined significance.  PET scan negative for uptake.

## 2025-05-14 NOTE — ASSESSMENT & PLAN NOTE
79-year-old female patient with past medical history of thyroid nodule, lung mass, recent PET scan noted uptake of lung mass concerning for primary pulmonary neoplasm.  Patient was seen by IR for outpatient lung mass biopsy however routine labs noted with pancytopenia.    CBC noted with WBC of 3.35, hemoglobin 10.6, platelet 53,000  Prior labs from 09/2024 were within normal limits.    Currently patient is afebrile, hemodynamically stable.  Acutely nontoxic-appearing.  Follow-up with iron panel, folic acid, B12.  Follow-up with heme-onc consult.  Will transfuse platelet to optimize for inpatient bone marrow as well as possible lung mass biopsy based on heme-onc recommendation.  Keep NPO after midnight.   Consent obtained and placed in the chart for platelet transfusion.  Trend CBC.  Above has been discussed with patient's  and daughter at the bedside at length.

## 2025-05-14 NOTE — QUICK NOTE
Interventional Radiology Quick Note    79-year-old female presenting to interventional radiology earlier today, 5/14/2025 for right lung biopsy for further evaluation of right lower lobe mass.  CBC drawn prior to procedure with findings of thrombocytopenia.  Platelet count noted at 53,000.  Prior platelet count performed 9/18/2024 were 344,000.  She was also noted to have leukopenia with WBC count of 3.35; previously 9.41.  Right lung biopsy held for further evaluation of thrombocytopenia/pancytopenia.    Component      Latest Ref Rng 8/7/2024 9/18/2024 5/14/2025   Platelet Count      149 - 390 Thousands/uL 330  344  53 (L)       Component      Latest Ref Rng 5/14/2025   POCT INR      0.85 - 1.19  0.98        Plan for IR lung biopsy tomorrow, 5/15/2025, will anticipate need for bone marrow biopsypending heme-onc evaluation  Appreciate heme-onc recs  NPO after midnight  Hold all anticoagulants/antiplatelets  Please do not hesitate to contact interventional radiology for questions/concerns    RANDALL Luna

## 2025-05-14 NOTE — CASE MANAGEMENT
Case Management Assessment & Discharge Planning Note    Patient name Debby Chaparro  Location /-01 MRN [id number]  : 1945 Date 2025       Current Admission Date: 2025  Current Admission Diagnosis:Pancytopenia (HCC)   Patient Active Problem List    Diagnosis Date Noted    Pancytopenia (HCC) 2025    Lung mass 2025    Thyroid nodule 2025    Osteoporosis 2024    Bilateral low back pain without sciatica 2024    Neuropathy 2024    Myalgia 10/22/2024    Psoriasis 2023    Dyslipidemia 2023    Family history of breast cancer 2020      LOS (days): 0  Geometric Mean LOS (GMLOS) (days): 2.5  Days to GMLOS:2.4     OBJECTIVE:              Current admission status: Inpatient       Preferred Pharmacy:   [facility]/pharmacy #[id number] - [place] - [address]  [address]  [facility] [id number]  Phone: [phone] Fax: [phone]    Primary Care Provider: Eleazar Romo MD    Primary Insurance: [employer] REP  Secondary Insurance:     ASSESSMENT:  Active Health Care Proxies    There are no active Health Care Proxies on file.       Advance Directives  Primary Contact: uriah chaparro (Spouse)  [phone]         Readmission Root Cause  30 Day Readmission: No    Patient Information  Admitted from:: Home  Mental Status: Alert  During Assessment patient was accompanied by: Spouse, Daughter  Assessment information provided by:: Patient  Primary Caregiver: Self  Support Systems: Self, Spouse/significant other  County of Residence: [place]  What city do you live in?: [place]  Home entry access options. Select all that apply.: Ramp  Type of Current Residence: 2 story home  Upon entering residence, is there a bedroom on the main floor (no further steps)?: Yes  Upon entering residence, is there a bathroom on the main floor (no further steps)?: Yes  Living Arrangements: Lives w/ Spouse/significant other    Activities of Daily Living  Prior to Admission  Functional Status: Independent  Completes ADLs independently?: Yes  Ambulates independently?: Yes  Does patient use assisted devices?: No  Does patient currently own DME?: No  Does patient have a history of Outpatient Therapy (PT/OT)?: No  Does the patient have a history of Short-Term Rehab?: No  Does patient have a history of HHC?: No  Does patient currently have HHC?: No         Patient Information Continued  Income Source: Pension/senior care  Does patient have prescription coverage?: Yes  Can the patient afford their medications and any related supplies (such as glucometers or test strips)?: Yes  Does patient receive dialysis treatments?: No  Does patient have a history of substance abuse?: No  Does patient have a history of Mental Health Diagnosis?: No         Means of Transportation  Means of Transport to App:: Drives Self          DISCHARGE DETAILS:    Discharge planning discussed with:: Pt  Freedom of Choice: Yes  Comments - Freedom of Choice: CM met with pt and her  and daughter at bedside and introduced self/role. FOC discussed at length. Pt states she is independent with all of ADLs and IADLs. No CM needs anticiapted. CM dept contunues to follow.  CM contacted family/caregiver?: Yes  Were Treatment Team discharge recommendations reviewed with patient/caregiver?: Yes  Did patient/caregiver verbalize understanding of patient care needs?: Yes  Were patient/caregiver advised of the risks associated with not following Treatment Team discharge recommendations?: Yes    Contacts  Patient Contacts: uriah chaparro (Spouse)  [phone]  Relationship to Patient:: Family  Contact Method: In Person  Reason/Outcome: Continuity of Care, Emergency Contact, Referral, Discharge Planning    Requested Home Health Care         Is the patient interested in HHC at discharge?: No    DME Referral Provided  Referral made for DME?: No    Other Referral/Resources/Interventions Provided:  Interventions:  None Indicated    Would you like to [facility]?  : No - Declined    Treatment Team Recommendation: Home  Discharge Destination Plan:: Home  Transport at Discharge : Family

## 2025-05-14 NOTE — ASSESSMENT & PLAN NOTE
Unclear etiology. Differential remains large including hematologic malignancy, PCN, hemolysis, PNH, DIC, nutritional deficiency, metastatic disease, and others. Unlikely drug side effect as only new medication was Fosamax in past 6 months.   Will obtain peripheral workup to evaluate for above.   Peripheral smear has been sent to evaluate for blast.  Pathologist is aware and will review when slide is received.  Recommend bone marrow biopsy for further evaluation. IR planning for tomorrow   Transfuse for hgb <7g/dL   Transfuse for PLT <15k or 50,000 with active bleeding   Hold DVT ppx for procedure

## 2025-05-15 ENCOUNTER — APPOINTMENT (INPATIENT)
Dept: RADIOLOGY | Facility: HOSPITAL | Age: 80
DRG: 810 | End: 2025-05-15
Payer: COMMERCIAL

## 2025-05-15 ENCOUNTER — APPOINTMENT (INPATIENT)
Dept: CT IMAGING | Facility: HOSPITAL | Age: 80
DRG: 810 | End: 2025-05-15
Attending: NURSE PRACTITIONER
Payer: COMMERCIAL

## 2025-05-15 LAB
ABO GROUP BLD BPU: NORMAL
ALBUMIN SERPL ELPH-MCNC: 3.85 G/DL (ref 3.2–5.1)
ALBUMIN SERPL ELPH-MCNC: 54.2 % (ref 48–70)
ALPHA1 GLOB SERPL ELPH-MCNC: 0.25 G/DL (ref 0.15–0.47)
ALPHA1 GLOB SERPL ELPH-MCNC: 3.5 % (ref 1.8–7)
ALPHA2 GLOB SERPL ELPH-MCNC: 0.64 G/DL (ref 0.42–1.04)
ALPHA2 GLOB SERPL ELPH-MCNC: 9 % (ref 5.9–14.9)
ANISOCYTOSIS BLD QL SMEAR: PRESENT
BETA GLOB ABNORMAL SERPL ELPH-MCNC: 0.4 G/DL (ref 0.31–0.57)
BETA1 GLOB SERPL ELPH-MCNC: 5.7 % (ref 4.7–7.7)
BETA2 GLOB SERPL ELPH-MCNC: 7.2 % (ref 3.1–7.9)
BETA2+GAMMA GLOB SERPL ELPH-MCNC: 0.51 G/DL (ref 0.2–0.58)
BPU ID: NORMAL
EOSINOPHIL # BLD AUTO: 0.04 THOUSAND/UL (ref 0–0.61)
EOSINOPHIL NFR BLD MANUAL: 1 % (ref 0–6)
ERYTHROCYTE [DISTWIDTH] IN BLOOD BY AUTOMATED COUNT: 17.9 % (ref 11.6–15.1)
GAMMA GLOB ABNORMAL SERPL ELPH-MCNC: 1.45 G/DL (ref 0.4–1.66)
GAMMA GLOB SERPL ELPH-MCNC: 20.4 % (ref 6.9–22.3)
HAPTOGLOB SERPL-MCNC: 129 MG/DL (ref 42–346)
HCT VFR BLD AUTO: 26.1 % (ref 34.8–46.1)
HGB BLD-MCNC: 8.8 G/DL (ref 11.5–15.4)
IGA SERPL-MCNC: 443 MG/DL (ref 66–433)
IGG SERPL-MCNC: 1403 MG/DL (ref 635–1741)
IGG/ALB SER: 1.18 {RATIO} (ref 1.1–1.8)
IGM SERPL-MCNC: 92 MG/DL (ref 45–281)
LYMPHOCYTES # BLD AUTO: 1.94 THOUSAND/UL (ref 0.6–4.47)
LYMPHOCYTES # BLD AUTO: 54 %
M PROTEIN 1 SERPL ELPH-MCNC: 0.36 G/DL
MACROCYTES BLD QL AUTO: PRESENT
MCH RBC QN AUTO: 35.3 PG (ref 26.8–34.3)
MCHC RBC AUTO-ENTMCNC: 33.7 G/DL (ref 31.4–37.4)
MCV RBC AUTO: 105 FL (ref 82–98)
MONOCYTES # BLD AUTO: 0.29 THOUSAND/UL (ref 0–1.22)
MONOCYTES NFR BLD AUTO: 8 % (ref 4–12)
NEUTS SEG # BLD: 1.33 THOUSAND/UL (ref 1.81–6.82)
NEUTS SEG NFR BLD AUTO: 37 %
PATHOLOGY REVIEW: YES
PLATELET # BLD AUTO: 86 THOUSANDS/UL (ref 149–390)
PLATELET BLD QL SMEAR: ABNORMAL
PMV BLD AUTO: 8.9 FL (ref 8.9–12.7)
PROT SERPL-MCNC: 7.1 G/DL (ref 6.4–8.4)
RBC # BLD AUTO: 2.49 MILLION/UL (ref 3.81–5.12)
TOTAL CELLS COUNTED SPEC: 100
UNIT DISPENSE STATUS: NORMAL
UNIT PRODUCT CODE: NORMAL
UNIT PRODUCT VOLUME: 233 ML
UNIT RH: NORMAL
WBC # BLD AUTO: 3.59 THOUSAND/UL (ref 4.31–10.16)

## 2025-05-15 PROCEDURE — P9037 PLATE PHERES LEUKOREDU IRRAD: HCPCS

## 2025-05-15 PROCEDURE — 07DR3ZX EXTRACTION OF ILIAC BONE MARROW, PERCUTANEOUS APPROACH, DIAGNOSTIC: ICD-10-PCS | Performed by: RADIOLOGY

## 2025-05-15 PROCEDURE — 88341 IMHCHEM/IMCYTCHM EA ADD ANTB: CPT | Performed by: STUDENT IN AN ORGANIZED HEALTH CARE EDUCATION/TRAINING PROGRAM

## 2025-05-15 PROCEDURE — 6A551Z2 PHERESIS OF PLATELETS, MULTIPLE: ICD-10-PCS | Performed by: FAMILY MEDICINE

## 2025-05-15 PROCEDURE — 77012 CT SCAN FOR NEEDLE BIOPSY: CPT | Performed by: RADIOLOGY

## 2025-05-15 PROCEDURE — 99153 MOD SED SAME PHYS/QHP EA: CPT

## 2025-05-15 PROCEDURE — 88360 TUMOR IMMUNOHISTOCHEM/MANUAL: CPT | Performed by: STUDENT IN AN ORGANIZED HEALTH CARE EDUCATION/TRAINING PROGRAM

## 2025-05-15 PROCEDURE — 32408 CORE NDL BX LNG/MED PERQ: CPT

## 2025-05-15 PROCEDURE — C1830 POWER BONE MARROW BX NEEDLE: HCPCS

## 2025-05-15 PROCEDURE — 99152 MOD SED SAME PHYS/QHP 5/>YRS: CPT

## 2025-05-15 PROCEDURE — 88365 INSITU HYBRIDIZATION (FISH): CPT | Performed by: STUDENT IN AN ORGANIZED HEALTH CARE EDUCATION/TRAINING PROGRAM

## 2025-05-15 PROCEDURE — 88237 TISSUE CULTURE BONE MARROW: CPT | Performed by: FAMILY MEDICINE

## 2025-05-15 PROCEDURE — 38221 DX BONE MARROW BIOPSIES: CPT

## 2025-05-15 PROCEDURE — 88364 INSITU HYBRIDIZATION (FISH): CPT | Performed by: STUDENT IN AN ORGANIZED HEALTH CARE EDUCATION/TRAINING PROGRAM

## 2025-05-15 PROCEDURE — 84166 PROTEIN E-PHORESIS/URINE/CSF: CPT | Performed by: STUDENT IN AN ORGANIZED HEALTH CARE EDUCATION/TRAINING PROGRAM

## 2025-05-15 PROCEDURE — 82784 ASSAY IGA/IGD/IGG/IGM EACH: CPT | Performed by: STUDENT IN AN ORGANIZED HEALTH CARE EDUCATION/TRAINING PROGRAM

## 2025-05-15 PROCEDURE — 85060 BLOOD SMEAR INTERPRETATION: CPT | Performed by: PATHOLOGY

## 2025-05-15 PROCEDURE — 99233 SBSQ HOSP IP/OBS HIGH 50: CPT | Performed by: PHYSICIAN ASSISTANT

## 2025-05-15 PROCEDURE — 88184 FLOWCYTOMETRY/ TC 1 MARKER: CPT | Performed by: RADIOLOGY

## 2025-05-15 PROCEDURE — 99152 MOD SED SAME PHYS/QHP 5/>YRS: CPT | Performed by: RADIOLOGY

## 2025-05-15 PROCEDURE — 84165 PROTEIN E-PHORESIS SERUM: CPT | Performed by: PATHOLOGY

## 2025-05-15 PROCEDURE — 88342 IMHCHEM/IMCYTCHM 1ST ANTB: CPT | Performed by: STUDENT IN AN ORGANIZED HEALTH CARE EDUCATION/TRAINING PROGRAM

## 2025-05-15 PROCEDURE — 32408 CORE NDL BX LNG/MED PERQ: CPT | Performed by: RADIOLOGY

## 2025-05-15 PROCEDURE — 30233R1 TRANSFUSION OF NONAUTOLOGOUS PLATELETS INTO PERIPHERAL VEIN, PERCUTANEOUS APPROACH: ICD-10-PCS | Performed by: FAMILY MEDICINE

## 2025-05-15 PROCEDURE — 88185 FLOWCYTOMETRY/TC ADD-ON: CPT | Performed by: FAMILY MEDICINE

## 2025-05-15 PROCEDURE — 86335 IMMUNFIX E-PHORSIS/URINE/CSF: CPT | Performed by: STUDENT IN AN ORGANIZED HEALTH CARE EDUCATION/TRAINING PROGRAM

## 2025-05-15 PROCEDURE — 88305 TISSUE EXAM BY PATHOLOGIST: CPT | Performed by: STUDENT IN AN ORGANIZED HEALTH CARE EDUCATION/TRAINING PROGRAM

## 2025-05-15 PROCEDURE — 88311 DECALCIFY TISSUE: CPT | Performed by: STUDENT IN AN ORGANIZED HEALTH CARE EDUCATION/TRAINING PROGRAM

## 2025-05-15 PROCEDURE — 38221 DX BONE MARROW BIOPSIES: CPT | Performed by: RADIOLOGY

## 2025-05-15 PROCEDURE — 85007 BL SMEAR W/DIFF WBC COUNT: CPT | Performed by: RADIOLOGY

## 2025-05-15 PROCEDURE — 86334 IMMUNOFIX E-PHORESIS SERUM: CPT | Performed by: PATHOLOGY

## 2025-05-15 PROCEDURE — 71045 X-RAY EXAM CHEST 1 VIEW: CPT

## 2025-05-15 PROCEDURE — 88313 SPECIAL STAINS GROUP 2: CPT | Performed by: STUDENT IN AN ORGANIZED HEALTH CARE EDUCATION/TRAINING PROGRAM

## 2025-05-15 PROCEDURE — 0BBF3ZX EXCISION OF RIGHT LOWER LUNG LOBE, PERCUTANEOUS APPROACH, DIAGNOSTIC: ICD-10-PCS | Performed by: RADIOLOGY

## 2025-05-15 PROCEDURE — 88264 CHROMOSOME ANALYSIS 20-25: CPT | Performed by: FAMILY MEDICINE

## 2025-05-15 PROCEDURE — 88185 FLOWCYTOMETRY/TC ADD-ON: CPT | Performed by: RADIOLOGY

## 2025-05-15 PROCEDURE — 85027 COMPLETE CBC AUTOMATED: CPT | Performed by: STUDENT IN AN ORGANIZED HEALTH CARE EDUCATION/TRAINING PROGRAM

## 2025-05-15 PROCEDURE — 85097 BONE MARROW INTERPRETATION: CPT | Performed by: STUDENT IN AN ORGANIZED HEALTH CARE EDUCATION/TRAINING PROGRAM

## 2025-05-15 PROCEDURE — 88312 SPECIAL STAINS GROUP 1: CPT | Performed by: STUDENT IN AN ORGANIZED HEALTH CARE EDUCATION/TRAINING PROGRAM

## 2025-05-15 RX ORDER — ACETAMINOPHEN 325 MG/1
650 TABLET ORAL EVERY 6 HOURS PRN
Status: DISCONTINUED | OUTPATIENT
Start: 2025-05-15 | End: 2025-05-16 | Stop reason: HOSPADM

## 2025-05-15 RX ORDER — MAGNESIUM HYDROXIDE/ALUMINUM HYDROXICE/SIMETHICONE 120; 1200; 1200 MG/30ML; MG/30ML; MG/30ML
30 SUSPENSION ORAL EVERY 4 HOURS PRN
Status: DISCONTINUED | OUTPATIENT
Start: 2025-05-15 | End: 2025-05-16 | Stop reason: HOSPADM

## 2025-05-15 RX ORDER — FENTANYL CITRATE 50 UG/ML
INJECTION, SOLUTION INTRAMUSCULAR; INTRAVENOUS AS NEEDED
Status: COMPLETED | OUTPATIENT
Start: 2025-05-15 | End: 2025-05-15

## 2025-05-15 RX ORDER — LIDOCAINE WITH 8.4% SOD BICARB 0.9%(10ML)
SYRINGE (ML) INJECTION AS NEEDED
Status: COMPLETED | OUTPATIENT
Start: 2025-05-15 | End: 2025-05-15

## 2025-05-15 RX ORDER — ONDANSETRON 2 MG/ML
4 INJECTION INTRAMUSCULAR; INTRAVENOUS EVERY 4 HOURS PRN
Status: DISCONTINUED | OUTPATIENT
Start: 2025-05-15 | End: 2025-05-16 | Stop reason: HOSPADM

## 2025-05-15 RX ORDER — ONDANSETRON 2 MG/ML
INJECTION INTRAMUSCULAR; INTRAVENOUS AS NEEDED
Status: COMPLETED | OUTPATIENT
Start: 2025-05-15 | End: 2025-05-15

## 2025-05-15 RX ORDER — MIDAZOLAM HYDROCHLORIDE 2 MG/2ML
INJECTION, SOLUTION INTRAMUSCULAR; INTRAVENOUS AS NEEDED
Status: COMPLETED | OUTPATIENT
Start: 2025-05-15 | End: 2025-05-15

## 2025-05-15 RX ADMIN — MIDAZOLAM HYDROCHLORIDE 1 MG: 1 INJECTION, SOLUTION INTRAMUSCULAR; INTRAVENOUS at 11:13

## 2025-05-15 RX ADMIN — FENTANYL CITRATE 50 MCG: 50 INJECTION, SOLUTION INTRAMUSCULAR; INTRAVENOUS at 11:13

## 2025-05-15 RX ADMIN — FENTANYL CITRATE 25 MCG: 50 INJECTION, SOLUTION INTRAMUSCULAR; INTRAVENOUS at 11:26

## 2025-05-15 RX ADMIN — CYANOCOBALAMIN TAB 500 MCG 1000 MCG: 500 TAB at 19:11

## 2025-05-15 RX ADMIN — FENTANYL CITRATE 50 MCG: 50 INJECTION, SOLUTION INTRAMUSCULAR; INTRAVENOUS at 11:01

## 2025-05-15 RX ADMIN — ONDANSETRON 4 MG: 2 INJECTION INTRAMUSCULAR; INTRAVENOUS at 10:59

## 2025-05-15 RX ADMIN — ACETAMINOPHEN 650 MG: 325 TABLET, FILM COATED ORAL at 19:11

## 2025-05-15 RX ADMIN — ONDANSETRON 4 MG: 2 INJECTION INTRAMUSCULAR; INTRAVENOUS at 15:27

## 2025-05-15 RX ADMIN — Medication 8 ML: at 11:26

## 2025-05-15 RX ADMIN — Medication 8 ML: at 11:20

## 2025-05-15 RX ADMIN — MIDAZOLAM HYDROCHLORIDE 0.5 MG: 1 INJECTION, SOLUTION INTRAMUSCULAR; INTRAVENOUS at 11:26

## 2025-05-15 RX ADMIN — MIDAZOLAM HYDROCHLORIDE 1 MG: 1 INJECTION, SOLUTION INTRAMUSCULAR; INTRAVENOUS at 11:01

## 2025-05-15 NOTE — ASSESSMENT & PLAN NOTE
79-year-old female patient with past medical history of thyroid nodule, lung mass, recent PET scan noted uptake of lung mass concerning for primary pulmonary neoplasm.  Patient was seen by IR for outpatient lung mass biopsy however routine labs noted with pancytopenia.  Patient reports fatigue, weakness, night sweats, easy bruising, and bone pain in arms and legs since 11/24    CBC: WBC of 3.35, hemoglobin 10.6, platelet 53,000 at admission  CBC 5/15 WBC of 3.59, hemoglobin 8.8, platelet 86,000  Prior labs from 09/2024 were within normal limits.    Currently patient is afebrile, hemodynamically stable.  Acutely nontoxic-appearing.  Heme-onc following  Iron, folic acid, B12, LDH, are WNL   Received 1 platelet transfusion last night, platelet count 86,000 currently  IR Bone marrow biopsy performed 5/15, results pending  Trend CBC.

## 2025-05-15 NOTE — PLAN OF CARE
Problem: PAIN - ADULT  Goal: Verbalizes/displays adequate comfort level or baseline comfort level  Description: Interventions:  - Encourage patient to monitor pain and request assistance  - Assess pain using appropriate pain scale  - Administer analgesics as ordered based on type and severity of pain and evaluate response  - Implement non-pharmacological measures as appropriate and evaluate response  - Consider cultural and social influences on pain and pain management  - Notify physician/advanced practitioner if interventions unsuccessful or patient reports new pain  - Educate patient/family on pain management process including their role and importance of  reporting pain   - Provide non-pharmacologic/complimentary pain relief interventions  Outcome: Progressing     Problem: INFECTION - ADULT  Goal: Absence of fever/infection during neutropenic period  Description: INTERVENTIONS:  - Monitor WBC  - Perform strict hand hygiene  - Limit to healthy visitors only  - No plants, dried, fresh or silk flowers with stevenson in patient room  Outcome: Progressing     Problem: Knowledge Deficit  Goal: Patient/family/caregiver demonstrates understanding of disease process, treatment plan, medications, and discharge instructions  Description: Complete learning assessment and assess knowledge base.  Interventions:  - Provide teaching at level of understanding  - Provide teaching via preferred learning methods  Outcome: Progressing

## 2025-05-15 NOTE — SEDATION DOCUMENTATION
See bone marrow biopsy charting for vital signs, medication administration and wound documentation.

## 2025-05-15 NOTE — BRIEF OP NOTE (RAD/CATH)
INTERVENTIONAL RADIOLOGY PROCEDURE NOTE    Date: 5/15/2025    Procedure: IR BIOPSY  BONE MARROW  LUNG  Procedure Summary       Date:  Room / Location:     Anesthesia Start:  Anesthesia Stop:     Procedure:  Diagnosis:     Scheduled Providers:  Responsible Provider:     Anesthesia Type: Not recorded ASA Status: Not recorded            Preoperative diagnosis:   1. Pancytopenia (HCC)         Postoperative diagnosis: Same.    Surgeon: Sameera Montalvo MD     Assistant: None. No qualified resident was available.    Blood loss: 0    Specimens:     Left iliac bone marrow biopsy  Core touch flow aspirates    R lung nodule biopsy  18g cores x3 in formalin, x1 in flow       Findings:     Left iliac bone marrow biopsy, uncomplicated    R lung nodule biopsy  Bleeding into pleural space on initial follow up  Enlarged at 5 min post  Slightly enlarged 10 min post    I am hopeful bleeding will stop without angiogram in this thromboctypenic patient    Will bring to IR and monitor closely        Complications: None immediate.    Anesthesia: conscious sedation

## 2025-05-15 NOTE — INTERVAL H&P NOTE
Update: (This section must be completed if the H&P was completed greater than 24 hrs to procedure or admission)    H&P reviewed. After examining the patient, I find no changed to the H&P since it had been written.    79-year-old with FDG avid lung mass.  I have been involved in extensive discussions regarding her care    She presented for elective lung mass biopsy yesterday but was found to have new thrombocytopenia in the 50s    The higher baseline was 300s    This is in the setting of new pancytopenia    Biopsy was canceled and she was admitted    Platelets have been transfused and her platelets are now in the 80s    She now has been admitted and we will perform both procedures    She has some nausea we will give Zofran    Risks benefits alternatives discussed informed consent and plan    Risks of lung collapse needing chest tube hemoptysis reviewed    Appreciate pathology support    Patient re-evaluated. Accept as history and physical.    Sameera Montalvo MD/May 15, 2025/11:01 AM

## 2025-05-15 NOTE — CASE MANAGEMENT
Case Management Discharge Planning Note    Patient name Debby Barrios  Location /-01 MRN [id number]  : 1945 Date 5/15/2025       Current Admission Date: 2025  Current Admission Diagnosis:Pancytopenia (HCC)   Patient Active Problem List    Diagnosis Date Noted    Pancytopenia (HCC) 2025    Headache, new daily persistent (NDPH) 2025    Lung mass 2025    Thyroid nodule 2025    Osteoporosis 2024    Bilateral low back pain without sciatica 2024    Neuropathy 2024    Myalgia 10/22/2024    Psoriasis 2023    Dyslipidemia 2023    Family history of breast cancer 2020      LOS (days): 1  Geometric Mean LOS (GMLOS) (days): 2.5  Days to GMLOS:1.4     OBJECTIVE:  Risk of Unplanned Readmission Score: 5.66         Current admission status: Inpatient   Preferred Pharmacy:   [facility]/pharmacy #[id number] - [place] - [address]  [address]  [place] [address]  Phone: [phone] Fax: [phone]    Primary Care Provider: Eleazar Romo MD    Primary Insurance: [employer] REP  Secondary Insurance:     DISCHARGE DETAILS:     As per SLIM rounds, patient to undergo bone mass bx, lung mass bx. Possible transfer to [facility] or [facility]". CM to continue to follow.

## 2025-05-15 NOTE — ASSESSMENT & PLAN NOTE
Recently had outpatient thyroid nodule biopsy and pathology report noted with atypia of undetermined significance.  PET scan negative for uptake.   Patient was recommended to surgical oncology.

## 2025-05-15 NOTE — UTILIZATION REVIEW
NOTIFICATION OF INPATIENT ADMISSION   AUTHORIZATION REQUEST   SERVICING FACILITY:   [place] [address]  Tax ID: [id number]  NPI: [id number] ATTENDING PROVIDER:  Attending Name and NPI#: Ricardo Loza Md [[id number]]  Address: [address] [place] [address]  Phone: [phone]     ADMISSION INFORMATION:  Place of Service: Inpatient [facility]  Place of Service Code: 21  Inpatient Admission Date/Time: 5/14/25  1:23 PM  Discharge Date/Time: No discharge date for patient encounter.  Admitting Diagnosis Code/Description:  Platelets decreased (HCC) [D69.6]     UTILIZATION REVIEW CONTACT:  [employer] Utilization   Network Utilization Review Department  Phone: [phone]  Fax [phone]  Email: [email or web address]  Contact for approvals/pending authorizations, clinical reviews, and discharge.     PHYSICIAN ADVISORY SERVICES:  Medical Necessity Denial & Ksxq-yb-Myba Review  Phone: [phone]  Fax: [phone]  Email: [email or web address]     DISCHARGE SUPPORT TEAM:  For Patients Discharge Needs & Updates  Phone: [phone] opt. 2 Fax: [phone]  Email: [email or web address]

## 2025-05-15 NOTE — QUICK NOTE
Patient observed closely in the holding area after lung biopsy    Bleeding into the right pleural space.  We obtained a baseline x-ray    We transfused a unit of platelet    Patient remained stable       From IR perspective following recommendations:  -Okay for diet  -We will get x-ray tomorrow morning  -No anticoagulation of any kind  -If there is clinical decompensation obtain x-ray and then discussed with IR about utility of obtaining multiphase CT with noncontrast arterial delayed phase (3 phase)

## 2025-05-15 NOTE — ASSESSMENT & PLAN NOTE
Patient reports new headache since 11/24  No vision changes, focal deficits  CT Head w/o contrast: No acute intracranial abnormality. No acute intracranial hemorrhage, as clinically questioned. Mild chronic microangiopathy.  Will continue to monitor, will obtain MRI brain due to concern for unidentified malignancy

## 2025-05-15 NOTE — ASSESSMENT & PLAN NOTE
Recent outpatient PET scan noted finding concerning of primary pulmonary neoplasm.  IR, heme-onc following.  IR Biopsy performed 05/15, results pending

## 2025-05-15 NOTE — PROGRESS NOTES
Progress Note - Hospitalist   Name: Debby Barrios 79 y.o. female I MRN: [id number]  Unit/Bed#: -01 I Date of Admission: 5/14/2025   Date of Service: 5/15/2025 I Hospital Day: 1    Assessment & Plan  Pancytopenia (HCC)  79-year-old female patient with past medical history of thyroid nodule, lung mass, recent PET scan noted uptake of lung mass concerning for primary pulmonary neoplasm.  Patient was seen by IR for outpatient lung mass biopsy however routine labs noted with pancytopenia.  Patient reports fatigue, weakness, night sweats, easy bruising, and bone pain in arms and legs since 11/24    CBC: WBC of 3.35, hemoglobin 10.6, platelet 53,000 at admission  CBC 5/15 WBC of 3.59, hemoglobin 8.8, platelet 86,000  Prior labs from 09/2024 were within normal limits.    Currently patient is afebrile, hemodynamically stable.  Acutely nontoxic-appearing.  Heme-onc following  Iron, folic acid, B12, LDH, are WNL   Received 1 platelet transfusion last night, platelet count 86,000 currently  IR Bone marrow biopsy performed 5/15, results pending  Trend CBC.  Lung mass  Recent outpatient PET scan noted finding concerning of primary pulmonary neoplasm.  IR, heme-onc following.  IR Biopsy performed 05/15, results pending  Thyroid nodule  Recently had outpatient thyroid nodule biopsy and pathology report noted with atypia of undetermined significance.  PET scan negative for uptake.   Patient was recommended to surgical oncology.   Headache, new daily persistent (NDPH)  Patient reports new headache since 11/24  No vision changes, focal deficits  CT Head w/o contrast: No acute intracranial abnormality. No acute intracranial hemorrhage, as clinically questioned. Mild chronic microangiopathy.  Will continue to monitor, will obtain MRI brain due to concern for unidentified malignancy     VTE Pharmacologic Prophylaxis: VTE Score: 3 Moderate Risk (Score 3-4) - Pharmacological DVT Prophylaxis Contraindicated. Sequential Compression  Devices Ordered.    Mobility:   Basic Mobility Inpatient Raw Score: 24  JH-HLM Goal: 8: Walk 250 feet or more  JH-HLM Achieved: 7: Walk 25 feet or more  JH-HLM Goal achieved. Continue to encourage appropriate mobility.    Patient Centered Rounds: I performed bedside rounds with nursing staff today.   Discussions with Specialists or Other Care Team Provider: Heme/onc & IR    Education and Discussions with Family / Patient: Updated  (, daughter, and friend) at bedside.    Current Length of Stay: 1 day(s)  Current Patient Status: Inpatient   Certification Statement: The patient will continue to require additional inpatient hospital stay due to pancytopenia, in recovery from IR guided marrow and lung biopsy  Discharge Plan: Anticipate discharge tomorrow to home.    Code Status: Level 1 - Full Code    Subjective   Patient reports no overnight events. Patient generally feels ill, is not sleeping well, waking with night sweats, and is losing weight over the past several months. She notices occasional bone pain in her legs and arms. She notes that she has been bruising more easily. Symptoms have been progressing since 11/24. Patient denies cough or shortness of breath but reports hoarseness of her voice and the need to clear her throat almost any time she talks. Patient reports smoking about 1 pack per week for 60 years but that she quit a few years ago.    Objective :  Temp:  [97.4 °F (36.3 °C)-98.6 °F (37 °C)] 97.7 °F (36.5 °C)  HR:  [60-94] 81  BP: (119-154)/(55-68) 138/64  Resp:  [15-20] 16  SpO2:  [95 %-100 %] 99 %  O2 Device: None (Room air)    Body mass index is 24.31 kg/m².     Input and Output Summary (last 24 hours):     Intake/Output Summary (Last 24 hours) at 5/15/2025 1534  Last data filed at 5/15/2025 1346  Gross per 24 hour   Intake 1318 ml   Output --   Net 1318 ml       Physical Exam  Vitals and nursing note reviewed.   Constitutional:       General: She is awake.      Appearance:  Normal appearance. She is well-developed. She is not ill-appearing, toxic-appearing or diaphoretic.   HENT:      Head: Normocephalic and atraumatic.      Mouth/Throat:      Mouth: Mucous membranes are dry.     Eyes:      Conjunctiva/sclera: Conjunctivae normal.       Cardiovascular:      Rate and Rhythm: Normal rate and regular rhythm.   Pulmonary:      Effort: Pulmonary effort is normal. No respiratory distress.   Abdominal:      General: Abdomen is flat.      Palpations: Abdomen is soft.      Tenderness: There is no abdominal tenderness.     Musculoskeletal:      Right lower leg: No edema.      Left lower leg: No edema.     Skin:     General: Skin is warm and dry.      Capillary Refill: Capillary refill takes less than 2 seconds.      Coloration: Skin is not jaundiced, mottled or pale.      Findings: Bruising present.     Neurological:      General: No focal deficit present.      Mental Status: She is alert and oriented to person, place, and time. Mental status is at baseline.     Psychiatric:         Attention and Perception: Attention normal.         Mood and Affect: Mood and affect normal.         Behavior: Behavior normal. Behavior is cooperative.         Thought Content: Thought content normal.         Judgment: Judgment normal.         Lines/Drains:              Lab Results: I have reviewed the following results:   Results from last 7 days   Lab Units 05/15/25  0522 05/14/25  1520 05/14/25  0937   WBC Thousand/uL 3.59*   < > 3.35*   HEMOGLOBIN g/dL 8.8*   < > 10.6*   HEMATOCRIT % 26.1*   < > 31.8*   PLATELETS Thousands/uL 86*   < > 53*   SEGS PCT %  --   --  44   LYMPHO PCT % 54  --  38   MONO PCT % 8  --  16*   EOS PCT % 1  --  2    < > = values in this interval not displayed.     Results from last 7 days   Lab Units 05/14/25  1520   SODIUM mmol/L 140   POTASSIUM mmol/L 4.2   CHLORIDE mmol/L 108   CO2 mmol/L 28   BUN mg/dL 11   CREATININE mg/dL 0.66   ANION GAP mmol/L 4   CALCIUM mg/dL 8.6   ALBUMIN g/dL  4.0   TOTAL BILIRUBIN mg/dL 0.60   ALK PHOS U/L 75   ALT U/L 9   AST U/L 14   GLUCOSE RANDOM mg/dL 99     Results from last 7 days   Lab Units 05/14/25  1520   INR  0.99                   Recent Cultures (last 7 days):         Imaging Results Review: No pertinent imaging studies reviewed.  Other Study Results Review: No additional pertinent studies reviewed.    Last 24 Hours Medication List:     Current Facility-Administered Medications:     acetaminophen (TYLENOL) tablet 650 mg, Q6H PRN    aluminum-magnesium hydroxide-simethicone (MAALOX) oral suspension 30 mL, Q4H PRN    cyanocobalamin (VITAMIN B-12) tablet 1,000 mcg, Daily    ondansetron (ZOFRAN) injection 4 mg, Q4H PRN    Facility-Administered Medications Ordered in Other Encounters:     sodium chloride 0.9 % infusion, Continuous    Administrative Statements   Today, Patient Was Seen By: Meera Ortiz PA-C  I have spent a total time of 65 minutes in caring for this patient on the day of the visit/encounter including Diagnostic results, Patient and family education, Impressions, Counseling / Coordination of care, Documenting in the medical record, Reviewing/placing orders in the medical record (including tests, medications, and/or procedures), Obtaining or reviewing history  , and Communicating with other healthcare professionals .    **Please Note: This note may have been constructed using a voice recognition system.**

## 2025-05-15 NOTE — UTILIZATION REVIEW
Initial Clinical Review    Admission: Date/Time/Statement:   Admission Orders (From admission, onward)       Ordered        05/14/25 1331  Inpatient Admission  Once                          Orders Placed This Encounter   Procedures    Inpatient Admission     Standing Status:   Standing     Number of Occurrences:   1     Level of Care:   Med Surg [16]     Estimated length of stay:   More than 2 Midnights     Certification:   I certify that inpatient services are medically necessary for this patient for a duration of greater than two midnights. See H&P and MD Progress Notes for additional information about the patient's course of treatment.         Initial Presentation: 79 y.o. female to ED from IR w/ a PMH of osteoporosis, thyroid nodule, recently had thyroid nodule biopsy on  4/25/2025 pathology noted with atypia undetermined significance, recent outpatient PET scan report reviewed noted with FDG avid right lower lobe pulmonary mass, scattered groundglass pulmonary nodules, no FDG avid intrathoracic adenopathy, 3.2 cm right thyroid lobe nodules does not demonstrate FDG activity, patient was seen by IR for outpatient lung mass biopsy however routine labs noted with new onset pancytopenia therefore hospitalized under [facility] for heme-onc consult and optimization for possible bone marrow biopsy and lung biopsy.  Wbc 3.35 , plt ct 53k . Admitted Ip status w/ pancytopenia plan NPO after MN , Fe panel , folic acid , B12 , trend cbc . Lung Mass Likely plan for lung mass and bone marrow biopsy tomorrow.  IR, heme-onc following.    Anticipated Length of Stay/Certification Statement:  Patient will be admitted on an inpatient basis with an anticipated length of stay of greater than 2 midnights secondary to pancytopenia.     5/14 Oncology Consult   Pancytopenia Recommend bone marrow biopsy for further evaluation. IR planning for tomorrow . Transfuse for hgb <7g/dL   Transfuse for PLT <15k or 50,000 with active bleeding . Hold DVT  ppx for procedure . Lung biopsy recommend. IR planning for tomorrow.      Date:  5/15  Day 2: c/o occas bone pain in legs, wakes w/ night sweats and is losing weight over the past several months .  obtain MRI brain due to concern for unidentified malignancy . Cont stay for pancytopenia, in recovery from IR guided marrow and lung biopsy     5/15 OP Note   Pancytopenia Left iliac bone marrow biopsy  Core touch flow aspirates. Findings Left iliac bone marrow biopsy, uncomplicated   R lung nodule biopsy  Bleeding into pleural space on initial follow up. Enlarged at 5 min post  Slightly enlarged 10 min post       Date: 5/16  Day 3: Has surpassed a 2nd midnight with active treatments and services.  DC to home .      Scheduled Medications:         Temperature Pulse Respirations Blood Pressure SpO2 Pain Score   05/14/25 1332 05/14/25 1332 05/14/25 1332 05/14/25 1332 05/14/25 1332 05/14/25 1335   97.9 °F (36.6 °C) 88 17 130/75 97 % No Pain     Weight (last 2 days)       Date/Time Weight    05/14/25 1300 60.3 (132.94)            Vital Signs (last 3 days)       Date/Time Temp Pulse Resp BP MAP (mmHg) SpO2 O2 Device Patient Position - Orthostatic VS Pain    05/16/25 1048 -- -- -- -- -- -- -- -- No Pain    05/16/25 0800 98.3 °F (36.8 °C) 81 18 122/54 -- 98 % -- -- --    05/16/25 07:28:31 98.3 °F (36.8 °C) 95 19 132/69 90 95 % -- -- --    05/16/25 04:09:11 98 °F (36.7 °C) 85 -- 122/70 87 96 % -- -- --    05/16/25 04:08:37 98 °F (36.7 °C) 85 -- 122/70 87 95 % -- -- --    05/16/25 0400 98 °F (36.7 °C) 85 16 122/70 -- 96 % -- -- --    05/16/25 0000 98.4 °F (36.9 °C) 94 14 119/71 -- 95 % -- -- --    05/15/25 23:33:03 98.4 °F (36.9 °C) 94 -- 119/71 87 95 % -- -- --    05/15/25 20:43:18 98.1 °F (36.7 °C) 84 14 118/54 75 96 % -- -- --    05/15/25 1912 -- -- -- -- -- -- -- -- 5    05/15/25 1911 -- -- -- -- -- -- -- -- 5    05/15/25 15:38:02 98.2 °F (36.8 °C) 87 15 148/61 90 95 % -- -- --    05/15/25 1530 -- -- -- -- -- -- None (Room air)  -- No Pain    05/15/25 1430 -- -- 16 -- -- -- None (Room air) -- --    05/15/25 1400 -- -- -- -- -- -- None (Room air) -- --    05/15/25 1335 97.7 °F (36.5 °C) 81 19 138/64 -- 99 % None (Room air) -- 5    05/15/25 1330 97.6 °F (36.4 °C) 79 18 129/62 -- 98 % -- -- --    05/15/25 1325 97.4 °F (36.3 °C) 75 18 128/60 -- 96 % None (Room air) -- --    05/15/25 1321 97.5 °F (36.4 °C) 65 16 127/59 -- -- -- -- --    05/15/25 1315 97.5 °F (36.4 °C) 69 17 119/59 85 98 % None (Room air) -- --    05/15/25 1300 -- 60 16 128/58 83 96 % -- -- No Pain    05/15/25 1245 -- 67 17 119/57 82 97 % -- -- No Pain    05/15/25 1230 -- 65 16 129/55 82 97 % -- -- --    05/15/25 1225 -- -- -- -- -- -- -- -- No Pain    05/15/25 1200 -- 81 16 128/60 -- 100 % -- -- No Pain    05/15/25 1155 -- 82 16 120/60 -- 100 % -- -- --    05/15/25 1150 -- 81 16 127/57 -- 100 % -- -- --    05/15/25 1145 -- 79 16 132/63 -- 100 % -- -- --    05/15/25 1140 -- 81 15 130/61 -- 100 % -- -- --    05/15/25 1135 -- 82 16 134/63 -- 100 % -- -- --    05/15/25 1130 -- 85 16 124/57 -- 100 % -- -- --    05/15/25 1125 -- 80 16 133/65 -- 100 % -- -- --    05/15/25 1120 -- 82 16 129/60 -- 100 % -- -- --    05/15/25 1115 -- 81 16 129/56 -- 99 % -- -- --    05/15/25 1110 -- 84 16 130/61 -- 100 % -- -- --    05/15/25 1105 -- 87 16 132/59 -- 100 % -- -- --    05/15/25 1100 -- 81 16 149/68 -- 100 % -- -- --    05/15/25 0931 98.6 °F (37 °C) 80 20 154/66 -- 99 % None (Room air) -- No Pain    05/15/25 07:25:31 98 °F (36.7 °C) 76 19 121/57 78 96 % -- -- --    05/14/25 2309 98.3 °F (36.8 °C) 86 16 130/67 -- 96 % None (Room air) Lying --    05/14/25 22:48:11 98.3 °F (36.8 °C) 94 -- 122/66 85 95 % -- -- --    05/14/25 2156 98.4 °F (36.9 °C) 86 16 128/68 -- 96 % -- -- --    05/14/25 2141 98.2 °F (36.8 °C) 81 17 128/68 -- -- -- -- --    05/14/25 2100 98.6 °F (37 °C) 82 16 127/60 82 97 % None (Room air) Sitting No Pain    05/14/25 20:55:55 98.6 °F (37 °C) 80 -- 127/60 82 98 % -- -- --     05/14/25 1335 -- -- -- -- -- -- -- -- No Pain    05/14/25 13:34:02 97.9 °F (36.6 °C) 88 17 130/75 93 97 % None (Room air) -- --    05/14/25 1332 97.9 °F (36.6 °C) 88 17 130/75 -- 97 % -- -- --              Pertinent Labs/Diagnostic Test Results:   Radiology:  XR chest portable   Final Interpretation by Chente Keith MD (05/15 1654)      Decreased appearance right hematoma.      Question small lower right extrathoracic hematoma.            Workstation performed: [id number]         XR chest portable   Final Interpretation by Chente Keith MD (05/15 1637)      Peripheral posterior right base hematoma.      Findings concur with the preliminary report by the referring clinician already in PACS and/or our electronic record EPIC.            Workstation performed: [id number]         CT head wo contrast   Final Interpretation by Eleazar Perales MD (05/14 1719)      No acute intracranial abnormality. No acute intracranial hemorrhage, as clinically questioned.      Mild chronic microangiopathy.                  Workstation performed: [id number]         IR biopsy lung    (Results Pending)   IR biopsy bone    (Results Pending)   XR chest portable    (Results Pending)   MRI inpatient order    (Results Pending)     Cardiology:  No orders to display     GI:  No orders to display           Results from last 7 days   Lab Units 05/16/25  0453 05/15/25  0522 05/14/25  1520 05/14/25  0937   WBC Thousand/uL 3.57* 3.59* 3.51* 3.35*   HEMOGLOBIN g/dL 8.6* 8.8* 10.0* 10.6*   HEMATOCRIT % 26.6* 26.1* 30.3* 31.8*   PLATELETS Thousands/uL 133* 86* 59* 53*   TOTAL NEUT ABS Thousand/uL  --  1.33*  --  1.44*   BANDS PCT %  --   --  2  --      Results from last 7 days   Lab Units 05/14/25  1520   RETIC CT ABS  38,000   RETIC CT PCT % 1.33     Results from last 7 days   Lab Units 05/16/25  0453 05/14/25  1520   SODIUM mmol/L 137 140   POTASSIUM mmol/L 3.8 4.2   CHLORIDE mmol/L 107 108   CO2 mmol/L 26 28   ANION GAP mmol/L 4 4   BUN  mg/dL 8 11   CREATININE mg/dL 0.48* 0.66   EGFR ml/min/1.73sq m 94 85   CALCIUM mg/dL 8.5 8.6     Results from last 7 days   Lab Units 05/14/25  1520   AST U/L 14   ALT U/L 9   ALK PHOS U/L 75   TOTAL PROTEIN g/dL 7.4  7.1   ALBUMIN g/dL 4.0   TOTAL BILIRUBIN mg/dL 0.60         Results from last 7 days   Lab Units 05/16/25  0453 05/14/25  1520   GLUCOSE RANDOM mg/dL 86 99     Results from last 7 days   Lab Units 05/14/25  1520 05/14/25  0937   PROTIME seconds 13.8 13.7   INR  0.99 0.98       Results from last 7 days   Lab Units 05/14/25  1520   FERRITIN ng/mL 109   IRON SATURATION % 50   IRON ug/dL 129   TIBC ug/dL 257.6     Results from last 7 days   Lab Units 05/14/25  1520   TRANSFERRIN mg/dL 184*     Results from last 7 days   Lab Units 05/16/25  0750 05/15/25  0410   UNIT PRODUCT CODE  [id number]  [id number]  H6246O64 [id number]   UNIT NUMBER  [id number]  [id number]  [id number]   UNITABO  O  O  O A   UNITRH  POS  POS  POS POS   UNIT DISPENSE STATUS  Return to Inv  Presumed Trans  Crossmatched Presumed Trans   UNIT PRODUCT VOL mL 238  243  300 233         History reviewed. No pertinent past medical history.  Present on Admission:   Lung mass   Thyroid nodule   Pancytopenia (HCC)      Admitting Diagnosis: Platelets decreased (HCC) [D69.6]  Age/Sex: 79 y.o. female    Network Utilization Review Department  ATTENTION: Please call with any questions or concerns to [phone] and carefully listen to the prompts so that you are directed to the right person. All voicemails are confidential.   For Discharge needs, contact Care Management DC Support Team at [phone] opt. [phone]  Send all requests for admission clinical reviews, approved or denied determinations and any other requests to dedicated fax number below belonging to the campus where the patient is receiving treatment. List of dedicated fax numbers for the Facilities:  FACILITY NAME UR FAX NUMBER   ADMISSION DENIALS  (Administrative/Medical Necessity) [phone]   DISCHARGE SUPPORT TEAM (NETWORK) [phone]   PARENT CHILD HEALTH (Maternity/NICU/Pediatrics) [phone]   [facility] [phone]   [facility] [phone]   [facility] [phone]   [facility] [phone]   [facility] [phone]   [facility] [phone]   [facility] [phone]   [facility] [phone]   [facility] [phone]   [facility] [phone]   [facility] [phone]   [facility] [phone]

## 2025-05-16 ENCOUNTER — APPOINTMENT (INPATIENT)
Dept: MRI IMAGING | Facility: HOSPITAL | Age: 80
DRG: 810 | End: 2025-05-16
Payer: COMMERCIAL

## 2025-05-16 ENCOUNTER — TELEPHONE (OUTPATIENT)
Dept: HEMATOLOGY ONCOLOGY | Facility: CLINIC | Age: 80
End: 2025-05-16

## 2025-05-16 ENCOUNTER — TRANSITIONAL CARE MANAGEMENT (OUTPATIENT)
Dept: FAMILY MEDICINE CLINIC | Facility: CLINIC | Age: 80
End: 2025-05-16

## 2025-05-16 ENCOUNTER — APPOINTMENT (INPATIENT)
Dept: RADIOLOGY | Facility: HOSPITAL | Age: 80
DRG: 810 | End: 2025-05-16
Payer: COMMERCIAL

## 2025-05-16 VITALS
BODY MASS INDEX: 24.46 KG/M2 | HEART RATE: 84 BPM | SYSTOLIC BLOOD PRESSURE: 120 MMHG | OXYGEN SATURATION: 96 % | HEIGHT: 62 IN | WEIGHT: 132.94 LBS | RESPIRATION RATE: 16 BRPM | DIASTOLIC BLOOD PRESSURE: 65 MMHG | TEMPERATURE: 98.5 F

## 2025-05-16 LAB
ABO GROUP BLD BPU: NORMAL
ALBUMIN UR ELPH-MCNC: 22.5 %
ALPHA1 GLOB MFR UR ELPH: 8.5 %
ALPHA2 GLOB MFR UR ELPH: 12.1 %
ANION GAP SERPL CALCULATED.3IONS-SCNC: 4 MMOL/L (ref 4–13)
B-GLOBULIN MFR UR ELPH: 3.6 %
BPU ID: NORMAL
BUN SERPL-MCNC: 8 MG/DL (ref 5–25)
CALCIUM SERPL-MCNC: 8.5 MG/DL (ref 8.4–10.2)
CHLORIDE SERPL-SCNC: 107 MMOL/L (ref 96–108)
CO2 SERPL-SCNC: 26 MMOL/L (ref 21–32)
CREAT SERPL-MCNC: 0.48 MG/DL (ref 0.6–1.3)
EOSINOPHIL NFR BLD MANUAL: 1 % (ref 0–6)
ERYTHROCYTE [DISTWIDTH] IN BLOOD BY AUTOMATED COUNT: 17.5 % (ref 11.6–15.1)
GAMMA GLOB MFR UR ELPH: 53.3 %
GFR SERPL CREATININE-BSD FRML MDRD: 94 ML/MIN/1.73SQ M
GLUCOSE SERPL-MCNC: 86 MG/DL (ref 65–140)
HCT VFR BLD AUTO: 26.6 % (ref 34.8–46.1)
HGB BLD-MCNC: 8.6 G/DL (ref 11.5–15.4)
LYMPHOCYTES # BLD AUTO: 41 %
MCH RBC QN AUTO: 34.4 PG (ref 26.8–34.3)
MCHC RBC AUTO-ENTMCNC: 32.3 G/DL (ref 31.4–37.4)
MCV RBC AUTO: 106 FL (ref 82–98)
MONOCYTES NFR BLD AUTO: 9 % (ref 4–12)
NEUTS BAND NFR BLD MANUAL: 2 % (ref 0–8)
NEUTS SEG NFR BLD AUTO: 47 %
PATHOLOGY REVIEW: YES
PLATELET # BLD AUTO: 133 THOUSANDS/UL (ref 149–390)
PLATELET BLD QL SMEAR: ABNORMAL
PMV BLD AUTO: 9.9 FL (ref 8.9–12.7)
POTASSIUM SERPL-SCNC: 3.8 MMOL/L (ref 3.5–5.3)
PROT UR-MCNC: 16.6 MG/DL
RBC # BLD AUTO: 2.5 MILLION/UL (ref 3.81–5.12)
SODIUM SERPL-SCNC: 137 MMOL/L (ref 135–147)
TOTAL CELLS COUNTED SPEC: 100
UNIT DISPENSE STATUS: NORMAL
UNIT PRODUCT CODE: NORMAL
UNIT PRODUCT VOLUME: 238 ML
UNIT PRODUCT VOLUME: 243 ML
UNIT PRODUCT VOLUME: 300 ML
UNIT RH: NORMAL
WBC # BLD AUTO: 3.57 THOUSAND/UL (ref 4.31–10.16)

## 2025-05-16 PROCEDURE — 83521 IG LIGHT CHAINS FREE EACH: CPT | Performed by: STUDENT IN AN ORGANIZED HEALTH CARE EDUCATION/TRAINING PROGRAM

## 2025-05-16 PROCEDURE — A9585 GADOBUTROL INJECTION: HCPCS | Performed by: PSYCHIATRY & NEUROLOGY

## 2025-05-16 PROCEDURE — 70553 MRI BRAIN STEM W/O & W/DYE: CPT

## 2025-05-16 PROCEDURE — 99239 HOSP IP/OBS DSCHRG MGMT >30: CPT

## 2025-05-16 PROCEDURE — 80048 BASIC METABOLIC PNL TOTAL CA: CPT | Performed by: PHYSICIAN ASSISTANT

## 2025-05-16 PROCEDURE — 71045 X-RAY EXAM CHEST 1 VIEW: CPT

## 2025-05-16 PROCEDURE — 84166 PROTEIN E-PHORESIS/URINE/CSF: CPT | Performed by: PATHOLOGY

## 2025-05-16 PROCEDURE — 85027 COMPLETE CBC AUTOMATED: CPT | Performed by: PHYSICIAN ASSISTANT

## 2025-05-16 PROCEDURE — 86335 IMMUNFIX E-PHORSIS/URINE/CSF: CPT | Performed by: PATHOLOGY

## 2025-05-16 PROCEDURE — 99232 SBSQ HOSP IP/OBS MODERATE 35: CPT

## 2025-05-16 RX ORDER — GADOBUTROL 604.72 MG/ML
6 INJECTION INTRAVENOUS
Status: COMPLETED | OUTPATIENT
Start: 2025-05-16 | End: 2025-05-16

## 2025-05-16 RX ADMIN — GADOBUTROL 6 ML: 604.72 INJECTION INTRAVENOUS at 14:55

## 2025-05-16 RX ADMIN — CYANOCOBALAMIN TAB 500 MCG 1000 MCG: 500 TAB at 09:03

## 2025-05-16 NOTE — ASSESSMENT & PLAN NOTE
Recent outpatient PET scan noted finding concerning of primary pulmonary neoplasm.  IR, heme-onc following.  IR Biopsy performed 05/15, results pending  Follow-up outpatient with Oncology appointment scheduled

## 2025-05-16 NOTE — ASSESSMENT & PLAN NOTE
79-year-old female patient with past medical history of thyroid nodule, lung mass, recent PET scan noted uptake of lung mass concerning for primary pulmonary neoplasm.  Patient was seen by IR for outpatient lung mass biopsy however routine labs noted with pancytopenia.  Patient reports fatigue, weakness, night sweats, easy bruising, and bone pain in arms and legs since 11/24    CBC: WBC of 3.35, hemoglobin 10.6, platelet 53,000 at admission  CBC 5/15 WBC of 3.59, hemoglobin 8.8, platelet 86,000  Prior labs from 09/2024 were within normal limits.    Currently patient is afebrile, hemodynamically stable.  Acutely nontoxic-appearing.  Heme-onc following  Iron, folic acid, B12, LDH, are WNL   Received 1 platelet transfusion last night, platelet count stable at 133,000  IR Bone marrow biopsy performed 5/15, results pending  Trend CBC.  Stable for discharge, has appointment scheduled with outpatient Oncology

## 2025-05-16 NOTE — PLAN OF CARE
Problem: PAIN - ADULT  Goal: Verbalizes/displays adequate comfort level or baseline comfort level  Description: Interventions:  - Encourage patient to monitor pain and request assistance  - Assess pain using appropriate pain scale  - Administer analgesics as ordered based on type and severity of pain and evaluate response  - Implement non-pharmacological measures as appropriate and evaluate response  - Consider cultural and social influences on pain and pain management  - Notify physician/advanced practitioner if interventions unsuccessful or patient reports new pain  - Educate patient/family on pain management process including their role and importance of  reporting pain   - Provide non-pharmacologic/complimentary pain relief interventions  Outcome: Progressing     Problem: INFECTION - ADULT  Goal: Absence or prevention of progression during hospitalization  Description: INTERVENTIONS:  - Assess and monitor for signs and symptoms of infection  - Monitor lab/diagnostic results  - Monitor all insertion sites, i.e. indwelling lines, tubes, and drains  - Monitor endotracheal if appropriate and nasal secretions for changes in amount and color  - [place] appropriate cooling/warming therapies per order  - Administer medications as ordered  - Instruct and encourage patient and family to use good hand hygiene technique  - Identify and instruct in appropriate isolation precautions for identified infection/condition  Outcome: Progressing  Goal: Absence of fever/infection during neutropenic period  Description: INTERVENTIONS:  - Monitor WBC  - Perform strict hand hygiene  - Limit to healthy visitors only  - No plants, dried, fresh or silk flowers with stevenson in patient room  Outcome: Progressing     Problem: SAFETY ADULT  Goal: Patient will remain free of falls  Description: INTERVENTIONS:  - Educate patient/family on patient safety including physical limitations  - Instruct patient to call for assistance with activity   -  Consider consulting OT/PT to assist with strengthening/mobility based on AM PAC & JH-HLM score  - Consult OT/PT to assist with strengthening/mobility   - Keep Call bell within reach  - Keep bed low and locked with side rails adjusted as appropriate  - Keep care items and personal belongings within reach  - Initiate and maintain comfort rounds  - Make Fall Risk Sign visible to staff  - Offer Toileting every 3 Hours, in advance of need  - Initiate/Maintain bed alarm  - Obtain necessary fall risk management equipment:   - Apply yellow socks and bracelet for high fall risk patients  - Consider moving patient to room near nurses station  Outcome: Progressing  Goal: Maintain or return to baseline ADL function  Description: INTERVENTIONS:  -  Assess patient's ability to carry out ADLs; assess patient's baseline for ADL function and identify physical deficits which impact ability to perform ADLs (bathing, care of mouth/teeth, toileting, grooming, dressing, etc.)  - Assess/evaluate cause of self-care deficits   - Assess range of motion  - Assess patient's mobility; develop plan if impaired  - Assess patient's need for assistive devices and provide as appropriate  - Encourage maximum independence but intervene and supervise when necessary  - Involve family in performance of ADLs  - Assess for home care needs following discharge   - Consider OT consult to assist with ADL evaluation and planning for discharge  - Provide patient education as appropriate  - Monitor functional capacity and physical performance, use of AM PAC & JH-HLM   - Monitor gait, balance and fatigue with ambulation    Outcome: Progressing  Goal: Maintains/Returns to pre admission functional level  Description: INTERVENTIONS:  - Perform AM-PAC 6 Click Basic Mobility/ Daily Activity assessment daily.  - Set and communicate daily mobility goal to care team and patient/family/caregiver.   - Collaborate with rehabilitation services on mobility goals if  consulted  - Perform Range of Motion 3 times a day.  - Reposition patient every 3 hours.  - Dangle patient 3 times a day  - Stand patient 3 times a day  - Ambulate patient 3 times a day  - Out of bed to chair 3 times a day   - Out of bed for meals 3 times a day  - Out of bed for toileting  - Record patient progress and toleration of activity level   Outcome: Progressing     Problem: DISCHARGE PLANNING  Goal: Discharge to home or other facility with appropriate resources  Description: INTERVENTIONS:  - Identify barriers to discharge w/patient and caregiver  - Arrange for needed discharge resources and transportation as appropriate  - Identify discharge learning needs (meds, wound care, etc.)  - Arrange for interpretive services to assist at discharge as needed  - Refer to Case Management Department for coordinating discharge planning if the patient needs post-hospital services based on physician/advanced practitioner order or complex needs related to functional status, cognitive ability, or social support system  Outcome: Progressing     Problem: Knowledge Deficit  Goal: Patient/family/caregiver demonstrates understanding of disease process, treatment plan, medications, and discharge instructions  Description: Complete learning assessment and assess knowledge base.  Interventions:  - Provide teaching at level of understanding  - Provide teaching via preferred learning methods  Outcome: Progressing     Problem: HEMATOLOGIC - ADULT  Goal: Maintains hematologic stability  Description: INTERVENTIONS  - Assess for signs and symptoms of bleeding or hemorrhage  - Monitor labs  - Administer supportive blood products/factors as ordered and appropriate  Outcome: Progressing

## 2025-05-16 NOTE — PLAN OF CARE
Problem: PAIN - ADULT  Goal: Verbalizes/displays adequate comfort level or baseline comfort level  Description: Interventions:  - Encourage patient to monitor pain and request assistance  - Assess pain using appropriate pain scale  - Administer analgesics as ordered based on type and severity of pain and evaluate response  - Implement non-pharmacological measures as appropriate and evaluate response  - Consider cultural and social influences on pain and pain management  - Notify physician/advanced practitioner if interventions unsuccessful or patient reports new pain  - Educate patient/family on pain management process including their role and importance of  reporting pain   - Provide non-pharmacologic/complimentary pain relief interventions  5/16/2025 1807 by Negrita Zapata  Outcome: Adequate for Discharge  5/16/2025 1050 by Negrita Zapata  Outcome: Progressing     Problem: INFECTION - ADULT  Goal: Absence or prevention of progression during hospitalization  Description: INTERVENTIONS:  - Assess and monitor for signs and symptoms of infection  - Monitor lab/diagnostic results  - Monitor all insertion sites, i.e. indwelling lines, tubes, and drains  - Monitor endotracheal if appropriate and nasal secretions for changes in amount and color  - [place] appropriate cooling/warming therapies per order  - Administer medications as ordered  - Instruct and encourage patient and family to use good hand hygiene technique  - Identify and instruct in appropriate isolation precautions for identified infection/condition  5/16/2025 1807 by Negrita Zapata  Outcome: Adequate for Discharge  5/16/2025 1050 by Negrita Zapata  Outcome: Progressing  Goal: Absence of fever/infection during neutropenic period  Description: INTERVENTIONS:  - Monitor WBC  - Perform strict hand hygiene  - Limit to healthy visitors only  - No plants, dried, fresh or silk flowers with stevenson in patient room  5/16/2025 1807 by Negrita  Jodi  Outcome: Adequate for Discharge  5/16/2025 1050 by Negrita Zapata  Outcome: Progressing     Problem: SAFETY ADULT  Goal: Patient will remain free of falls  Description: INTERVENTIONS:  - Educate patient/family on patient safety including physical limitations  - Instruct patient to call for assistance with activity   - Consider consulting OT/PT to assist with strengthening/mobility based on AM PAC & JH-HLM score  - Consult OT/PT to assist with strengthening/mobility   - Keep Call bell within reach  - Keep bed low and locked with side rails adjusted as appropriate  - Keep care items and personal belongings within reach  - Initiate and maintain comfort rounds  - Make Fall Risk Sign visible to staff  - Offer Toileting every  Hours, in advance of need  - Initiate/Maintain alarm  - Obtain necessary fall risk management equipment:   - Apply yellow socks and bracelet for high fall risk patients  - Consider moving patient to room near nurses station  5/16/2025 1807 by Negrita Zapata  Outcome: Adequate for Discharge  5/16/2025 1050 by Negrita Zapata  Outcome: Progressing  Goal: Maintain or return to baseline ADL function  Description: INTERVENTIONS:  -  Assess patient's ability to carry out ADLs; assess patient's baseline for ADL function and identify physical deficits which impact ability to perform ADLs (bathing, care of mouth/teeth, toileting, grooming, dressing, etc.)  - Assess/evaluate cause of self-care deficits   - Assess range of motion  - Assess patient's mobility; develop plan if impaired  - Assess patient's need for assistive devices and provide as appropriate  - Encourage maximum independence but intervene and supervise when necessary  - Involve family in performance of ADLs  - Assess for home care needs following discharge   - Consider OT consult to assist with ADL evaluation and planning for discharge  - Provide patient education as appropriate  - Monitor functional capacity and physical  performance, use of AM PAC & -HLM   - Monitor gait, balance and fatigue with ambulation    5/16/2025 1807 by Negrita Zapata  Outcome: Adequate for Discharge  5/16/2025 1050 by Negrita Zapata  Outcome: Progressing  Goal: Maintains/Returns to pre admission functional level  Description: INTERVENTIONS:  - Perform AM-PAC 6 Click Basic Mobility/ Daily Activity assessment daily.  - Set and communicate daily mobility goal to care team and patient/family/caregiver.   - Collaborate with rehabilitation services on mobility goals if consulted  - Perform Range of Motion  times a day.  - Reposition patient every  hours.  - Dangle patient  times a day  - Stand patient  times a day  - Ambulate patient times a day  - Out of bed to chair  times a day   - Out of bed for meals  times a day  - Out of bed for toileting  - Record patient progress and toleration of activity level   5/16/2025 1807 by Negrita Zapata  Outcome: Adequate for Discharge  5/16/2025 1050 by Negrita Zapata  Outcome: Progressing     Problem: DISCHARGE PLANNING  Goal: Discharge to home or other facility with appropriate resources  Description: INTERVENTIONS:  - Identify barriers to discharge w/patient and caregiver  - Arrange for needed discharge resources and transportation as appropriate  - Identify discharge learning needs (meds, wound care, etc.)  - Arrange for interpretive services to assist at discharge as needed  - Refer to Case Management Department for coordinating discharge planning if the patient needs post-hospital services based on physician/advanced practitioner order or complex needs related to functional status, cognitive ability, or social support system  5/16/2025 1807 by Negrita Zapata  Outcome: Adequate for Discharge  5/16/2025 1050 by Negrita Zapata  Outcome: Progressing     Problem: Knowledge Deficit  Goal: Patient/family/caregiver demonstrates understanding of disease process, treatment plan, medications, and discharge  instructions  Description: Complete learning assessment and assess knowledge base.  Interventions:  - Provide teaching at level of understanding  - Provide teaching via preferred learning methods  5/16/2025 1807 by Negrita Zapata  Outcome: Adequate for Discharge  5/16/2025 1050 by Negrita Zapata  Outcome: Progressing     Problem: HEMATOLOGIC - ADULT  Goal: Maintains hematologic stability  Description: INTERVENTIONS  - Assess for signs and symptoms of bleeding or hemorrhage  - Monitor labs  - Administer supportive blood products/factors as ordered and appropriate  5/16/2025 1807 by Negrita Zapata  Outcome: Adequate for Discharge  5/16/2025 1050 by Negrita Zapata  Outcome: Progressing

## 2025-05-16 NOTE — DISCHARGE SUMMARY
Discharge Summary - Hospitalist   Name: Debby Barrios 79 y.o. 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  Patient was seen by IR for outpatient lung mass biopsy however routine labs noted with pancytopenia.  Patient reports fatigue, weakness, night sweats, easy bruising, and bone pain in arms and legs since 11/24    CBC: WBC of 3.35, hemoglobin 10.6, platelet 53,000 at admission  CBC 5/15 WBC of 3.59, hemoglobin 8.8, platelet 86,000  Prior labs from 09/2024 were within normal limits.    Currently patient is afebrile, hemodynamically stable.  Acutely nontoxic-appearing.  Heme-onc following  Iron, folic acid, B12, LDH, are WNL   Received 1 platelet transfusion last night, platelet count stable at 133,000  IR Bone marrow biopsy performed 5/15, results pending  Trend CBC.  Stable for discharge, has appointment scheduled with outpatient Oncology  Lung mass  Recent outpatient PET scan noted finding concerning of primary pulmonary neoplasm.  IR, heme-onc following.  IR Biopsy performed 05/15, results pending  Follow-up outpatient with Oncology appointment scheduled   Thyroid nodule  Recently had outpatient thyroid nodule biopsy and pathology report noted with atypia of undetermined significance.  PET scan negative for uptake.   Patient was recommended to surgical oncology.   Headache, new daily persistent ([facility])  Patient reports new headache since 11/24  No vision changes, focal deficits  CT Head w/o contrast: No acute intracranial abnormality. No acute intracranial hemorrhage, as clinically questioned. Mild chronic microangiopathy.  MRI Brain: No acute intracranial pathology or evidence of intracranial metastatic disease. Chronic  microangiopathy.     Medical Problems       Resolved Problems  Date Reviewed: 4/15/2025   None       Discharging Physician / Practitioner: RANDALL Doll  PCP: Eleazar Romo MD  Admission Date:   Admission Orders (From admission, onward)       Ordered        05/14/25 1331  Inpatient Admission  Once                          Discharge Date: 05/16/25    Consultations During Hospital Stay:  Interventional Radiology  Medical Oncology    Procedures Performed:   Lung Biopsy    Significant Findings / Test Results:   MRI brain w wo contrast   Final Result by E. Alec Schoenberger, MD (05/16 1526)      No acute intracranial pathology or evidence of intracranial metastatic disease.   Chronic microangiopathy.      Workstation performed: [id number]         XR chest portable   Final Result by Chente Keith MD (05/16 1135)      Decreased right hematoma.      Small hemothorax.      Resolved right extrathoracic soft tissues.            Workstation performed: [id number]         XR chest portable   Final Result by Chente Keith MD (05/15 1654)      Decreased appearance right hematoma.      Question small lower right extrathoracic hematoma.            Workstation performed: [id number]         XR chest portable   Final Result by Chente Keith MD (05/15 1637)      Peripheral posterior right base hematoma.      Findings concur with the preliminary report by the referring clinician already in PACS and/or our electronic record EPIC.            Workstation performed: [id number]         CT head wo contrast   Final Result by Eleazar Perales MD (05/14 1451)      No acute intracranial abnormality. No acute intracranial hemorrhage, as clinically questioned.      Mild chronic microangiopathy.                  Workstation performed: [id number]         IR biopsy lung    (Results Pending)   IR biopsy bone    (Results Pending)         Incidental Findings:   As noted above  I reviewed the above mentioned incidental  "findings with the patient and/or family and they expressed understanding.    Test Results Pending at Discharge (will require follow up):   Lung Biopsy  Leukemia work-up     Outpatient Tests Requested:  None    Complications:  None    Reason for Admission: No chief complaint on file.        Hospital Course:   Debby Barrios is a 79 y.o. female patient who originally presented to the hospital on 5/14/2025 due to pancytopenia.  Patient was seen by IR outpatient and sent for inpatient admission due to noted pancytopenia with WBCs of 3.5, hemoglobin of 10.6 and platelets of 53K.  Recent labs prior to this were within normal limits.  Interventional radiology and medical oncology were consulted during this admission.  Patient had labs to rule out leukemia, some labs are pending.  Will follow-up with medical oncology this week for results.  She had a lung biopsy on 5/15.  Bleeding was noted into the pleural space on initial follow-up. She received a unit of platelets on 5/15.  Her platelets remained stabled Hematoma was monitored with repeat chest x-rays x 2 which showed stability.  Patient was stable on discharge without complaints to offer.  She verbalized understanding of all discharge and follow-up instructions.  All questions answered to patient's satisfaction.         Please see above list of diagnoses and related plan for additional information.     Condition at Discharge: good    Discharge Day Visit / Exam:   Subjective:   Patient seen and examined sitting up in bed with no complaints to offer eager for discharge home.  Understanding of all discharge and follow-up instructions  Vitals: Blood Pressure: 120/65 (05/16/25 1557)  Pulse: 84 (05/16/25 1557)  Temperature: 98.5 °F (36.9 °C) (05/16/25 1557)  Temp Source: Oral (05/16/25 1557)  Respirations: 16 (05/16/25 1557)  Height: 5' 2\" (157.5 cm) (05/14/25 1300)  Weight - Scale: 60.3 kg (132 lb 15 oz) (05/14/25 1300)  SpO2: 96 % (05/16/25 1557)  Physical Exam  Vitals and " nursing note reviewed.   Constitutional:       General: She is not in acute distress.     Appearance: She is well-developed.   HENT:      Head: Normocephalic and atraumatic.      Mouth/Throat:      Mouth: Mucous membranes are moist.     Eyes:      Conjunctiva/sclera: Conjunctivae normal.       Cardiovascular:      Rate and Rhythm: Normal rate and regular rhythm.      Pulses: Normal pulses.      Heart sounds: No murmur heard.  Pulmonary:      Effort: Pulmonary effort is normal. No respiratory distress.      Breath sounds: Normal breath sounds.   Abdominal:      Palpations: Abdomen is soft.      Tenderness: There is no abdominal tenderness.     Musculoskeletal:         General: No swelling.      Cervical back: Neck supple.     Skin:     General: Skin is warm and dry.      Capillary Refill: Capillary refill takes less than 2 seconds.     Neurological:      Mental Status: She is alert and oriented to person, place, and time.      Motor: No weakness.     Psychiatric:         Mood and Affect: Mood normal.         Behavior: Behavior normal.          Discussion with Family: Updated  (daughter) at bedside.    Discharge instructions/Information to patient and family:   See after visit summary for information provided to patient and family.      Provisions for Follow-Up Care:  See after visit summary for information related to follow-up care and any pertinent home health orders.      Mobility at time of Discharge:   Basic Mobility Inpatient Raw Score: 24  JH-HLM Goal: 8: Walk 250 feet or more  JH-HLM Achieved: 7: Walk 25 feet or more  HLM Goal achieved. Continue to encourage appropriate mobility.     Disposition:   Home    Planned Readmission: No    Discharge Medications:  See after visit summary for reconciled discharge medications provided to patient and/or family.      Administrative Statements   Discharge Statement:  I have spent a total time of 38 minutes in caring for this patient on the day of the  visit/encounter. >30 minutes of time was spent on: Diagnostic results, Prognosis, Risks and benefits of tx options, Instructions for management, Patient and family education, Importance of tx compliance, Risk factor reductions, Impressions, Counseling / Coordination of care, Documenting in the medical record, Reviewing / ordering tests, medicine, procedures  , and Communicating with other healthcare professionals .    **Please Note: This note may have been constructed using a voice recognition system**

## 2025-05-16 NOTE — DISCHARGE INSTR - AVS FIRST PAGE
Dear Debby Barrios,     [facility] For follow up as well as any medication refills, we recommend that you follow up with your primary care physician. Here are the most important instructions/ recommendations at discharge:     Notable Medication Adjustments -   None  Testing Required after Discharge - ** Please contact your PCP to request testing orders for any of the testing recommended here **  None  Important follow up information -   Follow-up as scheduled with Oncology, information provided below  Make an appointment to see your PCP within 1 week of discharge  Other Instructions -   Follow-up outpatient for your pathology results  Use soft bristle tooth brush, avoid sharp objects, to reduce bleeding risk  Avoid cluttered environments, wear nonskid shoes to prevent falls  Please review this entire after visit summary as additional general instructions including medication list, appointments, activity, diet, any pertinent wound care, and other additional recommendations from your care team that may be provided for you.      Sincerely,     RANDALL Doll

## 2025-05-16 NOTE — PROGRESS NOTES
"Progress Note -Interventional Radiology NP  Debby Barrios 79 y.o. female MRN: [id number]  Unit/Bed#: -01 Encounter: [id number]    Assessment/Plan:    Debby Barrios is a 79 y.o. female who presented for an outpatient right lung nodule biopsy and bone marrow biopsy.    Patient is s/p right lung nodule biopsy yesterday.  Patient had some bleeding into the pleural space on initial follow-up CT scan s/p biopsy.  Slight enlargement was seen 10 min s/p biopsy.    Patient was given Platelet infusion.  Patient was monitored for bleeding overnight.    Repeat CXR this morning showed -   Decreased right hematoma.   Small hemothorax.   Resolved right extrathoracic soft tissues.    Patient reports no chest pain or shortness of breath.    Per Oncology, patient is for MRI brain - pending for headache and r/o malignancy.    IR to sign-off. Rest of care per Primary and Oncology.    Please feel free to reach out to IR with any questions or concerns.      Subjective:  Patient sitting up in bedside chair awake and alert. Patient has not reports of chest pain or shortness of breath. Patient reports that she ate a full lunch and has no abdominal pain, N/V.      Problem List[1]       Objective:    Vitals:  /65   Pulse 84   Temp 98.5 °F (36.9 °C) (Oral)   Resp 16   Ht 5' 2\" (1.575 m)   Wt 60.3 kg (132 lb 15 oz)   SpO2 96%   BMI 24.31 kg/m²   Body mass index is 24.31 kg/m².  Weight (last 2 days)       Date/Time Weight    05/14/25 1300 60.3 (132.94)            I/Os:    Intake/Output Summary (Last 24 hours) at 5/16/2025 1612  Last data filed at 5/16/2025 0728  Gross per 24 hour   Intake 320 ml   Output --   Net 320 ml       Invasive Devices       Peripheral Intravenous Line  Duration             Peripheral IV 05/15/25 Dorsal (posterior);Right Forearm 1 day                    Physical Exam:  Physical Exam  HENT:      Head: Normocephalic.      Mouth/Throat:      Mouth: Mucous membranes are moist.     Cardiovascular:      Rate " "and Rhythm: Normal rate.      Pulses: Normal pulses.   Pulmonary:      Effort: Pulmonary effort is normal.   Abdominal:      Palpations: Abdomen is soft.     Musculoskeletal:      Cervical back: Normal range of motion.     Skin:     General: Skin is warm and dry.      Capillary Refill: Capillary refill takes less than 2 seconds.     Neurological:      Mental Status: She is alert and oriented to person, place, and time.     Psychiatric:         Mood and Affect: Mood normal.         Behavior: Behavior normal.         Thought Content: Thought content normal.         Judgment: Judgment normal.                      Lab Results and Cultures:   CBC with diff:   Lab Results   Component Value Date    WBC 3.57 (L) 05/16/2025    HGB 8.6 (L) 05/16/2025    HCT 26.6 (L) 05/16/2025     (H) 05/16/2025     (L) 05/16/2025    RBC 2.50 (L) 05/16/2025    MCH 34.4 (H) 05/16/2025    MCHC 32.3 05/16/2025    RDW 17.5 (H) 05/16/2025    MPV 9.9 05/16/2025    NRBC 0 05/14/2025      BMP/CMP:  Lab Results   Component Value Date    K 3.8 05/16/2025    K 4.4 08/30/2021     05/16/2025     08/30/2021    CO2 26 05/16/2025    CO2 28 08/30/2021    BUN 8 05/16/2025    BUN 9 08/30/2021    CREATININE 0.48 (L) 05/16/2025    CREATININE 0.75 08/30/2021    CALCIUM 8.5 05/16/2025    CALCIUM 9.1 08/30/2021    AST 14 05/14/2025    AST 9 08/30/2021    ALT 9 05/14/2025    ALT 16 08/30/2021    ALKPHOS 75 05/14/2025    ALKPHOS 109 08/30/2021    EGFR 94 05/16/2025    EGFR 88 07/17/2020   ,     Coags:   Lab Results   Component Value Date    INR 0.99 05/14/2025   ,   Results from last 7 days   Lab Units 05/14/25  1520 05/14/25  0937   INR  0.99 0.98        Lipid Panel: No results found for: \"CHOL\"  Lab Results   Component Value Date    HDL 56 08/07/2024     Lab Results   Component Value Date    HDL 56 08/07/2024     Lab Results   Component Value Date    LDLCALC 85 08/07/2024     Lab Results   Component Value Date    TRIG 151 (H) 08/07/2024 " "      HgbA1c:   Lab Results   Component Value Date    HGBA1C 5.2 08/30/2021    HGBA1C 5.2 08/30/2021       Blood Culture: No results found for: \"BLOODCX\",   Urinalysis: No results found for: \"COLORU\", \"CLARITYU\", \"SPECGRAV\", \"PHUR\", \"LEUKOCYTESUR\", \"NITRITE\", \"PROTEINUA\", \"GLUCOSEU\", \"KETONESU\", \"BILIRUBINUR\", \"BLOODU\",   Urine Culture: No results found for: \"URINECX\",   Wound Culure:  No results found for: \"WOUNDCULT\"    VTE Pharmacologic Prophylaxis: Sequential compression device (Venodyne)     Counseling / Coordination of Care  Total floor / unit time spent today 40 minutes.  Greater than 50% of total time was spent face to face with the patient and / or family counseling and / or coordination of care.  A description of the counseling / coordination of care.        Thank you for allowing me to participate in the care of Debby Barrios. Please don't hesitate to call, text, email, or Secure Chat with any questions.     This text is generated with voice recognition software. There may be translation, syntax,  or grammatical errors. If you have any questions, please contact the dictating provider.         [1]   Patient Active Problem List  Diagnosis    Family history of breast cancer    Psoriasis    Dyslipidemia    Myalgia    Osteoporosis    Bilateral low back pain without sciatica    Neuropathy    Lung mass    Thyroid nodule    Pancytopenia (HCC)    Headache, new daily persistent ([facility])     "

## 2025-05-16 NOTE — TELEPHONE ENCOUNTER
New Patient Intake Form   Patient Details:    Debby Barrios  1945    Appointment Information   Who is calling to schedule? Deanna Alfred   If not self, what is the caller's name?   NA   DID YOU CONFIRM INSURANCE WITH PATIENT? E verified, Routed to finance   Referring provider Ban Devlin PA-C   What is the diagnosis? Pancytopenia, Lung mass, Thyroid nodule, Headache, new daily persistent      Is there a confirmed tissue diagnosis?   Lung bx and BMBX taken on 5/15     Is there a biopsy ordered or pending?  Please specify dates  If yes, route to NN/OCC   See above     Is patient aware of diagnosis?   Yes     Have you had any imaging or labs done?  If yes, where?  (If imaging done outside of [facility], please remind patient to bring a disk.) Yes-H     If imaging done at outside facility, did you instruct patient to obtain discs and bring to visit? NA   Have you been seen by another Oncologist/Hematologist?  If so, who and where? No   Are the records in [facility] or Care Everywhere? Yes   Does the patient have records at another facility/hospital?    If yes, Name of facility, city and state where facility is located. [facility]     Did you instruct patient to have records faxed to rightfax and provide rightfax number?   Care Everywhere   Preferred [place]   Is the patient willing to be seen by another provider?  (This is for breast patients only) NA     Did you send new patient paperwork?  Email or mail? NA   Miscellaneous Information: The patient is scheduled for her HFU appointment with Ban Devlin PA-C on 5/21 at 1400 in the [place] office.  Appt is Okd by Ban.

## 2025-05-16 NOTE — ASSESSMENT & PLAN NOTE
Patient reports new headache since 11/24  No vision changes, focal deficits  CT Head w/o contrast: No acute intracranial abnormality. No acute intracranial hemorrhage, as clinically questioned. Mild chronic microangiopathy.  MRI Brain: No acute intracranial pathology or evidence of intracranial metastatic disease. Chronic microangiopathy.

## 2025-05-17 LAB
KAPPA LC FREE SER-MCNC: 31.7 MG/L (ref 3.3–19.4)
KAPPA LC FREE/LAMBDA FREE SER: 0.92 {RATIO} (ref 0.26–1.65)
LAMBDA LC FREE SERPL-MCNC: 34.3 MG/L (ref 5.7–26.3)

## 2025-05-19 LAB
ABO GROUP BLD BPU: NORMAL
BPU ID: NORMAL
SCAN RESULT: NORMAL
UNIT DISPENSE STATUS: NORMAL
UNIT PRODUCT CODE: NORMAL
UNIT PRODUCT VOLUME: 238 ML
UNIT PRODUCT VOLUME: 243 ML
UNIT PRODUCT VOLUME: 300 ML
UNIT RH: NORMAL

## 2025-05-19 NOTE — UTILIZATION REVIEW
NOTIFICATION OF ADMISSION DISCHARGE   This is a Notification of Discharge from [facility]. Please be advised that this patient has been discharge from our facility. Below you will find the admission and discharge date and time including the patient’s disposition.   UTILIZATION REVIEW CONTACT:  Utilization Review Assistants  Network Utilization Review Department  Phone: [phone] x carefully listen to the prompts. All voicemails are confidential.  Email: [email or web address]     ADMISSION INFORMATION  PRESENTATION DATE: 5/14/2025  1:23 PM  OBERVATION ADMISSION DATE: N/A  INPATIENT ADMISSION DATE: 5/14/25  1:23 PM   DISCHARGE DATE: 5/16/2025  6:08 PM   DISPOSITION:Home/Self Care    Network Utilization Review Department  ATTENTION: Please call with any questions or concerns to [phone] and carefully listen to the prompts so that you are directed to the right person. All voicemails are confidential.   For Discharge needs, contact Care Management DC Support Team at [phone] opt. 2  Send all requests for admission clinical reviews, approved or denied determinations and any other requests to dedicated fax number below belonging to the campus where the patient is receiving treatment. List of dedicated fax numbers for the Facilities:  FACILITY NAME UR FAX NUMBER   ADMISSION DENIALS (Administrative/Medical Necessity) [phone]   DISCHARGE SUPPORT TEAM ([facility]) [phone]   PARENT CHILD HEALTH (Maternity/NICU/Pediatrics) [phone]   [facility] [phone]   [facility] [phone]   [facility] [phone]   General acute hospital [phone]   Critical access hospital [phone]   [facility] [phone]   [facility] [phone]   [facility] [phone]   [facility]  [facility] [phone]   [facility] [phone]   [facility] [phone]   [facility] [phone]

## 2025-05-19 NOTE — BRIEF OP NOTE (RAD/CATH)
INTERVENTIONAL RADIOLOGY PROCEDURE NOTE    Date: 5/19/2025    Procedure:   Procedure Summary       Date:  Room / Location:     Anesthesia Start:  Anesthesia Stop:     Procedure:  Diagnosis:     Scheduled Providers:  Responsible Provider:     Anesthesia Type: Not recorded ASA Status: Not recorded            Preoperative diagnosis:   1. Pancytopenia (HCC)         Postoperative diagnosis: Same.    Surgeon: Ken Hart DO     Assistant: None. No qualified resident was available.    Blood loss: ***    Specimens: ***     Findings: ***    Complications: None immediate.    Anesthesia: {Anesthesia type:[id number]}

## 2025-05-20 LAB
SCAN RESULT: NORMAL

## 2025-05-21 ENCOUNTER — APPOINTMENT (OUTPATIENT)
Dept: LAB | Facility: HOSPITAL | Age: 80
End: 2025-05-21

## 2025-05-21 ENCOUNTER — OFFICE VISIT (OUTPATIENT)
Dept: HEMATOLOGY ONCOLOGY | Facility: CLINIC | Age: 80
End: 2025-05-21
Payer: COMMERCIAL

## 2025-05-21 VITALS
SYSTOLIC BLOOD PRESSURE: 132 MMHG | RESPIRATION RATE: 17 BRPM | WEIGHT: 133 LBS | OXYGEN SATURATION: 100 % | HEIGHT: 62 IN | BODY MASS INDEX: 24.48 KG/M2 | DIASTOLIC BLOOD PRESSURE: 84 MMHG | TEMPERATURE: 97.9 F | HEART RATE: 71 BPM

## 2025-05-21 DIAGNOSIS — R91.8 LUNG MASS: ICD-10-CM

## 2025-05-21 DIAGNOSIS — R19.7 WATERY DIARRHEA: Primary | ICD-10-CM

## 2025-05-21 DIAGNOSIS — D47.2 MONOCLONAL GAMMOPATHY: ICD-10-CM

## 2025-05-21 DIAGNOSIS — E04.1 THYROID NODULE: ICD-10-CM

## 2025-05-21 DIAGNOSIS — D61.818 PANCYTOPENIA (HCC): ICD-10-CM

## 2025-05-21 DIAGNOSIS — G44.52 NEW DAILY PERSISTENT HEADACHE: ICD-10-CM

## 2025-05-21 PROCEDURE — 99214 OFFICE O/P EST MOD 30 MIN: CPT | Performed by: PHYSICIAN ASSISTANT

## 2025-05-21 NOTE — PROGRESS NOTES
Name: Debby Barrios      : 1945      MRN: [id number]  Encounter Provider: Ban Devlin PA-C  Encounter Date: 2025   Encounter department: [facility]AT[facility] [place]  :  Assessment & Plan  Watery diarrhea  Watery diarrhea x 1 week. Multiple BM per day   Check for C diff given recent hospitalization. Will obtain additional stool studies r/o infectious source   Encouraged hydration   Orders:    Clostridioides difficile toxin by PCR with EIA; Future    Stool culture; Future    Stool Enteric Bacterial Panel by PCR; Future    Pancytopenia (HCC)  Unclear etiology. Differential remains large including hematologic malignancy, PCN, hemolysis, PNH, DIC, nutritional deficiency, metastatic disease, and others. Unlikely drug side effect as only new medication was Fosamax in past 6 months.   Abnormal SPEP as below   S/p left iliac bone marrow biopsy 05/15 by IR   Spoke to path, suspicious for involvement by a plasma cell neoplasm. Clonality testing is pending. MPN work up pending as well.   Awaiting final path        Lung mass  Approximately 3.8 cm lung mass in right lower lobe. SUV 5.9 on PET scan.  Has a few left lung nodules with solid component however no significant FDG activity although may be below threshold for FDG activity given small size 8mm.   S/p lung biopsy 05/15 BY IR (18g cores x3 in formalin, x1 in flow), complicated by bleeding into pleural space   Malignant vs infectious   Pathology pending        Thyroid nodule  S/p FNA , path c/w AUS ([place] Category III)   Follow up with surgical oncology        New daily persistent headache  CT head wo ICH   Brain MRI wo acute findings. 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 sFLCR 0.92   UPEP shows non-selective proteinuria, IF identified as free lambda light chains          Assessment & Plan        No follow-ups  on file.    History of Present Illness   Chief Complaint   Patient presents with    Follow-up     History of Present Illness  Debby Barrios is a 79 y.o. female who presents with past medical history of tobacco use, osteoporosis, psoriasis, thyroid nodule s/p thyroid biopsy 04/25, lung mass who presented outpatient to  for lung biopsy and was found to have pancytopenia on routine preprocedure labs.     After review of chart and discussion with patient, she reports cold symptoms approximately 3 to 3 months ago and was found to have influenza A around this time.  She had chest x-ray during evaluation for persistent cough which showed increased density in right middle lobe with a 18 mm cavitation.  Follow-up CT scan was performed which revealed persistent cavitary mass with spiculated margins in the lateral aspect of the right lower lobe approximately 3.8 cm in dimension concerning for malignancy.  There was also groundglass and solid nodule in the left lateral lung apex including and 8 mm solid component.  She was found to have a 2.9 cm right thyroid nodule on CAT scan as well.  PET scan was performed which again demonstrated the right lower lobe mass with SUV max 5.9.  No significant FDG activity in the left upper lobe nodules, mediastinal lymph nodes, abdomen, pelvis, or spine.  The thyroid nodule was again observed however did not demonstrate any FDG activity.     For the past few months she has been experiencing unintentional weight loss approximately 10 pounds total, worsening fatigue, and daily headaches that began approximately 1 month ago.  Headache is waking her up from sleep.  She had some intermittent diarrhea recently.  Denies chest pain, shortness breath, nausea vomiting, abdominal pain.  Has had back pain for 6 months or more.  No known liver or kidney disease.  Despite her fatigue she is still able to perform her ADLs, house chores, cooking etc. No recent travel.      Former smoker.  Smoked  "approximately 3 to 4 cigarettes/day for 60 years. Quit 5 years ago.  Consumes 1-2 beers per day.  No drug use.      No history of malignancy.  Her half-sister had breast cancer.  No other family history of cancer.     She is not up-to-date on mammogram or colonoscopy.     Pertinent Medical History     05/21/25: Watery diarrhea x 1 week. Multiple BM per day. No fever, hematochezia or abdominal pain.      Review of Systems        Objective   /84 (BP Location: Left arm, Patient Position: Sitting, Cuff Size: Adult)   Pulse 71   Temp 97.9 °F (36.6 °C) (Temporal)   Resp 17   Ht 5' 2\" (1.575 m)   Wt 60.3 kg (133 lb)   SpO2 100%   BMI 24.33 kg/m²     Physical Exam  Vitals reviewed.   HENT:      Head: Normocephalic.     Cardiovascular:      Rate and Rhythm: Normal rate and regular rhythm.   Pulmonary:      Effort: Pulmonary effort is normal.      Breath sounds: Normal breath sounds.   Abdominal:      Palpations: Abdomen is soft.      Tenderness: There is no abdominal tenderness.     Musculoskeletal:      Cervical back: Neck supple.   Lymphadenopathy:      Cervical: No cervical adenopathy.      Upper Body:      Right upper body: No supraclavicular or axillary adenopathy.      Left upper body: No supraclavicular or axillary adenopathy.     Skin:     Findings: No rash.     Neurological:      Mental Status: She is alert.       Physical Exam      Results    Labs: I have reviewed the following labs:  Results for orders placed or performed during the hospital encounter of 05/14/25   Comprehensive metabolic panel   Result Value Ref Range    Sodium 140 135 - 147 mmol/L    Potassium 4.2 3.5 - 5.3 mmol/L    Chloride 108 96 - 108 mmol/L    CO2 28 21 - 32 mmol/L    ANION GAP 4 4 - 13 mmol/L    BUN 11 5 - 25 mg/dL    Creatinine 0.66 0.60 - 1.30 mg/dL    Glucose 99 65 - 140 mg/dL    Calcium 8.6 8.4 - 10.2 mg/dL    AST 14 13 - 39 U/L    ALT 9 7 - 52 U/L    Alkaline Phosphatase 75 34 - 104 U/L    Total Protein 7.4 6.4 - 8.4 g/dL "    Albumin 4.0 3.5 - 5.0 g/dL    Total Bilirubin 0.60 0.20 - 1.00 mg/dL    eGFR 85 ml/min/1.73sq m   Result Value Ref Range    Hemolysis Smear No Schistocytes or Helmet Cells noted    Result Value Ref Range    Haptoglobin 129 42 - 346 mg/dL   Lactate dehydrogenase   Result Value Ref Range     140 - 271 U/L   PNH profile, WBC   Result Value Ref Range    Scan Result SEE WRITTEN REPORT    Vitamin B12   Result Value Ref Range    Vitamin B-12 273 180 - 914 pg/mL   Result Value Ref Range    Folate 15.9 >5.9 ng/mL   Retic Count   Result Value Ref Range    Retic Ct Abs 38,000 14,097 - 95,744    Retic Ct Pct 1.33 0.37 - 1.87 %   Leukemia/Lymphoma flow cytometry   Result Value Ref Range    Scan Result SEE WRITTEN REPORT    Peripheral Smear   Result Value Ref Range    Total Counted 100     Segmented % 47 %    Bands % 2 0 - 8 %    Lymphocytes % 41 %    Monocytes % 9 4 - 12 %    Eosinophils % 1 0 - 6 %    Platelet Estimate Decreased (A) Adequate    Pathology Review Yes (A) No   Protime-INR   Result Value Ref Range    Protime 13.8 12.3 - 15.0 seconds    INR 0.99 0.85 - 1.19   Fibrinogen   Result Value Ref Range    Fibrinogen 303 206 - 523 mg/dL   Protein electrophoresis, serum   Result Value Ref Range    A/G Ratio 1.18 1.10 - 1.80    Albumin % 54.2 48.0 - 70.0 %    Albumin 3.85 3.20 - 5.10 g/dl    Alpha-1 Globulin % 3.5 1.8 - 7.0 %    Alpha-1 Globulin 0.25 0.15 - 0.47 g/dL    Alpha-2 Globulin % 9.0 5.9 - 14.9 %    Alpha-2 Globulin 0.64 0.42 - 1.04 g/dL    Beta-1 Globulin % 5.7 4.7 - 7.7 %    Beta-1 Globulin 0.40 0.31 - 0.57 g/dL    Beta-2 Globulin % 7.2 3.1 - 7.9 %    Beta-2 Globulin 0.51 0.20 - 0.58 g/dL    Gamma Globulin % 20.4 6.9 - 22.3 %    Gamma Globulin 1.45 0.40 - 1.66 g/dL    M-Shashi 0.36 g/dL    Total Protein 7.1 6.4 - 8.4 g/dL   TIBC Panel (incl. Iron, TIBC, % Iron Saturation)   Result Value Ref Range    Iron Saturation 50 15 - 50 %    TIBC 257.6 250 - 450 ug/dL    Iron 129 50 - 212 ug/dL    Transferrin 184 (L)  203 - 362 mg/dL    UIBC 129 (L) 155 - 355 ug/dL   Result Value Ref Range    Ferritin 109 30 - 307 ng/mL   CBC and Platelet   Result Value Ref Range    WBC 3.51 (L) 4.31 - 10.16 Thousand/uL    RBC 2.89 (L) 3.81 - 5.12 Million/uL    Hemoglobin 10.0 (L) 11.5 - 15.4 g/dL    Hematocrit 30.3 (L) 34.8 - 46.1 %     (H) 82 - 98 fL    MCH 34.6 (H) 26.8 - 34.3 pg    MCHC 33.0 31.4 - 37.4 g/dL    RDW 18.2 (H) 11.6 - 15.1 %    Platelets 59 (L) 149 - 390 Thousands/uL    MPV 9.6 8.9 - 12.7 fL   CBC and Platelet   Result Value Ref Range    WBC 3.59 (L) 4.31 - 10.16 Thousand/uL    RBC 2.49 (L) 3.81 - 5.12 Million/uL    Hemoglobin 8.8 (L) 11.5 - 15.4 g/dL    Hematocrit 26.1 (L) 34.8 - 46.1 %     (H) 82 - 98 fL    MCH 35.3 (H) 26.8 - 34.3 pg    MCHC 33.7 31.4 - 37.4 g/dL    RDW 17.9 (H) 11.6 - 15.1 %    Platelets 86 (L) 149 - 390 Thousands/uL    MPV 8.9 8.9 - 12.7 fL   Peripheral Smear   Result Value Ref Range    Total Counted 100     Segmented % 37 %    Lymphocytes % 54 %    Monocytes % 8 4 - 12 %    Eosinophils % 1 0 - 6 %    Absolute Neutrophils 1.33 (L) 1.81 - 6.82 Thousand/uL    Absolute Lymphocytes 1.94 0.60 - 4.47 Thousand/uL    Absolute Monocytes 0.29 0.00 - 1.22 Thousand/uL    Absolute Eosinophils 0.04 0.00 - 0.61 Thousand/uL    Platelet Estimate Decreased (A) Adequate    Pathology Review Yes (A) No    Anisocytosis Present     Macrocytes Present    Leukemia/Lymphoma flow cytometry   Result Value Ref Range    Scan Result SEE WRITTEN REPORT FROM [facility]    Immunoglobulin free LT chains blood   Result Value Ref Range    Ig Kappa Free Light Chain 31.7 (H) 3.3 - 19.4 mg/L    Ig Lambda Free Light Chain 34.3 (H) 5.7 - 26.3 mg/L    Kappa/Lambda FluidC Ratio 0.92 0.26 - 1.65   IgG, IgA, IgM   Result Value Ref Range     (H) 66 - 433 mg/dL    IGG 1,403 635 - 1,741 mg/dL    IGM 92 45 - 281 mg/dL   Protein electrophoresis, urine   Result Value Ref Range    Total Protein, Urine 16.6 mg/dL    Albumin ELP, Urine  22.5 %    Alpha-1 Globulin, Urine % 8.5 %    Alpha-2 Globulin, Urine % 12.1 %    Beta, Urine 3.6 %    Gamma Globulin, Urine 53.3 %   CBC   Result Value Ref Range    WBC 3.57 (L) 4.31 - 10.16 Thousand/uL    RBC 2.50 (L) 3.81 - 5.12 Million/uL    Hemoglobin 8.6 (L) 11.5 - 15.4 g/dL    Hematocrit 26.6 (L) 34.8 - 46.1 %     (H) 82 - 98 fL    MCH 34.4 (H) 26.8 - 34.3 pg    MCHC 32.3 31.4 - 37.4 g/dL    RDW 17.5 (H) 11.6 - 15.1 %    Platelets 133 (L) 149 - 390 Thousands/uL    MPV 9.9 8.9 - 12.7 fL   Basic metabolic panel   Result Value Ref Range    Sodium 137 135 - 147 mmol/L    Potassium 3.8 3.5 - 5.3 mmol/L    Chloride 107 96 - 108 mmol/L    CO2 26 21 - 32 mmol/L    ANION GAP 4 4 - 13 mmol/L    BUN 8 5 - 25 mg/dL    Creatinine 0.48 (L) 0.60 - 1.30 mg/dL    Glucose 86 65 - 140 mg/dL    Calcium 8.5 8.4 - 10.2 mg/dL    eGFR 94 ml/min/1.73sq m   Type and screen   Result Value Ref Range    ABO Grouping A     Rh Factor Positive     Antibody Screen Negative     Specimen Expiration Date 20250517    [facility] Recheck - Contact Blood Bank Prior to Collection   Result Value Ref Range    ABO Grouping A     Rh Factor Positive    Prepare Leukoreduced Platelet Pheresis (1 pheresis product = 6-8 pooled units): 1 Product   Result Value Ref Range    Unit Product Code H3647U93     Unit Number [id number]     Unit ABO A     Unit RH POS     Unit Dispense Status Presumed Trans     Unit Product Volume 233 mL   Prepare Leukoreduced Platelet Pheresis (1 pheresis product = 6-8 pooled units): 2 Product   Result Value Ref Range    Unit Product Code U6844X00     Unit Number [id number]     Unit ABO O     Unit RH POS     Unit Dispense Status Return to Inv     Unit Product Volume 238 mL    Unit Product Code Z3482I46     Unit Number [id number]     Unit ABO O     Unit RH POS     Unit Dispense Status Presumed Trans     Unit Product Volume 243 mL    Unit Product Code D0486F33     Unit Number [id number]     Unit ABO O     Unit RH POS      Unit Dispense Status Return to [facility]     Unit Product Volume 300 mL   Path Slide Review   Result Value Ref Range    Case Report       Clinical Pathology Report                         Case: [id number]                                  Authorizing Provider:  Ban Devlin, Collected:           05/14/2025 1520                                     PA-C                                                                         Ordering Location:     [facility] Received:            05/14/2025 1622                                     4th Floor Med Surg Unit                                                      Pathologist:           Bekah Conklin MD                                                                  Specimen:    Arm, Left                                                                                  Path Slide       Peripheral blood smear shows pancytopenia, mild hyperchromic macrocytic anemia, moderate thrombocytopenia, mild leukopenia with neutropenia.     Evaluated by Bekah Conklin M.D.  Interpretation performed at [facility], [address] [facility] [address]     Path Interpretation, Serum Protein Electrophoresis   Result Value Ref Range    Case Report       Electrophoresis Case                              Case: [id number]                                   Authorizing Provider:  Ban Devlin, Collected:           05/14/2025 1520                                     PA-C                                                                         Ordering Location:     [facility] Received:            05/14/2025 2020                                     4th Floor Med Surg Unit                                                      Pathologist:           Bekah Conklin MD                                                                  Specimen:    Arm, Left                                                                                  SPEP Interpretation       The SPEP  shows a monoclonal peak in the gamma region. Immunofixation to be performed.       SPEP Immunofixation Interpretation       Serum immunofixation shows a monoclonal gammopathy identified as IgG-lambda.      Path Interpretation, Urine Protein Electrophoresis   Result Value Ref Range    Case Report       Electrophoresis Case                              Case: [id number]                                   Authorizing Provider:  Farrukh Cedeño,   Collected:           05/15/2025 1809                                     MD                                                                           Ordering Location:     [facility] Received:            05/15/2025 2246                                     4th Floor Med Surg Unit                                                      Pathologist:           Bekah Conklin MD                                                                  Specimen:    Urine, Clean Catch                                                                         UPEP Interpretation       The UPEP shows non-selective proteinuria with a faint band, too small to accurately quantitate; Immunofixation to be performed; given previously positive serum immunofixation.       UPEP Imunnofixation Interpretation       Urine immunofixation shows a monoclonal immunoglobulin identified as free lambda light chains.       Clinical Information

## 2025-05-21 NOTE — ASSESSMENT & PLAN NOTE
Unclear etiology. Differential remains large including hematologic malignancy, PCN, hemolysis, PNH, DIC, nutritional deficiency, metastatic disease, and others. Unlikely drug side effect as only new medication was Fosamax in past 6 months.   Abnormal SPEP as below   S/p left iliac bone marrow biopsy 05/15 by IR   Spoke to path, suspicious for involvement by a plasma cell neoplasm. Clonality testing is pending. MPN work up pending as well.   Awaiting final path

## 2025-05-21 NOTE — ASSESSMENT & PLAN NOTE
Approximately 3.8 cm lung mass in right lower lobe. SUV 5.9 on PET scan.  Has a few left lung nodules with solid component however no significant FDG activity although may be below threshold for FDG activity given small size 8mm.   S/p lung biopsy 05/15 BY IR (18g cores x3 in formalin, x1 in flow), complicated by bleeding into pleural space   Malignant vs infectious   Pathology pending

## 2025-05-22 ENCOUNTER — APPOINTMENT (OUTPATIENT)
Dept: LAB | Facility: HOSPITAL | Age: 80
End: 2025-05-22
Payer: COMMERCIAL

## 2025-05-22 DIAGNOSIS — R19.7 WATERY DIARRHEA: Primary | ICD-10-CM

## 2025-05-22 LAB — C DIFF TOX GENS STL QL NAA+PROBE: NEGATIVE

## 2025-05-22 PROCEDURE — 87505 NFCT AGENT DETECTION GI: CPT

## 2025-05-23 ENCOUNTER — RESULTS FOLLOW-UP (OUTPATIENT)
Dept: HEMATOLOGY ONCOLOGY | Facility: CLINIC | Age: 80
End: 2025-05-23

## 2025-05-23 LAB
C COLI+JEJUNI TUF STL QL NAA+PROBE: NEGATIVE
EC STX1+STX2 GENES STL QL NAA+PROBE: NEGATIVE
SALMONELLA SP SPAO STL QL NAA+PROBE: NEGATIVE
SHIGELLA SP+EIEC IPAH STL QL NAA+PROBE: NEGATIVE

## 2025-05-27 ENCOUNTER — OFFICE VISIT (OUTPATIENT)
Dept: FAMILY MEDICINE CLINIC | Facility: CLINIC | Age: 80
End: 2025-05-27
Payer: COMMERCIAL

## 2025-05-27 VITALS
DIASTOLIC BLOOD PRESSURE: 76 MMHG | BODY MASS INDEX: 24.66 KG/M2 | HEART RATE: 65 BPM | WEIGHT: 134 LBS | SYSTOLIC BLOOD PRESSURE: 118 MMHG | OXYGEN SATURATION: 99 % | HEIGHT: 62 IN | TEMPERATURE: 97.4 F

## 2025-05-27 DIAGNOSIS — E04.1 THYROID NODULE: ICD-10-CM

## 2025-05-27 DIAGNOSIS — R91.8 LUNG MASS: ICD-10-CM

## 2025-05-27 DIAGNOSIS — M81.0 OSTEOPOROSIS WITHOUT CURRENT PATHOLOGICAL FRACTURE, UNSPECIFIED OSTEOPOROSIS TYPE: ICD-10-CM

## 2025-05-27 DIAGNOSIS — D61.818 PANCYTOPENIA (HCC): Primary | ICD-10-CM

## 2025-05-27 PROCEDURE — 99495 TRANSJ CARE MGMT MOD F2F 14D: CPT | Performed by: FAMILY MEDICINE

## 2025-05-27 NOTE — PROGRESS NOTES
Transition of Care Visit:  Name: Debby Barrios      : 1945      MRN: [id number]  Encounter Provider: Eleazar Romo MD  Encounter Date: 2025   Encounter department: [facility] [address] [address] [facility]    Assessment & Plan  Pancytopenia (HCC)             Lung mass  Follow-up with oncology.       Thyroid nodule  Repeat ultrasound in 1 year.       Osteoporosis without current pathological fracture, unspecified osteoporosis type  Continue Fosamax 70 mg weekly            History of Present Illness     Transitional Care Management Review:   Debby Barrios is a 79 y.o. female here for TCM follow up.     During the TCM phone call patient stated:  TCM Call (since 2025)       Date and time call was made  2025  6:44 PM    Hospital care reviewed  Records reviewed    Patient was hospitialized at  [facility]    Date of Admission  25    Date of discharge  25    Diagnosis  Pancytopenia    Disposition  Home    Were the patients medications reviewed and updated  Yes          TCM Call (since 2025)       Post hospital issues  None    Scheduled for follow up?  Yes    Did you obtain your prescribed medications  Yes    Do you need help managing your prescriptions or medications  No    Is transportation to your appointment needed  No    I have advised the patient to call PCP with any new or worsening symptoms  Evelin Lamar    Living Arrangements  Alone    Are you recieving home care services  No          Patient comes in for hospital follow-up.  She had pancytopenia and right lung mass.  She also had thyroid nodule which was biopsied and is benign.  Other results are pending.      Review of Systems   Constitutional:  Positive for fatigue. Negative for chills and fever.   Respiratory: Negative.     Cardiovascular: Negative.    Neurological:  Positive for light-headedness.     Objective   /76 (BP Location: Left arm, Patient Position: Sitting, Cuff Size: Standard)   " Pulse 65   Temp (!) 97.4 °F (36.3 °C) (Temporal)   Ht 5' 2\" (1.575 m)   Wt 60.8 kg (134 lb)   SpO2 99%   BMI 24.51 kg/m²     Physical Exam  Constitutional:       Appearance: Normal appearance. She is well-developed.   HENT:      Head: Normocephalic and atraumatic.      Right Ear: External ear normal.      Left Ear: External ear normal.     Eyes:      Pupils: Pupils are equal, round, and reactive to light.     Neck:      Thyroid: No thyromegaly.     Cardiovascular:      Rate and Rhythm: Normal rate and regular rhythm.      Heart sounds: Normal heart sounds.   Pulmonary:      Effort: Pulmonary effort is normal.      Breath sounds: Normal breath sounds.     Musculoskeletal:         General: Normal range of motion.      Cervical back: Neck supple.   Lymphadenopathy:      Cervical: No cervical adenopathy.     Skin:     General: Skin is warm and dry.     Neurological:      Mental Status: She is alert.     Psychiatric:         Mood and Affect: Mood normal.         Behavior: Behavior normal.       Medications have been reviewed by provider in current encounter      "

## 2025-05-29 ENCOUNTER — TELEPHONE (OUTPATIENT)
Dept: HEMATOLOGY ONCOLOGY | Facility: CLINIC | Age: 80
End: 2025-05-29

## 2025-05-29 NOTE — TELEPHONE ENCOUNTER
Called and spoke with patient and reiterated communication from my chart that was not read. Pt doesn't want to take any medication for the diarrhea however it is much less that how it was last week. She is feeling better

## 2025-06-02 ENCOUNTER — TELEPHONE (OUTPATIENT)
Dept: HEMATOLOGY ONCOLOGY | Facility: CLINIC | Age: 80
End: 2025-06-02

## 2025-06-02 DIAGNOSIS — D61.818 PANCYTOPENIA (HCC): Primary | ICD-10-CM

## 2025-06-02 DIAGNOSIS — R91.8 LUNG MASS: ICD-10-CM

## 2025-06-02 PROCEDURE — 85097 BONE MARROW INTERPRETATION: CPT | Performed by: STUDENT IN AN ORGANIZED HEALTH CARE EDUCATION/TRAINING PROGRAM

## 2025-06-02 PROCEDURE — 88311 DECALCIFY TISSUE: CPT | Performed by: STUDENT IN AN ORGANIZED HEALTH CARE EDUCATION/TRAINING PROGRAM

## 2025-06-02 PROCEDURE — 88312 SPECIAL STAINS GROUP 1: CPT | Performed by: STUDENT IN AN ORGANIZED HEALTH CARE EDUCATION/TRAINING PROGRAM

## 2025-06-02 PROCEDURE — 88305 TISSUE EXAM BY PATHOLOGIST: CPT | Performed by: STUDENT IN AN ORGANIZED HEALTH CARE EDUCATION/TRAINING PROGRAM

## 2025-06-02 PROCEDURE — 88364 INSITU HYBRIDIZATION (FISH): CPT | Performed by: STUDENT IN AN ORGANIZED HEALTH CARE EDUCATION/TRAINING PROGRAM

## 2025-06-02 PROCEDURE — 88341 IMHCHEM/IMCYTCHM EA ADD ANTB: CPT | Performed by: STUDENT IN AN ORGANIZED HEALTH CARE EDUCATION/TRAINING PROGRAM

## 2025-06-02 PROCEDURE — 88360 TUMOR IMMUNOHISTOCHEM/MANUAL: CPT | Performed by: STUDENT IN AN ORGANIZED HEALTH CARE EDUCATION/TRAINING PROGRAM

## 2025-06-02 PROCEDURE — 85060 BLOOD SMEAR INTERPRETATION: CPT | Performed by: STUDENT IN AN ORGANIZED HEALTH CARE EDUCATION/TRAINING PROGRAM

## 2025-06-02 PROCEDURE — 88342 IMHCHEM/IMCYTCHM 1ST ANTB: CPT | Performed by: STUDENT IN AN ORGANIZED HEALTH CARE EDUCATION/TRAINING PROGRAM

## 2025-06-02 PROCEDURE — 88365 INSITU HYBRIDIZATION (FISH): CPT | Performed by: STUDENT IN AN ORGANIZED HEALTH CARE EDUCATION/TRAINING PROGRAM

## 2025-06-02 NOTE — TELEPHONE ENCOUNTER
Pt returned your call.  Attempted to call the office.  I got the on call provider.  She does have an appt with Dr Beckwith tomorrow if nobody get back to her today.

## 2025-06-02 NOTE — TELEPHONE ENCOUNTER
Called patient to review bone marrow biopsy and lung biopsy results. No answer, left message to call back.

## 2025-06-03 ENCOUNTER — APPOINTMENT (OUTPATIENT)
Dept: LAB | Facility: HOSPITAL | Age: 80
End: 2025-06-03
Payer: COMMERCIAL

## 2025-06-03 ENCOUNTER — DOCUMENTATION (OUTPATIENT)
Dept: HEMATOLOGY ONCOLOGY | Facility: CLINIC | Age: 80
End: 2025-06-03

## 2025-06-03 DIAGNOSIS — D61.818 PANCYTOPENIA (HCC): ICD-10-CM

## 2025-06-03 DIAGNOSIS — R91.8 LUNG MASS: ICD-10-CM

## 2025-06-03 LAB
BASOPHILS # BLD AUTO: 0.01 THOUSANDS/ÂΜL (ref 0–0.1)
BASOPHILS NFR BLD AUTO: 0 % (ref 0–1)
EOSINOPHIL # BLD AUTO: 0.03 THOUSAND/ÂΜL (ref 0–0.61)
EOSINOPHIL NFR BLD AUTO: 1 % (ref 0–6)
ERYTHROCYTE [DISTWIDTH] IN BLOOD BY AUTOMATED COUNT: 17.8 % (ref 11.6–15.1)
HCT VFR BLD AUTO: 27.7 % (ref 34.8–46.1)
HGB BLD-MCNC: 9.1 G/DL (ref 11.5–15.4)
IMM GRANULOCYTES # BLD AUTO: 0 THOUSAND/UL (ref 0–0.2)
IMM GRANULOCYTES NFR BLD AUTO: 0 % (ref 0–2)
LYMPHOCYTES # BLD AUTO: 1.32 THOUSANDS/ÂΜL (ref 0.6–4.47)
LYMPHOCYTES NFR BLD AUTO: 45 % (ref 14–44)
MCH RBC QN AUTO: 35.8 PG (ref 26.8–34.3)
MCHC RBC AUTO-ENTMCNC: 32.9 G/DL (ref 31.4–37.4)
MCV RBC AUTO: 109 FL (ref 82–98)
MONOCYTES # BLD AUTO: 0.43 THOUSAND/ÂΜL (ref 0.17–1.22)
MONOCYTES NFR BLD AUTO: 14 % (ref 4–12)
NEUTROPHILS # BLD AUTO: 1.19 THOUSANDS/ÂΜL (ref 1.85–7.62)
NEUTS SEG NFR BLD AUTO: 40 % (ref 43–75)
NRBC BLD AUTO-RTO: 0 /100 WBCS
PLATELET # BLD AUTO: 52 THOUSANDS/UL (ref 149–390)
PMV BLD AUTO: 9.1 FL (ref 8.9–12.7)
RBC # BLD AUTO: 2.54 MILLION/UL (ref 3.81–5.12)
WBC # BLD AUTO: 2.98 THOUSAND/UL (ref 4.31–10.16)

## 2025-06-03 PROCEDURE — 82787 IGG 1 2 3 OR 4 EACH: CPT

## 2025-06-03 PROCEDURE — 83520 IMMUNOASSAY QUANT NOS NONAB: CPT

## 2025-06-03 PROCEDURE — 82784 ASSAY IGA/IGD/IGG/IGM EACH: CPT

## 2025-06-03 PROCEDURE — 85025 COMPLETE CBC W/AUTO DIFF WBC: CPT

## 2025-06-03 PROCEDURE — 36415 COLL VENOUS BLD VENIPUNCTURE: CPT

## 2025-06-03 NOTE — PROGRESS NOTES
In-basket message received from Dr. You to add patient to the thoracic [facility] on 6/9/2025. Chart reviewed and prep completed.

## 2025-06-05 LAB
IGG SERPL-MCNC: 1491 MG/DL (ref 586–1602)
IGG1 SER-MCNC: 666 MG/DL (ref 248–810)
IGG2 SER-MCNC: 461 MG/DL (ref 130–555)
IGG3 SER-MCNC: 49 MG/DL (ref 15–102)
IGG4 SER-MCNC: 121 MG/DL (ref 2–96)

## 2025-06-06 LAB — TRYPTASE SERPL-MCNC: 7.8 UG/L (ref 2.2–13.2)

## 2025-06-07 LAB
IGG SERPL-MCNC: 483 MG/DL (ref 586–1602)
IGG1 SER-MCNC: 276 MG/DL (ref 248–810)
IGG2 SER-MCNC: 148 MG/DL (ref 130–555)
IGG3 SER-MCNC: 19 MG/DL (ref 15–102)
IGG4 SER-MCNC: 6 MG/DL (ref 2–96)

## 2025-06-09 ENCOUNTER — TELEPHONE (OUTPATIENT)
Dept: CARDIAC SURGERY | Facility: CLINIC | Age: 80
End: 2025-06-09

## 2025-06-09 ENCOUNTER — DOCUMENTATION (OUTPATIENT)
Dept: HEMATOLOGY ONCOLOGY | Facility: CLINIC | Age: 80
End: 2025-06-09

## 2025-06-09 DIAGNOSIS — R91.8 LUNG MASS: Primary | ICD-10-CM

## 2025-06-09 NOTE — PROGRESS NOTES
THORACIC ONCOLOGY MULTIDISCIPLINARY CASE REVIEW    DATE:  6/9/2025    PRESENTING DOCTOR:  Dr. You    DIAGNOSIS:  RLL Mass  STAGING:  N/A      Debby Barrios is a 79 y.o. female who was presented at the Thoracic Oncology Multidisciplinary Tumor Conference today. She presents to Thoracic Surgery with almost 4 cm right lower lobe mass which was in the setting of having had a influenza approximately 4 months ago. She was treated for this but a chest x-ray was performed which identified a mass and she was subsequently sent for the CT scan. She has a history of smoking but quit 4-5 years ago. She  PET CT was completed. On 05/15/25 she underwent IR biopsy of right lung and bone marrow biopsy.        REASON FOR DISCUSSION:  Pathology Review for plan of care    PHYSICIAN RECOMMENDED PLAN:  - Follow up with Dr. You       Imaging reviewed:   [x] PET CT - 04/22/25 PET CT  [x] CT chest - 03/27/25  [] CT chest abdomen pelvis  [] CT lung screening program  [] MRI brain   [] N/A        Pathology: Slides reviewed  05/15/25  LUNG, RIGHT LOWER LOBE, BIOPSY:    - Inflammatory cavitary mass with dense lymphoplasmacytic and neutrophilic inflammation and surrounding dense fibrous tissue.    - Negative for carcinoma    05/15/25 BONE MARROW - PERIPHERAL BLOOD, ASPIRATE SMEARS, CORE BIOPSY AND CLOT SECTION - LEFT ILIAC CREST: CELLULAR BONE MARROW, WITH MATURING, TRILINEAGE HEMATOPOIESIS, WITH NO INCREASE IN BLASTS OR SIGNIFICANT DYSPLASIA; MILD MASTOCYTOSIS AND POLYTYPIC PLASMACYTOSIS; MINUTE PAROXYSMAL NOCTURNAL HEMOGLOBINURIA (PNH) CLONE DETECTED        PFT's: No    Future imaging: No    Referrals: No    Clinical Trials Reviewed:  No  Clinical Trial Eligibility:     Future Appointments   Date Time Provider Department Center   6/26/2025  9:30 AM Ban Devlin PA-C HEM ONC STR Practice-Onc   6/30/2025 11:15 AM [facility] PULM ROOM 01 [facility]AG [facility]   7/1/2025  1:15 PM Jacki You MD [facility]           Team agreed to plan.  NCCN guidelines were readily available for review at this discussion    The final treatment plan will be left to the discretion of the patient and the treating physician.     DISCLAIMERS:  TO THE TREATING PHYSICIAN:  This conference is a meeting of clinicians from various specialty areas who evaluate and discuss patients for whom a multidisciplinary treatment approach is being considered. Please note that the above opinion was a consensus of the conference attendees and is intended only to assist in quality care of the discussed patient.  The responsibility for follow up on the input given during the conference, along with any final decisions regarding plan of care, is that of the patient and the patient's provider. Accordingly, appointments have only been recommended based on this information and have NOT been scheduled unless otherwise noted.      TO THE PATIENT:  This summary is a brief record of major aspects of your cancer treatment. You may choose to share a copy with any of your doctors or nurses. However, this is not a detailed or comprehensive record of your care.

## 2025-06-10 NOTE — TELEPHONE ENCOUNTER
Pt is requesting a call back to discuss this matter. She will be home for the next 30 mins so she would like a call back at [phone] ASAP to clarify her next steps. Thank you.

## 2025-06-12 LAB
MISCELLANEOUS LAB TEST RESULT: NORMAL
MISCELLANEOUS LAB TEST RESULT: NORMAL
SCAN RESULT: NORMAL
TRYPTASE SERPL-MCNC: 3.1 UG/L (ref 2.2–13.2)

## 2025-06-13 DIAGNOSIS — D61.818 PANCYTOPENIA (HCC): Primary | ICD-10-CM

## 2025-06-13 DIAGNOSIS — R91.8 LUNG MASS: ICD-10-CM

## 2025-06-16 ENCOUNTER — APPOINTMENT (OUTPATIENT)
Dept: LAB | Facility: HOSPITAL | Age: 80
End: 2025-06-16
Payer: COMMERCIAL

## 2025-06-16 ENCOUNTER — TELEPHONE (OUTPATIENT)
Age: 80
End: 2025-06-16

## 2025-06-16 ENCOUNTER — TELEPHONE (OUTPATIENT)
Dept: HEMATOLOGY ONCOLOGY | Facility: CLINIC | Age: 80
End: 2025-06-16

## 2025-06-16 DIAGNOSIS — E04.1 THYROID NODULE: ICD-10-CM

## 2025-06-16 DIAGNOSIS — R91.8 LUNG MASS: ICD-10-CM

## 2025-06-16 DIAGNOSIS — D61.818 PANCYTOPENIA (HCC): ICD-10-CM

## 2025-06-16 DIAGNOSIS — D61.818 PANCYTOPENIA (HCC): Primary | ICD-10-CM

## 2025-06-16 LAB
ANISOCYTOSIS BLD QL SMEAR: PRESENT
BASOPHILS # BLD AUTO: 0.03 THOUSAND/UL (ref 0–0.1)
BASOPHILS NFR MAR MANUAL: 1 % (ref 0–1)
EOSINOPHIL # BLD AUTO: 0.05 THOUSAND/UL (ref 0–0.61)
EOSINOPHIL NFR BLD MANUAL: 2 % (ref 0–6)
ERYTHROCYTE [DISTWIDTH] IN BLOOD BY AUTOMATED COUNT: 16.1 % (ref 11.6–15.1)
HCT VFR BLD AUTO: 28.7 % (ref 34.8–46.1)
HGB BLD-MCNC: 9.3 G/DL (ref 11.5–15.4)
LYMPHOCYTES # BLD AUTO: 1.34 THOUSAND/UL (ref 0.6–4.47)
LYMPHOCYTES # BLD AUTO: 52 %
MACROCYTES BLD QL AUTO: PRESENT
MCH RBC QN AUTO: 36.9 PG (ref 26.8–34.3)
MCHC RBC AUTO-ENTMCNC: 32.4 G/DL (ref 31.4–37.4)
MCV RBC AUTO: 114 FL (ref 82–98)
MONOCYTES # BLD AUTO: 0.28 THOUSAND/UL (ref 0–1.22)
MONOCYTES NFR BLD AUTO: 11 % (ref 4–12)
NEUTS SEG # BLD: 0.87 THOUSAND/UL (ref 1.81–6.82)
NEUTS SEG NFR BLD AUTO: 34 %
NRBC BLD AUTO-RTO: 0 /100 WBCS
PLATELET # BLD AUTO: 47 THOUSANDS/UL (ref 149–390)
PLATELET BLD QL SMEAR: ABNORMAL
PMV BLD AUTO: 11 FL (ref 8.9–12.7)
RBC # BLD AUTO: 2.52 MILLION/UL (ref 3.81–5.12)
RBC MORPH BLD: PRESENT
TOTAL CELLS COUNTED SPEC: 100
WBC # BLD AUTO: 2.57 THOUSAND/UL (ref 4.31–10.16)

## 2025-06-16 PROCEDURE — 88184 FLOWCYTOMETRY/ TC 1 MARKER: CPT

## 2025-06-16 PROCEDURE — 86037 ANCA TITER EACH ANTIBODY: CPT

## 2025-06-16 PROCEDURE — 82164 ANGIOTENSIN I ENZYME TEST: CPT

## 2025-06-16 PROCEDURE — 83521 IG LIGHT CHAINS FREE EACH: CPT

## 2025-06-16 PROCEDURE — 85007 BL SMEAR W/DIFF WBC COUNT: CPT

## 2025-06-16 PROCEDURE — 83520 IMMUNOASSAY QUANT NOS NONAB: CPT

## 2025-06-16 PROCEDURE — 36415 COLL VENOUS BLD VENIPUNCTURE: CPT

## 2025-06-16 PROCEDURE — 88185 FLOWCYTOMETRY/TC ADD-ON: CPT | Performed by: PHYSICIAN ASSISTANT

## 2025-06-16 PROCEDURE — 88185 FLOWCYTOMETRY/TC ADD-ON: CPT

## 2025-06-16 NOTE — TELEPHONE ENCOUNTER
Labs demonstrate persistent pancytopenia of unclear etiology. I would like her to see Dr. Patel tomorrow.  I called and left a voice message for patient.  I also was in touch with her daughter who is agreeable to take her to tomorrow's appointment at 2:00pm in [facility]

## 2025-06-16 NOTE — TELEPHONE ENCOUNTER
We have received an ASAP referral for this patient, there are no openings within the recommended timeframe. Please advise    Date Referral Received: 6/13/25    Priority of Referral: ASAP    Referred by: Ban Devlin PA-C    Diagnosis: Pancytopenia, Lung mass  (CT 3/27, PET 4/22, IR Biopsy lung 5/15, upcomming CT 7/15)    Time Frame: 3 days    Location: [place]

## 2025-06-17 ENCOUNTER — OFFICE VISIT (OUTPATIENT)
Dept: HEMATOLOGY ONCOLOGY | Facility: CLINIC | Age: 80
End: 2025-06-17
Payer: COMMERCIAL

## 2025-06-17 ENCOUNTER — DOCUMENTATION (OUTPATIENT)
Dept: HEMATOLOGY ONCOLOGY | Facility: CLINIC | Age: 80
End: 2025-06-17

## 2025-06-17 VITALS
DIASTOLIC BLOOD PRESSURE: 60 MMHG | RESPIRATION RATE: 18 BRPM | BODY MASS INDEX: 24.66 KG/M2 | TEMPERATURE: 98.2 F | SYSTOLIC BLOOD PRESSURE: 144 MMHG | HEIGHT: 62 IN | HEART RATE: 94 BPM | OXYGEN SATURATION: 100 % | WEIGHT: 134 LBS

## 2025-06-17 DIAGNOSIS — D47.2 MONOCLONAL GAMMOPATHY: ICD-10-CM

## 2025-06-17 DIAGNOSIS — D61.818 PANCYTOPENIA (HCC): Primary | ICD-10-CM

## 2025-06-17 DIAGNOSIS — E86.0 DEHYDRATION: ICD-10-CM

## 2025-06-17 DIAGNOSIS — G89.29 CHRONIC MIDLINE LOW BACK PAIN WITHOUT SCIATICA: ICD-10-CM

## 2025-06-17 DIAGNOSIS — E53.8 B12 DEFICIENCY: ICD-10-CM

## 2025-06-17 DIAGNOSIS — K90.9 INTESTINAL MALABSORPTION, UNSPECIFIED TYPE: ICD-10-CM

## 2025-06-17 DIAGNOSIS — M54.50 CHRONIC MIDLINE LOW BACK PAIN WITHOUT SCIATICA: ICD-10-CM

## 2025-06-17 DIAGNOSIS — E04.1 THYROID NODULE: ICD-10-CM

## 2025-06-17 DIAGNOSIS — R19.7 WATERY DIARRHEA: ICD-10-CM

## 2025-06-17 DIAGNOSIS — R91.8 LUNG MASS: ICD-10-CM

## 2025-06-17 DIAGNOSIS — E55.9 VITAMIN D DEFICIENCY: ICD-10-CM

## 2025-06-17 DIAGNOSIS — R42 POSTURAL DIZZINESS: ICD-10-CM

## 2025-06-17 LAB
ACE SERPL-CCNC: 39 U/L (ref 14–82)
KAPPA LC FREE SER-MCNC: 35.2 MG/L (ref 3.3–19.4)
KAPPA LC FREE/LAMBDA FREE SER: 0.96 {RATIO} (ref 0.26–1.65)
LAMBDA LC FREE SERPL-MCNC: 36.6 MG/L (ref 5.7–26.3)

## 2025-06-17 PROCEDURE — 99215 OFFICE O/P EST HI 40 MIN: CPT | Performed by: INTERNAL MEDICINE

## 2025-06-17 RX ORDER — LOPERAMIDE HYDROCHLORIDE 2 MG/1
2 TABLET ORAL 4 TIMES DAILY PRN
COMMUNITY

## 2025-06-17 NOTE — PATIENT INSTRUCTIONS
Please increase fluid intake, get blood work tomorrow and stool test tomorrow.  GI consultation at [facility] within 1-2 weeks as soon as possible.  Follow-up with Dr. Stacie Devlin within a week.  Please take B12 tablet every day.  Additional recommendations to follow-up from Dr. Devlin.  Please use compression stockings

## 2025-06-18 ENCOUNTER — HOSPITAL ENCOUNTER (OUTPATIENT)
Dept: RADIOLOGY | Facility: HOSPITAL | Age: 80
Discharge: HOME | End: 2025-06-18
Payer: COMMERCIAL

## 2025-06-18 ENCOUNTER — APPOINTMENT (OUTPATIENT)
Dept: LAB | Facility: HOSPITAL | Age: 80
End: 2025-06-18
Payer: COMMERCIAL

## 2025-06-18 DIAGNOSIS — R91.8 LUNG MASS: ICD-10-CM

## 2025-06-18 DIAGNOSIS — D47.2 MONOCLONAL GAMMOPATHY: ICD-10-CM

## 2025-06-18 DIAGNOSIS — G89.29 CHRONIC MIDLINE LOW BACK PAIN WITHOUT SCIATICA: ICD-10-CM

## 2025-06-18 DIAGNOSIS — D61.818 PANCYTOPENIA (HCC): ICD-10-CM

## 2025-06-18 DIAGNOSIS — E04.1 THYROID NODULE: ICD-10-CM

## 2025-06-18 DIAGNOSIS — E53.8 B12 DEFICIENCY: ICD-10-CM

## 2025-06-18 DIAGNOSIS — M54.50 CHRONIC MIDLINE LOW BACK PAIN WITHOUT SCIATICA: ICD-10-CM

## 2025-06-18 DIAGNOSIS — R19.7 WATERY DIARRHEA: ICD-10-CM

## 2025-06-18 LAB
ALBUMIN SERPL BCG-MCNC: 3.9 G/DL (ref 3.5–5)
ALP SERPL-CCNC: 71 U/L (ref 34–104)
ALT SERPL W P-5'-P-CCNC: 9 U/L (ref 7–52)
ANION GAP SERPL CALCULATED.3IONS-SCNC: 3 MMOL/L (ref 4–13)
AST SERPL W P-5'-P-CCNC: 15 U/L (ref 13–39)
BASOPHILS # BLD AUTO: 0.02 THOUSANDS/ÂΜL (ref 0–0.1)
BASOPHILS NFR BLD AUTO: 1 % (ref 0–1)
BILIRUB SERPL-MCNC: 0.48 MG/DL (ref 0.2–1)
BUN SERPL-MCNC: 14 MG/DL (ref 5–25)
CALCIUM SERPL-MCNC: 8.9 MG/DL (ref 8.4–10.2)
CHLORIDE SERPL-SCNC: 107 MMOL/L (ref 96–108)
CO2 SERPL-SCNC: 29 MMOL/L (ref 21–32)
CREAT SERPL-MCNC: 0.8 MG/DL (ref 0.6–1.3)
EOSINOPHIL # BLD AUTO: 0.05 THOUSAND/ÂΜL (ref 0–0.61)
EOSINOPHIL NFR BLD AUTO: 1 % (ref 0–6)
ERYTHROCYTE [DISTWIDTH] IN BLOOD BY AUTOMATED COUNT: 16.4 % (ref 11.6–15.1)
GFR SERPL CREATININE-BSD FRML MDRD: 71 ML/MIN/1.73SQ M
GLUCOSE P FAST SERPL-MCNC: 80 MG/DL (ref 65–99)
HCT VFR BLD AUTO: 26.9 % (ref 34.8–46.1)
HGB BLD-MCNC: 8.9 G/DL (ref 11.5–15.4)
IMM GRANULOCYTES # BLD AUTO: 0.01 THOUSAND/UL (ref 0–0.2)
IMM GRANULOCYTES NFR BLD AUTO: 0 % (ref 0–2)
LYMPHOCYTES # BLD AUTO: 1.45 THOUSANDS/ÂΜL (ref 0.6–4.47)
LYMPHOCYTES NFR BLD AUTO: 39 % (ref 14–44)
MAGNESIUM SERPL-MCNC: 2 MG/DL (ref 1.9–2.7)
MCH RBC QN AUTO: 37.7 PG (ref 26.8–34.3)
MCHC RBC AUTO-ENTMCNC: 33.1 G/DL (ref 31.4–37.4)
MCV RBC AUTO: 114 FL (ref 82–98)
MONOCYTES # BLD AUTO: 0.5 THOUSAND/ÂΜL (ref 0.17–1.22)
MONOCYTES NFR BLD AUTO: 13 % (ref 4–12)
NEUTROPHILS # BLD AUTO: 1.73 THOUSANDS/ÂΜL (ref 1.85–7.62)
NEUTS SEG NFR BLD AUTO: 46 % (ref 43–75)
NRBC BLD AUTO-RTO: 0 /100 WBCS
PHOSPHATE SERPL-MCNC: 3.7 MG/DL (ref 2.3–4.1)
PLATELET # BLD AUTO: 47 THOUSANDS/UL (ref 149–390)
PMV BLD AUTO: 10.7 FL (ref 8.9–12.7)
POTASSIUM SERPL-SCNC: 4.8 MMOL/L (ref 3.5–5.3)
PROT SERPL-MCNC: 7.2 G/DL (ref 6.4–8.4)
RBC # BLD AUTO: 2.36 MILLION/UL (ref 3.81–5.12)
SODIUM SERPL-SCNC: 139 MMOL/L (ref 135–147)
TSH SERPL DL<=0.05 MIU/L-ACNC: 1.12 UIU/ML (ref 0.45–4.5)
WBC # BLD AUTO: 3.76 THOUSAND/UL (ref 4.31–10.16)

## 2025-06-18 PROCEDURE — 84443 ASSAY THYROID STIM HORMONE: CPT

## 2025-06-18 PROCEDURE — 85025 COMPLETE CBC W/AUTO DIFF WBC: CPT

## 2025-06-18 PROCEDURE — 84100 ASSAY OF PHOSPHORUS: CPT

## 2025-06-18 PROCEDURE — 83735 ASSAY OF MAGNESIUM: CPT

## 2025-06-18 PROCEDURE — 80053 COMPREHEN METABOLIC PANEL: CPT

## 2025-06-18 PROCEDURE — 72110 X-RAY EXAM L-2 SPINE 4/>VWS: CPT

## 2025-06-18 PROCEDURE — 36415 COLL VENOUS BLD VENIPUNCTURE: CPT

## 2025-06-18 PROCEDURE — 83918 ORGANIC ACIDS TOTAL QUANT: CPT

## 2025-06-19 ENCOUNTER — APPOINTMENT (OUTPATIENT)
Dept: LAB | Facility: HOSPITAL | Age: 80
End: 2025-06-19

## 2025-06-19 LAB
C-ANCA TITR SER IF: NORMAL TITER
MYELOPEROXIDASE AB SER IA-ACNC: <0.2 UNITS (ref 0–0.9)
P-ANCA ATYPICAL TITR SER IF: NORMAL TITER
P-ANCA TITR SER IF: NORMAL TITER
PROTEINASE3 AB SER IA-ACNC: <0.2 UNITS (ref 0–0.9)

## 2025-06-22 LAB — METHYLMALONATE SERPL-SCNC: 255 NMOL/L (ref 0–378)

## 2025-06-23 NOTE — TELEPHONE ENCOUNTER
Patient called in to schedule an appointment / there are no openings with in the recommended time frame . I sent secure message to Phyllis [email or web address] Asking if she had any appointments avaiable for me to offer . No response . I asked the patient if she willing to go to another office location . Patient decline and would like to be seen at the [place] office . Please advise     Date Referral Received: 6/13/25    Priority of Referral: ASAP    Referred by: ASHELY / Ban Devlin       Diagnosis: Lung mass / Pancytopenia     Time Frame: 3days     Location: [place]

## 2025-06-23 NOTE — ASSESSMENT & PLAN NOTE
Biopsy came back negative for cancer but I am concerned about this mass.  See below.  Orders:    Comprehensive metabolic panel; Future

## 2025-06-23 NOTE — TELEPHONE ENCOUNTER
LVM for patient to inform her that we do not have availability in [place] for the next several weeks and we would be able to schedule in [place]. When patient calls because please inform patient of this and assist scheduling at another location.

## 2025-06-23 NOTE — PROGRESS NOTES
Name: Debby Barrios      : 1945      MRN: [id number]  Encounter Provider: Umair Patel MD  Encounter Date: 2025   Encounter department: [facility] [place].    Reviewed medical records.  :  Assessment & Plan  Pancytopenia (HCC)  Workup is in progress.  See details below.  Orders:    CBC and differential; Future    Comprehensive metabolic panel; Future    US abdomen complete; Future    Thyroid nodule  Thyroid biopsy showed [place] III.  See below.       Lung mass  Biopsy came back negative for cancer but I am concerned about this mass.  See below.  Orders:    Comprehensive metabolic panel; Future    Watery diarrhea  She needs GI evaluation, hydration and monitoring of electrolytes and minerals  Orders:    Comprehensive metabolic panel; Future    Magnesium; Future    Phosphorus; Future    US abdomen complete; Future    Ambulatory referral to Gastroenterology; Future    Clostridioides difficile toxin by PCR with EIA; Future    Monoclonal gammopathy  Needs monitoring  Orders:    Comprehensive metabolic panel; Future    Dehydration  Needs hydration and monitoring of electrolytes and minerals       Postural dizziness  Patient should get up slowly.  Also suggested compression stockings.       Intestinal malabsorption, unspecified type    Orders:    US abdomen complete; Future    Ambulatory referral to Gastroenterology; Future    B12 deficiency  Patient should take B12 tablet daily.  Orders:    Methylmalonic acid, serum; Future    Chronic midline low back pain without sciatica  Being managed by PCP  Orders:    XR spine lumbar minimum 4 views non injury; Future    Vitamin D deficiency  To check level and manage  Orders:    Vitamin D 25 hydroxy; Future    Please increase fluid intake, get blood work tomorrow and stool test tomorrow.  GI consultation at [facility] within 1-2 weeks as soon as possible.  Follow-up with Dr. Stacie Devlin within a week.  Please take B12  tablet every day.  Additional recommendations to follow-up from Dr. Devlin.  Please use compression stockings  See diagnoses, orders and instructions above.  There is no good explanation for pancytopenia.  MDS was suspected but not proven.  That is still a possibility.  Goal is to monitor her condition and blood work and provide symptomatic care.  If hemoglobin continues to drop she could be considered for luspatercept for suspected MDS with anemia if no contraindication and approved by insurance carrier otherwise KIRSTEN if approved by insurance carrier or else packed RBC blood transfusions.  Platelet count has actually come up.  WBC count has remained stable.  She has macrocytosis.  She already had extensive workup for pancytopenia and see details in the notes of Stacie Devlin.  B12 level is low normal and she will start taking B12 on regular basis.   She had thyroid biopsy and that came back [place] III and that needs to be followed by her thyroid specialist because small percentage of  [place] III could have cancer even now.  She has cavitary lung mass and she should continue to follow with her thoracic surgeon to rule out cancer. I am concerned about this lung mass .   Additional recommendations are listed above.  Patient needs some assistance in her care.  All discussed in detail.  Questions answered.  I suggested self breast examination, eating healthy foods, staying active as tolerated but to avoid falls and trauma.  Suggested health screening tests. Patient to continue to follow-up with primary physician and other consultants.  Provided counseling and support.  I used a dictation device to dictate this note and there could be mistakes in my note and for that patient may contact my office.    Return if symptoms worsen or fail to improve.    History of Present Illness   Chief Complaint   Patient presents with    Follow-up    Transition of Care Management   Patient is here with her friend.  Patient is  "here for pancytopenia.  She had extensive workup from Dr. Devlin at [facility] and see all that in very much detail in her medical records in the epic.  Patient has tiredness, dizziness, postural hypotension like symptoms lasting for few seconds when she tries to get up quickly, chronic diarrhea, panic attacks and that time she has problem catching breath and occasionally mild cough.  She states she has good appetite but has lost few pounds.  She had thyroid biopsy and that came back [place] III and that needs to be followed by her thyroid specialist because small percentage of  [place] III could have cancer even now.  She has cavitary lung mass and she should continue to follow with her thoracic surgeon to rule out cancer.  Occasionally she gets frontal headache.  Occasionally low back discomfort.  Some vision and hearing problem.  She had been a long-term smoker starting age 16 and 1 pack would last for 2-3 days.  She quit smoking cigarettes 5 to 6 years ago.  No alcohol.    Pertinent Medical History   See details in HPI and assessment section     Review of Systems  Reviewed 12 systems.  Symptoms are in HPI.  No other neurological, cardiac, pulmonary, GI and  symptom other than listed in HPI.  No other symptoms other than listed in HPI.      Objective   /60 (BP Location: Left arm, Patient Position: Sitting, Cuff Size: Adult)   Pulse 94   Temp 98.2 °F (36.8 °C) (Temporal)   Resp 18   Ht 5' 2\" (1.575 m)   Wt 60.8 kg (134 lb)   SpO2 100%   BMI 24.51 kg/m²   Blood pressure 140/80 sitting and 135/80 standing-no significant drop  Pain Screening:  Pain Score: 0-No pain  ECOG   2  Physical Exam  Vitals reviewed.   Constitutional:       General: She is not in acute distress.     Appearance: Normal appearance. She is not ill-appearing.   HENT:      Head: Normocephalic and atraumatic.      Mouth/Throat:      Mouth: Mucous membranes are moist.      Comments: No thrush.    Eyes:      General: No scleral " icterus.        Right eye: No discharge.         Left eye: No discharge.      Conjunctiva/sclera: Conjunctivae normal.       Cardiovascular:      Rate and Rhythm: Normal rate and regular rhythm.      Heart sounds: Normal heart sounds. No murmur heard.  Pulmonary:      Effort: Pulmonary effort is normal. No respiratory distress.      Breath sounds: Normal breath sounds. No rhonchi or rales.   Abdominal:      General: Abdomen is flat. There is no distension.      Palpations: Abdomen is soft. There is no mass.      Tenderness: There is no abdominal tenderness.      Comments: No ascites.      Musculoskeletal:         General: No swelling or tenderness. Normal range of motion.      Cervical back: Normal range of motion. No rigidity or tenderness.      Right lower leg: No edema.      Left lower leg: No edema.      Comments: No calf tenderness.   Lymphadenopathy:      Cervical: No cervical adenopathy.      Upper Body:      Right upper body: No supraclavicular or axillary adenopathy.      Left upper body: No supraclavicular or axillary adenopathy.     Skin:     General: Skin is warm.      Coloration: Skin is not jaundiced or pale.      Findings: No bruising or rash.      Nails: There is no clubbing.     Neurological:      General: No focal deficit present.      Mental Status: She is alert and oriented to person, place, and time.      Motor: Weakness (Generalized weakness.) present.      Coordination: Coordination normal.      Gait: Gait normal.     Psychiatric:         Behavior: Behavior normal.         Thought Content: Thought content normal.      Comments: Anxious.         Labs: I have reviewed the following labs:  Lab Results   Component Value Date/Time    WBC 3.76 (L) 06/18/2025 12:47 PM    RBC 2.36 (L) 06/18/2025 12:47 PM    Hemoglobin 8.9 (L) 06/18/2025 12:47 PM    Hematocrit 26.9 (L) 06/18/2025 12:47 PM     (H) 06/18/2025 12:47 PM    MCH 37.7 (H) 06/18/2025 12:47 PM    RDW 16.4 (H) 06/18/2025 12:47 PM     Platelets 47 (L) 06/18/2025 12:47 PM    Segmented % 46 06/18/2025 12:47 PM    Lymphocytes % 39 06/18/2025 12:47 PM    Monocytes % 13 (H) 06/18/2025 12:47 PM    Eosinophils Relative 1 06/18/2025 12:47 PM    Basophils Relative 1 06/18/2025 12:47 PM    Immature Grans % 0 06/18/2025 12:47 PM    Absolute Neutrophils 1.73 (L) 06/18/2025 12:47 PM     Lab Results   Component Value Date/Time    Potassium 4.8 06/18/2025 12:47 PM    Chloride 107 06/18/2025 12:47 PM    CO2 29 06/18/2025 12:47 PM    BUN 14 06/18/2025 12:47 PM    Creatinine 0.80 06/18/2025 12:47 PM    Glucose, Fasting 80 06/18/2025 12:47 PM    Calcium 8.9 06/18/2025 12:47 PM    AST 15 06/18/2025 12:47 PM    ALT 9 06/18/2025 12:47 PM    Alkaline Phosphatase 71 06/18/2025 12:47 PM    Total Protein 7.2 06/18/2025 12:47 PM    Albumin 3.9 06/18/2025 12:47 PM    Total Bilirubin 0.48 06/18/2025 12:47 PM    eGFR 71 06/18/2025 12:47 PM   CBC  Status: Final result      Contains abnormal data CBC  Order: [id number]   Status: Final result       Next appt: 06/25/2025 at 09:30 AM in Radiology ([facility] ES US 1)    Test Result Released: Yes (seen)    0 Result Notes   important suggestion  Newer results are available. Click to view them now.              Component  Ref Range & Units (hover) 5/16/25  4:53 AM 5/15/25  5:22 AM 5/14/25  3:20 PM 5/14/25  9:37 AM 9/18/24  1:37 PM 8/7/24  2:09 PM   WBC 3.57 Low  3.59 Low  3.51 Low  3.35 Low  9.41 11.08 High    RBC 2.50 Low  2.49 Low  2.89 Low  3.03 Low  3.80 Low  R 3.82   Hemoglobin 8.6 Low  8.8 Low  10.0 Low  10.6 Low  11.9 Low  R 12.2   Hematocrit 26.6 Low  26.1 Low  30.3 Low  31.8 Low  37.4 R 37.7    High  105 High  105 High  105 High  98 99 High    MCH 34.4 High  35.3 High  34.6 High  35.0 High  31.3 31.9   MCHC 32.3 33.7 33.0 33.3 31.8 32.4   RDW 17.5 High  17.9 High  18.2 High  18.3 High  14.1 13.9   Platelets 133 Low  86 Low  CM 59 Low  CM 53 Low   330   MPV 9.9 8.9 9.6 9.7 10.6 10.9        Results  Bone Marrow  Biopsy/Aspirate Exam (Order [id number])  Linked Results    Procedure Abnormality Status   Bone Marrow Biopsy/Aspirate Exam  Edited Result - FINAL   Tissue Exam  Edited Result - FINAL        Bone Marrow Biopsy/Aspirate Exam (Order [id number])  Result Information    Status Priority Source   Edited Result - FINAL (6/20/2025  1:25 PM) Routine Iliac Crest, Left      Related Results     CBC and differential Final result Collected 6/18/2025 12:47 PM                CBC and differential Final result Collected 6/16/2025 11:29 AM                CBC and differential Final result Collected 6/3/2025 12:52 PM                Path Slide Review Final result 5/15/2025           Path Slide   PERIPHERAL BLOOD SMEAR (05/15/2025): PANCYTOPENIA INCLUDING MACROCYTIC ANEMIA AND NEUTROPENIA; SEE IMPRESSION     IMPRESSION:  The peripheral blood smear shows pancytopenia, including macrocytic anemia, neutropenia and thrombocytopenia. There is no circulating blast population, significant dysplasia, rouleaux formation, infectious organisms or evidence of hemolysis.      See comment (below) for microscopic description and concurrent bone marrow biopsy ([id number]) for further characterization.      COMMENT:  CBC DATA (05/15/2025):   WBC 3.51 K/uL (ANC 1.33 K/uL, ALC 1.94 K/uL, AMC 0.29 K/uL, AEC 0.04 K/uL), RBC 2.53 million/uL, Hgb 8.9 g/dL, Hct 26.5%,  fL, MCH 35.2 pg, MCHC 33.6 g/dL, RDW 17.9%, plt 107 K/uL     MICROSCOPIC DESCRIPTION:   A Fonseca stained peripheral blood smear is received for review. There is a macrocytic, normochromic anemia (Hgb 8.9 g/dL), with nonspecific anisopoikilocytosis. There is polychromasia and no significant circulating nucleated red blood cells. There is no evidence of red blood cell agglutination, hypergammaglobulinemia, cryoglobulins or rouleaux formation. Leukocytes are overall decreased in number (WBC 3.51 K/uL). Neutrophils are decreased in number (ANC 1.33 K/uL) and show no significant morphologic  abnormalities. Lymphocytes are adequate in number and relatively increased (ALC 1.94 K/uL; 54%) and show no significant morphologic abnormalities; there are no definitive circulating plasma cells. Monocytes, eosinophils and basophils are adequate in number with no significant morphologic abnormalities. There are no circulating blasts or blast equivalents. Platelets are decreased in number (107 K/uL) and normal in morphology, with no evidence of platelet clumping or satellitism. There are no intracellular inclusions, infectious organisms or overt viral cytopathic changes.     ZINA Cedeño MD  Interpretation performed at [facility], [facility] [place] [address].                Other Results from 5/14/2025     Prepare Leukoreduced Platelet Pheresis (1 pheresis product = 6-8 pooled units): 2 Product Edited Result - FINAL     Immunoglobulin free LT chains blood Final result 5/16/2025    CBC Final result 5/16/2025    Basic metabolic panel Final result 5/16/2025    Protein electrophoresis, urine Final result 5/15/2025    Path Interpretation, Urine Protein Electrophoresis Final result 5/15/2025    Immunofixation,Urine(Reflex Only-Do Not Order) Final result 5/15/2025    IgG, IgA, IgM Final result 5/15/2025    Leukemia/Lymphoma flow cytometry Final result 5/15/2025    CBC and Platelet Final result 5/15/2025    Peripheral Smear Edited Result - FINAL 5/15/2025    Prepare Leukoreduced Platelet Pheresis (1 pheresis product = 6-8 pooled units): 1 Product Edited Result - FINAL     ABORh Recheck - Contact Blood Bank Prior to Collection Final result 5/14/2025    Comprehensive metabolic panel Final result 5/14/2025    Hemolysis Smear Final result 5/14/2025    Haptoglobin Final result 5/14/2025    Lactate dehydrogenase Final result 5/14/2025    PNH profile, WBC Final result 5/14/2025    Vitamin B12 Final result 5/14/2025    Folate Final result 5/14/2025       important suggestion  Warning: Additional results from  5/14/2025 are available but are not displayed in this report.      Tissue Exam (Order [id number])    Result Information    Status Priority Source   Edited Result - FINAL (6/20/2025  1:25 PM) Routine Lung      Related Results     Path Slide Review Final result 5/15/2025           Path Slide   PERIPHERAL BLOOD SMEAR (05/15/2025): PANCYTOPENIA INCLUDING MACROCYTIC ANEMIA AND NEUTROPENIA; SEE IMPRESSION     IMPRESSION:  The peripheral blood smear shows pancytopenia, including macrocytic anemia, neutropenia and thrombocytopenia. There is no circulating blast population, significant dysplasia, rouleaux formation, infectious organisms or evidence of hemolysis.      See comment (below) for microscopic description and concurrent bone marrow biopsy ([id number]) for further characterization.      COMMENT:  CBC DATA (05/15/2025):   WBC 3.51 K/uL (ANC 1.33 K/uL, ALC 1.94 K/uL, AMC 0.29 K/uL, AEC 0.04 K/uL), RBC 2.53 million/uL, Hgb 8.9 g/dL, Hct 26.5%,  fL, MCH 35.2 pg, MCHC 33.6 g/dL, RDW 17.9%, plt 107 K/uL     MICROSCOPIC DESCRIPTION:   A Fonseca stained peripheral blood smear is received for review. There is a macrocytic, normochromic anemia (Hgb 8.9 g/dL), with nonspecific anisopoikilocytosis. There is polychromasia and no significant circulating nucleated red blood cells. There is no evidence of red blood cell agglutination, hypergammaglobulinemia, cryoglobulins or rouleaux formation. Leukocytes are overall decreased in number (WBC 3.51 K/uL). Neutrophils are decreased in number (ANC 1.33 K/uL) and show no significant morphologic abnormalities. Lymphocytes are adequate in number and relatively increased (ALC 1.94 K/uL; 54%) and show no significant morphologic abnormalities; there are no definitive circulating plasma cells. Monocytes, eosinophils and basophils are adequate in number with no significant morphologic abnormalities. There are no circulating blasts or blast equivalents. Platelets are decreased in number  (107 K/uL) and normal in morphology, with no evidence of platelet clumping or satellitism. There are no intracellular inclusions, infectious organisms or overt viral cytopathic changes.     ZINA Cedeño MD  Interpretation performed at [facility], [facility] [place] [address].                Other Results from 5/14/2025     Prepare Leukoreduced Platelet Pheresis (1 pheresis product = 6-8 pooled units): 2 Product Edited Result - FINAL     Immunoglobulin free LT chains blood Final result 5/16/2025    CBC Final result 5/16/2025    Basic metabolic panel Final result 5/16/2025    Protein electrophoresis, urine Final result 5/15/2025    Path Interpretation, Urine Protein Electrophoresis Final result 5/15/2025    Immunofixation,Urine(Reflex Only-Do Not Order) Final result 5/15/2025    IgG, IgA, IgM Final result 5/15/2025    Leukemia/Lymphoma flow cytometry Final result 5/15/2025    CBC and Platelet Final result 5/15/2025    Peripheral Smear Edited Result - FINAL 5/15/2025    Prepare Leukoreduced Platelet Pheresis (1 pheresis product = 6-8 pooled units): 1 Product Edited Result - FINAL     ABORh Recheck - Contact Blood Bank Prior to Collection Final result 5/14/2025    Comprehensive metabolic panel Final result 5/14/2025    Hemolysis Smear Final result 5/14/2025    Haptoglobin Final result 5/14/2025    Lactate dehydrogenase Final result 5/14/2025    PNH profile, WBC Final result 5/14/2025    Vitamin B12 Final result 5/14/2025    Folate Final result 5/14/2025       important suggestion  Warning: Additional results from 5/14/2025 are available but are not displayed in this report.     Bone Marrow Biopsy/Aspirate Exam: [id number]  Order: [id number]   Collected 5/15/2025 11:27 AM       Status: Edited Result - FINAL    Test Result Released: Yes (not seen)    0 Result Notes      Component  Ref Range & Units (hover)    Case Report   Surgical Pathology Report                         Case: [id number]                                    Authorizing Provider:  Jacki You MD    Collected:           05/15/2025 1136               Ordering Location:     [facility], [facility] Received:            05/15/2025 1225                                      4th Floor Med Surg Unit                                                       Pathologist:           Farrukh Cedeño MD                                                                           Specimens:   A) - Iliac Crest, Left, core                                                                        B) - Iliac Crest, Left, clot                                                                        C) - Iliac Crest, Left, slides                                                                      D) - Lung, RLL                                                                            Addendum   A Congo red stain is negative for amyloid deposition. The diagnosis is unchanged.      ZINA Cedeño MD  Interpretation performed at [facility], [facility] [place] [address].    Addendum electronically signed by Farrukh Cedeño MD on 6/20/2025 at 1324 EDT   Final Diagnosis   A-C. BONE MARROW - PERIPHERAL BLOOD, ASPIRATE SMEARS, CORE BIOPSY AND CLOT SECTION - LEFT ILIAC CREST: CELLULAR BONE MARROW, WITH MATURING, TRILINEAGE HEMATOPOIESIS, WITH NO INCREASE IN BLASTS OR SIGNIFICANT DYSPLASIA; MILD MASTOCYTOSIS AND POLYTYPIC PLASMACYTOSIS; MINUTE PAROXYSMAL NOCTURNAL HEMOGLOBINURIA (PNH) CLONE DETECTED; SEE COMBINED IMPRESSION AND COMMENT A        D. LUNG, RIGHT LOWER LOBE, BIOPSY:    - Inflammatory cavitary mass with dense lymphoplasmacytic and neutrophilic inflammation and surrounding dense fibrous tissue.    - Negative for carcinoma; see combined impression and comment D.                 NM PET CT skull base to mid thigh  Status: Final result     PACS Images - Postini     Show  images for NM PET CT skull base to mid thigh  PACS Images - Sectra     Show images for NM PET CT skull base to mid thigh    NM PET CT skull base to mid thigh: Result Notes     Ann RANDALL Malcolm  4/24/2025 11:50 AM EDT       Bipin Bush,    I just wanted to send this over to you for your review as this patient is established with you.            Study Result    Result Text   PET/CT SCAN     INDICATION: D38.1: Neoplasm of uncertain behavior of trachea, bronchus and lung  R91.8: Other nonspecific abnormal finding of lung field. Newly diagnosed right lower lobe lung mass. Incidental right thyroid nodule.     MODIFIER: PI     COMPARISON: CT chest 03/27/2025, thyroid ultrasound 04/14/2025     CELL TYPE: NA.     TECHNIQUE:   7.94 mCi F-18-FDG administered IV. Multiplanar attenuation corrected and non attenuation corrected PET images are available for interpretation, and contiguous, low dose, axial CT sections were obtained from the skull base through the femurs.   Intravenous contrast material was not utilized. This examination, like all CT scans performed in the [facility], was performed utilizing techniques to minimize radiation dose exposure, including the use of iterative reconstruction and   automated exposure control.     Fasting serum glucose: 71 mg/dl     FINDINGS:     VISUALIZED BRAIN:  No acute abnormalities are seen.     HEAD/NECK:  There is a physiologic distribution of FDG. No FDG avid cervical adenopathy is seen.     No significant FDG activity within the 3.2 cm right thyroid lobe nodule. Partially opacified right maxillary sinus.     CT images: Atherosclerotic calcifications of the carotid bifurcations bilaterally.     CHEST:  - FDG avid right lower lobe mass, overall measuring 3.8 x 2.2 x 1.1 cm, with the FDG avid area measuring 2.8 x 2.6 x 1.1 cm, SUV max 5.9. Associated/adjacent right lower lobe atelectasis or scar.  - Left upper lobe mixed groundglass and solid nodule with solid component  measuring 0.8 x 0.5 cm, similar to prior. No significant FDG activity, however this is may below the size threshold for FDG activity.  - Additional stable scattered groundglass nodules without significant FDG activity, including a 1.3 cm posterior right apical nodule, a 0.5 cm central right upper lobe nodule, a 1.2 cm posterior left lower lobe nodule, and a 1.0 cm anteromedial left   upper lobe nodule.     No FDG avid mediastinal lymph nodes. Bilateral hilar activity essentially symmetric; SUV max 1.4-1.5     CT images: No additional significant findings.     ABDOMEN:  No FDG avid soft tissue lesions are seen.  CT images: Unremarkable.     PELVIS:  No FDG avid soft tissue lesions are seen.  CT images: Colonic diverticulosis without evidence of acute diverticulitis. Post hysterectomy.     OSSEOUS STRUCTURES:  Scattered foci of increased periarticular FDG activity correlating with degenerative changes on CT.     No suspicious FDG avid lesions are seen.     CT images: Spinal degenerative changes. Moderate bilateral hip osteoarthritis.     IMPRESSION:     1. FDG avid right lower lobe pulmonary mass most consistent with primary pulmonary neoplasm.     2. Scattered groundglass pulmonary nodules, including a mixed groundglass and solid pulmonary nodule in the left upper lobe, without significant FDG activity, however these lesions may be below the size threshold for FDG activity and/or represent   adenocarcinoma spectrum lesions. Therefore, additional sites of lung neoplasm/metastatic disease are not excluded.     3.No FDG avid intrathoracic adenopathy.     4. The 3.2 cm right thyroid lobe nodule does not demonstrate FDG activity. Please refer to previous thyroid ultrasound report for additional follow-up recommendations        Resident: NUSRAT STAFFORD I, the attending radiologist, have reviewed the images and agree with the final report above.     Workstation performed: [id number]        Imaging    NM PET CT skull  base to mid thigh (Order: [id number]) - 4/22/2025    MRI brain w wo contrast  Status: Final result     PACS Images - GE     Show images for MRI brain w wo contrast  PACS Images - Sectra     Show images for MRI brain w wo contrast  Study Result    Narrative & Impression   MRI BRAIN WITH AND WITHOUT CONTRAST     INDICATION: lung cancer.     COMPARISON: Head CT dated 5/14/2025.     TECHNIQUE:  Multiplanar, multisequence imaging of the brain was performed before and after gadolinium administration.        IV Contrast:  6 mL of Gadobutrol injection (SINGLE-DOSE)     IMAGE QUALITY:   Diagnostic.     FINDINGS:     BRAIN PARENCHYMA: No restricted diffusion to indicate an acute infarct. No acute hemorrhage, mass, mass effect, shift or herniation. Small scattered foci of T2/FLAIR hyperintensity in the periventricular and subcortical matter due to chronic   microangiopathy.     Postcontrast imaging of the brain demonstrates no abnormal enhancement.     VENTRICLES:  Normal for the patient's age.     SELLA AND PITUITARY GLAND:  Normal.     ORBITS:  Normal.     PARANASAL SINUSES: Maxillary sinus retention cysts.     VASCULATURE:  Evaluation of the major intracranial vasculature demonstrates appropriate flow voids.     CALVARIUM AND SKULL BASE:  Normal.     EXTRACRANIAL SOFT TISSUES:  Normal.     IMPRESSION:     No acute intracranial pathology or evidence of intracranial metastatic disease.  Chronic microangiopathy.     Workstation performed: [id number]        Imaging    MRI brain w wo contrast (Order: [id number]) - 5/16/2025

## 2025-06-23 NOTE — ASSESSMENT & PLAN NOTE
Workup is in progress.  See details below.  Orders:    CBC and differential; Future    Comprehensive metabolic panel; Future    US abdomen complete; Future

## 2025-06-25 ENCOUNTER — TELEPHONE (OUTPATIENT)
Dept: HEMATOLOGY ONCOLOGY | Facility: CLINIC | Age: 80
End: 2025-06-25

## 2025-06-25 ENCOUNTER — HOSPITAL ENCOUNTER (OUTPATIENT)
Dept: ULTRASOUND IMAGING | Facility: CLINIC | Age: 80
Discharge: HOME | End: 2025-06-25
Attending: INTERNAL MEDICINE
Payer: COMMERCIAL

## 2025-06-25 DIAGNOSIS — D61.818 PANCYTOPENIA (HCC): Primary | ICD-10-CM

## 2025-06-25 DIAGNOSIS — R19.7 WATERY DIARRHEA: ICD-10-CM

## 2025-06-25 DIAGNOSIS — D61.818 PANCYTOPENIA (HCC): ICD-10-CM

## 2025-06-25 DIAGNOSIS — K90.9 INTESTINAL MALABSORPTION, UNSPECIFIED TYPE: ICD-10-CM

## 2025-06-25 LAB
SCAN RESULT: NORMAL
SCAN RESULT: NORMAL

## 2025-06-25 PROCEDURE — 76700 US EXAM ABDOM COMPLETE: CPT

## 2025-06-25 NOTE — TELEPHONE ENCOUNTER
Called and left voicemail for patient regarding an appointment tomorrow with Ban that was cancelled and rescheduled to 7/28 at 900am. Patient just saw Dr. Patel last week and per Ban she would like to see patient in 1 month from now. Also patient needs to have biweekly CBC drawn starting next week. All information provided on patient's voicemail. Left hopeline phone number on voicemail if patient needs to return call.

## 2025-06-27 PROBLEM — R89.9 ABNORMAL THYROID BIOPSY: Status: ACTIVE | Noted: 2025-06-27

## 2025-06-30 ENCOUNTER — APPOINTMENT (OUTPATIENT)
Dept: LAB | Facility: HOSPITAL | Age: 80
End: 2025-06-30
Payer: COMMERCIAL

## 2025-06-30 DIAGNOSIS — D61.818 PANCYTOPENIA (HCC): ICD-10-CM

## 2025-06-30 DIAGNOSIS — E55.9 VITAMIN D DEFICIENCY: ICD-10-CM

## 2025-06-30 DIAGNOSIS — E04.1 THYROID NODULE: ICD-10-CM

## 2025-06-30 LAB
25(OH)D3 SERPL-MCNC: 19.5 NG/ML (ref 30–100)
BASOPHILS # BLD AUTO: 0 THOUSANDS/ÂΜL (ref 0–0.1)
BASOPHILS NFR BLD AUTO: 0 % (ref 0–1)
EOSINOPHIL # BLD AUTO: 0.05 THOUSAND/ÂΜL (ref 0–0.61)
EOSINOPHIL NFR BLD AUTO: 2 % (ref 0–6)
ERYTHROCYTE [DISTWIDTH] IN BLOOD BY AUTOMATED COUNT: 15.3 % (ref 11.6–15.1)
HCT VFR BLD AUTO: 27 % (ref 34.8–46.1)
HGB BLD-MCNC: 9.1 G/DL (ref 11.5–15.4)
IMM GRANULOCYTES # BLD AUTO: 0.01 THOUSAND/UL (ref 0–0.2)
IMM GRANULOCYTES NFR BLD AUTO: 0 % (ref 0–2)
LYMPHOCYTES # BLD AUTO: 1.41 THOUSANDS/ÂΜL (ref 0.6–4.47)
LYMPHOCYTES NFR BLD AUTO: 50 % (ref 14–44)
MCH RBC QN AUTO: 38.6 PG (ref 26.8–34.3)
MCHC RBC AUTO-ENTMCNC: 33.7 G/DL (ref 31.4–37.4)
MCV RBC AUTO: 114 FL (ref 82–98)
MONOCYTES # BLD AUTO: 0.44 THOUSAND/ÂΜL (ref 0.17–1.22)
MONOCYTES NFR BLD AUTO: 15 % (ref 4–12)
NEUTROPHILS # BLD AUTO: 0.94 THOUSANDS/ÂΜL (ref 1.85–7.62)
NEUTS SEG NFR BLD AUTO: 33 % (ref 43–75)
NRBC BLD AUTO-RTO: 0 /100 WBCS
PLATELET # BLD AUTO: 39 THOUSANDS/UL (ref 149–390)
PMV BLD AUTO: 10 FL (ref 8.9–12.7)
RBC # BLD AUTO: 2.36 MILLION/UL (ref 3.81–5.12)
TSH SERPL DL<=0.05 MIU/L-ACNC: 0.84 UIU/ML (ref 0.45–4.5)
WBC # BLD AUTO: 2.85 THOUSAND/UL (ref 4.31–10.16)

## 2025-06-30 PROCEDURE — 84443 ASSAY THYROID STIM HORMONE: CPT

## 2025-06-30 PROCEDURE — 82306 VITAMIN D 25 HYDROXY: CPT

## 2025-06-30 PROCEDURE — 36415 COLL VENOUS BLD VENIPUNCTURE: CPT

## 2025-06-30 PROCEDURE — 85025 COMPLETE CBC W/AUTO DIFF WBC: CPT

## 2025-07-02 ENCOUNTER — CONSULT (OUTPATIENT)
Dept: SURGICAL ONCOLOGY | Facility: CLINIC | Age: 80
End: 2025-07-02
Payer: COMMERCIAL

## 2025-07-02 VITALS
TEMPERATURE: 97.4 F | DIASTOLIC BLOOD PRESSURE: 72 MMHG | HEART RATE: 74 BPM | OXYGEN SATURATION: 99 % | WEIGHT: 136 LBS | BODY MASS INDEX: 25.03 KG/M2 | SYSTOLIC BLOOD PRESSURE: 130 MMHG | HEIGHT: 62 IN

## 2025-07-02 DIAGNOSIS — R89.9 ABNORMAL THYROID BIOPSY: ICD-10-CM

## 2025-07-02 DIAGNOSIS — E04.1 THYROID NODULE: Primary | ICD-10-CM

## 2025-07-02 PROCEDURE — 99205 OFFICE O/P NEW HI 60 MIN: CPT | Performed by: SURGERY

## 2025-07-02 NOTE — PROGRESS NOTES
Name: Debby Barrios      : 1945      MRN: [id number]  Encounter Provider: Yvan Flores MD  Encounter Date: 2025   Encounter department: [facility] [place]  :  Assessment & Plan  Thyroid nodule    Orders:    PTH, intact; Future    CT soft tissue neck w contrast; Future    Abnormal thyroid biopsy    Orders:    PTH, intact; Future    CT soft tissue neck w contrast; Future    She is here for surgical oncology consultation with right thyroid nodule  This is a 79-year-old female with pancytopenia and lung mass and workup in the process.  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  We did discuss thyroid pathophysiology and further management.  However I emphasized that she needs further workup for lung mass this has been biopsied and negative for malignancy and inflammatory.  There is an upcoming CT scan on July 15 this will evaluate further lung mass.  She also has workup for pancytopenia.  Patient has been referred to pulmonologist by hematologist however she has not seen due to geographic location.  She knows Dr. Alaniz.  She is going to see.  I did explain she is not a candidate for thyroid surgery at this time especially given ongoing pancytopenia workup as well as lung mass.  They understand.  She is a former smoker.  We will also add a CT of the neck given the ultrasound finding of the left side possible enlarged parathyroid gland versus a lymph node.  Will also obtain her PTH level even though her calcium level is within normal limit.  She, her  as well as daughter had several questions I answered all of them.  Will see her within the next 2 months hopefully by that time we have on about the lung mass as well as ongoing pancytopenia.  I also personally discussed with medical oncology team.  They are contemplating a repeat  "bone marrow biopsy as well.  She has recently developed skin bruises probably due to low platelets.  I also emphasized that as a part of pancytopenia she should get colonoscopy done as well.  She has been referred to GI in the past she has not followed through.    History of Present Illness   Chief Complaint   Patient presents with    Consult     NP-Thyroid Nodule     Return in about 7 weeks (around 8/20/2025).    Pertinent Medical History   Pancytopenia  Right lung mass.  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   HENT:  Negative for ear pain and sore throat.    Eyes:  Negative for pain and visual disturbance.   Respiratory:  Negative for cough and shortness of breath.    Cardiovascular:  Negative for chest pain and palpitations.   Gastrointestinal:  Negative for abdominal pain and vomiting.   Genitourinary:  Negative for dysuria and hematuria.   Musculoskeletal:  Negative for arthralgias and back pain.   Skin:  Negative for color change and rash.   Neurological:  Negative for seizures and syncope.   All other systems reviewed and are negative.   A complete review of systems is negative other than that noted above in the HPI.    Medical History Reviewed by provider this encounter:  Tobacco  Allergies  Meds  Problems  Med Hx  Surg Hx  Fam Hx     .         Objective   /72 (BP Location: Left arm, Patient Position: Sitting)   Pulse 74   Temp (!) 97.4 °F (36.3 °C) (Temporal)   Ht 5' 2\" (1.575 m)   Wt 61.7 kg (136 lb)   SpO2 99%   BMI 24.87 kg/m²     Pain Screening:     ECOG    Physical Exam  Constitutional:       Appearance: Normal appearance.   HENT:      Head: Normocephalic and atraumatic.      Nose: Nose normal.      Mouth/Throat:      Mouth: Mucous membranes are moist.     Eyes:      Pupils: Pupils are equal, round, and reactive to light.     Neck:        Comments: " Palpable right thyroid nodule trachea is midline no palpable adenopathy on the right side as well as the left side no JVD  Cardiovascular:      Rate and Rhythm: Normal rate.      Pulses: Normal pulses.      Heart sounds: Normal heart sounds.   Pulmonary:      Effort: Pulmonary effort is normal.      Breath sounds: Normal breath sounds.   Abdominal:      General: Bowel sounds are normal.      Palpations: Abdomen is soft.     Musculoskeletal:         General: Normal range of motion.      Cervical back: Normal range of motion and neck supple.     Skin:     General: Skin is warm.     Neurological:      General: No focal deficit present.      Mental Status: She is alert and oriented to person, place, and time.     Psychiatric:         Mood and Affect: Mood normal.         Behavior: Behavior normal.         Thought Content: Thought content normal.         Judgment: Judgment normal.        Constitutional: General appearance: The Patient is well-developed and well-nourished who appears the stated age in no acute distress. Patient is pleasant and talkative.  HEENT: Normocephalic. Sclerae are anicteric. Mucous membranes are moist. Neck is supple without adenopathy. No JVD.  Chest: The lungs are clear to auscultation.  Cardiac: Heart is regular rate.  Abdomen: Abdomen is soft, non-tender, non-distended and without masses.  Extremities: There is no clubbing or cyanosis. There is no edema. Symmetric.  Neuro: Grossly nonfocal. Gait is normal.  Lymphatic: No evidence of cervical adenopathy bilaterally.  No evidence of axillary adenopathy bilaterally. No evidence of inguinal adenopathy bilaterally.  Skin: Warm, anicteric.  Psych: Patient is pleasant and talkative    Labs: I have reviewed pertinent labs.   Lab Results   Component Value Date/Time    WBC 2.85 (L) 06/30/2025 12:52 PM    RBC 2.36 (L) 06/30/2025 12:52 PM    Hemoglobin 9.1 (L) 06/30/2025 12:52 PM    Hematocrit 27.0 (L) 06/30/2025 12:52 PM     (H) 06/30/2025 12:52  PM    MCH 38.6 (H) 06/30/2025 12:52 PM    RDW 15.3 (H) 06/30/2025 12:52 PM    Platelets 39 (L) 06/30/2025 12:52 PM    Segmented % 33 (L) 06/30/2025 12:52 PM    Bands % 2 05/14/2025 03:20 PM    Lymphocytes % 50 (H) 06/30/2025 12:52 PM    Monocytes % 15 (H) 06/30/2025 12:52 PM    Eosinophils Relative 2 06/30/2025 12:52 PM    Basophils Relative 0 06/30/2025 12:52 PM    Immature Grans % 0 06/30/2025 12:52 PM    Absolute Neutrophils 0.94 (L) 06/30/2025 12:52 PM      Lab Results   Component Value Date/Time    Sodium 139 06/18/2025 12:47 PM    Potassium 4.8 06/18/2025 12:47 PM    Chloride 107 06/18/2025 12:47 PM    CO2 29 06/18/2025 12:47 PM    ANION GAP 3 (L) 06/18/2025 12:47 PM    BUN 14 06/18/2025 12:47 PM    Creatinine 0.80 06/18/2025 12:47 PM    Glucose 86 05/16/2025 04:53 AM    Glucose, Fasting 80 06/18/2025 12:47 PM    Calcium 8.9 06/18/2025 12:47 PM    AST 15 06/18/2025 12:47 PM    ALT 9 06/18/2025 12:47 PM    Alkaline Phosphatase 71 06/18/2025 12:47 PM    Total Protein 7.2 06/18/2025 12:47 PM    Albumin 3.9 06/18/2025 12:47 PM    Total Bilirubin 0.48 06/18/2025 12:47 PM    eGFR 71 06/18/2025 12:47 PM        Radiology Results Review: I personally reviewed the following image studies in PACS and associated radiology reports: PET/CT scan as well as thyroid ultrasound. My interpretation of the radiology images/reports is: Thyroid ultrasound thyroid nodule noted.  With regard to left side finding adjacent to thyroid lobe possible parathyroid gland versus lymph node.  We will further characterize with CT neck as well as PTH level.  With regard to lung mass need close surveillance and repeat biopsy if indicated.      Administrative Statements   I have spent a total time of 55 minutes in caring for this patient on the day of the visit/encounter including Diagnostic results, Prognosis, Risks and benefits of tx options, Instructions for management, Patient and family education, Importance of tx compliance, Risk factor  reductions, Impressions, Counseling / Coordination of care, Documenting in the medical record, Reviewing/placing orders in the medical record (including tests, medications, and/or procedures), Obtaining or reviewing history  , and Communicating with other healthcare professionals .

## 2025-07-12 ENCOUNTER — RESULTS FOLLOW-UP (OUTPATIENT)
Dept: HEMATOLOGY ONCOLOGY | Facility: CLINIC | Age: 80
End: 2025-07-12

## 2025-07-12 DIAGNOSIS — D61.818 PANCYTOPENIA (HCC): ICD-10-CM

## 2025-07-12 DIAGNOSIS — R16.0 HEPATOMEGALY: Primary | ICD-10-CM

## 2025-07-13 ENCOUNTER — RESULTS FOLLOW-UP (OUTPATIENT)
Dept: HEMATOLOGY ONCOLOGY | Facility: CLINIC | Age: 80
End: 2025-07-13

## 2025-07-14 NOTE — TELEPHONE ENCOUNTER
Call to patient, now answer. Left message with below information. If patient returns call please relay message. Thank you!      ----- Message from Ban Devlin PA-C sent at 7/13/2025  9:25 AM EDT -----  Will need repeat CBC late next week thurs or fri prior to follow up   ----- Message -----  From: Lab, Background User  Sent: 6/30/2025   1:04 PM EDT  To: Ban Devlin PA-C

## 2025-07-15 ENCOUNTER — HOSPITAL ENCOUNTER (OUTPATIENT)
Dept: CT IMAGING | Facility: HOSPITAL | Age: 80
Discharge: HOME | End: 2025-07-15
Attending: PHYSICIAN ASSISTANT
Payer: COMMERCIAL

## 2025-07-15 DIAGNOSIS — R89.9 ABNORMAL THYROID BIOPSY: ICD-10-CM

## 2025-07-15 DIAGNOSIS — E04.1 THYROID NODULE: ICD-10-CM

## 2025-07-15 DIAGNOSIS — R91.8 LUNG MASS: ICD-10-CM

## 2025-07-15 PROCEDURE — 71250 CT THORAX DX C-: CPT

## 2025-07-15 PROCEDURE — 70491 CT SOFT TISSUE NECK W/DYE: CPT

## 2025-07-15 RX ADMIN — IOHEXOL 85 ML: 350 INJECTION, SOLUTION INTRAVENOUS at 09:52

## 2025-07-16 ENCOUNTER — TELEPHONE (OUTPATIENT)
Dept: GYNECOLOGIC ONCOLOGY | Facility: CLINIC | Age: 80
End: 2025-07-16

## 2025-07-21 ENCOUNTER — APPOINTMENT (OUTPATIENT)
Dept: LAB | Facility: HOSPITAL | Age: 80
End: 2025-07-21
Payer: COMMERCIAL

## 2025-07-21 DIAGNOSIS — R91.8 LUNG MASS: ICD-10-CM

## 2025-07-21 DIAGNOSIS — E04.1 THYROID NODULE: ICD-10-CM

## 2025-07-21 DIAGNOSIS — D61.818 PANCYTOPENIA (HCC): ICD-10-CM

## 2025-07-21 DIAGNOSIS — R89.9 ABNORMAL THYROID BIOPSY: ICD-10-CM

## 2025-07-21 LAB
BASOPHILS # BLD MANUAL: 0.03 THOUSAND/UL (ref 0–0.1)
BASOPHILS NFR MAR MANUAL: 1 % (ref 0–1)
EOSINOPHIL # BLD MANUAL: 0.12 THOUSAND/UL (ref 0–0.4)
EOSINOPHIL NFR BLD MANUAL: 4 % (ref 0–6)
ERYTHROCYTE [DISTWIDTH] IN BLOOD BY AUTOMATED COUNT: 14.3 % (ref 11.6–15.1)
HCT VFR BLD AUTO: 26.7 % (ref 34.8–46.1)
HGB BLD-MCNC: 9.1 G/DL (ref 11.5–15.4)
LYMPHOCYTES # BLD AUTO: 1.54 THOUSAND/UL (ref 0.6–4.47)
LYMPHOCYTES # BLD AUTO: 46 % (ref 14–44)
MCH RBC QN AUTO: 39.4 PG (ref 26.8–34.3)
MCHC RBC AUTO-ENTMCNC: 34.1 G/DL (ref 31.4–37.4)
MCV RBC AUTO: 116 FL (ref 82–98)
MONOCYTES # BLD AUTO: 0.25 THOUSAND/UL (ref 0–1.22)
MONOCYTES NFR BLD: 8 % (ref 4–12)
NEUTROPHILS # BLD MANUAL: 1.14 THOUSAND/UL (ref 1.85–7.62)
NEUTS SEG NFR BLD AUTO: 37 % (ref 43–75)
PLATELET # BLD AUTO: 43 THOUSANDS/UL (ref 149–390)
PLATELET BLD QL SMEAR: ABNORMAL
PMV BLD AUTO: 11 FL (ref 8.9–12.7)
PTH-INTACT SERPL-MCNC: 32.6 PG/ML (ref 12–88)
RBC # BLD AUTO: 2.31 MILLION/UL (ref 3.81–5.12)
RBC MORPH BLD: NORMAL
TSH SERPL DL<=0.05 MIU/L-ACNC: 1.37 UIU/ML (ref 0.45–4.5)
VARIANT LYMPHS # BLD AUTO: 4 %
WBC # BLD AUTO: 3.07 THOUSAND/UL (ref 4.31–10.16)

## 2025-07-21 PROCEDURE — 36415 COLL VENOUS BLD VENIPUNCTURE: CPT

## 2025-07-21 PROCEDURE — 85007 BL SMEAR W/DIFF WBC COUNT: CPT

## 2025-07-21 PROCEDURE — 85027 COMPLETE CBC AUTOMATED: CPT

## 2025-07-21 PROCEDURE — 83970 ASSAY OF PARATHORMONE: CPT

## 2025-07-21 PROCEDURE — 84443 ASSAY THYROID STIM HORMONE: CPT

## 2025-07-22 ENCOUNTER — HOSPITAL ENCOUNTER (OUTPATIENT)
Dept: ULTRASOUND IMAGING | Facility: HOSPITAL | Age: 80
Discharge: HOME | End: 2025-07-22
Attending: PHYSICIAN ASSISTANT

## 2025-07-22 DIAGNOSIS — R16.0 HEPATOMEGALY: ICD-10-CM

## 2025-07-22 DIAGNOSIS — D61.818 PANCYTOPENIA (HCC): ICD-10-CM

## 2025-07-23 ENCOUNTER — HOSPITAL ENCOUNTER (OUTPATIENT)
Dept: ULTRASOUND IMAGING | Facility: HOSPITAL | Age: 80
Discharge: HOME | End: 2025-07-23
Attending: PHYSICIAN ASSISTANT
Payer: COMMERCIAL

## 2025-07-23 PROCEDURE — 76981 USE PARENCHYMA: CPT

## 2025-07-23 NOTE — TELEPHONE ENCOUNTER
Pt's spouse said they received a call saying she needs to get more bloodwork done. Pt had labs done on 7/21. He wanted to confirm she needs bloodwork

## 2025-08-01 ENCOUNTER — TELEPHONE (OUTPATIENT)
Age: 80
End: 2025-08-01

## 2025-08-05 ENCOUNTER — OFFICE VISIT (OUTPATIENT)
Dept: CARDIAC SURGERY | Facility: CLINIC | Age: 80
End: 2025-08-05
Payer: COMMERCIAL

## 2025-08-05 VITALS
OXYGEN SATURATION: 99 % | HEART RATE: 78 BPM | HEIGHT: 62 IN | BODY MASS INDEX: 24.48 KG/M2 | SYSTOLIC BLOOD PRESSURE: 128 MMHG | DIASTOLIC BLOOD PRESSURE: 72 MMHG | WEIGHT: 133 LBS

## 2025-08-05 DIAGNOSIS — E04.1 THYROID NODULE: ICD-10-CM

## 2025-08-05 DIAGNOSIS — D61.818 PANCYTOPENIA (HCC): ICD-10-CM

## 2025-08-05 DIAGNOSIS — B44.9 ASPERGILLUS (HCC): Primary | ICD-10-CM

## 2025-08-05 PROCEDURE — 99214 OFFICE O/P EST MOD 30 MIN: CPT | Performed by: THORACIC SURGERY (CARDIOTHORACIC VASCULAR SURGERY)

## 2025-08-12 ENCOUNTER — TELEPHONE (OUTPATIENT)
Age: 80
End: 2025-08-12

## 2025-08-13 ENCOUNTER — APPOINTMENT (OUTPATIENT)
Dept: LAB | Facility: HOSPITAL | Age: 80
End: 2025-08-13
Payer: COMMERCIAL

## 2025-08-13 DIAGNOSIS — D61.818 PANCYTOPENIA (HCC): ICD-10-CM

## 2025-08-13 DIAGNOSIS — E04.1 THYROID NODULE: ICD-10-CM

## 2025-08-13 LAB
BASOPHILS # BLD AUTO: 0.01 THOUSANDS/ÂΜL (ref 0–0.1)
BASOPHILS NFR BLD AUTO: 0 % (ref 0–1)
EOSINOPHIL # BLD AUTO: 0.14 THOUSAND/ÂΜL (ref 0–0.61)
EOSINOPHIL NFR BLD AUTO: 4 % (ref 0–6)
ERYTHROCYTE [DISTWIDTH] IN BLOOD BY AUTOMATED COUNT: 13.9 % (ref 11.6–15.1)
HCT VFR BLD AUTO: 26.4 % (ref 34.8–46.1)
HGB BLD-MCNC: 8.9 G/DL (ref 11.5–15.4)
IMM GRANULOCYTES # BLD AUTO: 0.01 THOUSAND/UL (ref 0–0.2)
IMM GRANULOCYTES NFR BLD AUTO: 0 % (ref 0–2)
LDH SERPL-CCNC: 175 U/L (ref 140–271)
LYMPHOCYTES # BLD AUTO: 1.44 THOUSANDS/ÂΜL (ref 0.6–4.47)
LYMPHOCYTES NFR BLD AUTO: 39 % (ref 14–44)
MCH RBC QN AUTO: 39.7 PG (ref 26.8–34.3)
MCHC RBC AUTO-ENTMCNC: 33.7 G/DL (ref 31.4–37.4)
MCV RBC AUTO: 118 FL (ref 82–98)
MONOCYTES # BLD AUTO: 0.49 THOUSAND/ÂΜL (ref 0.17–1.22)
MONOCYTES NFR BLD AUTO: 13 % (ref 4–12)
NEUTROPHILS # BLD AUTO: 1.6 THOUSANDS/ÂΜL (ref 1.85–7.62)
NEUTS SEG NFR BLD AUTO: 44 % (ref 43–75)
NRBC BLD AUTO-RTO: 0 /100 WBCS
PLATELET # BLD AUTO: 39 THOUSANDS/UL (ref 149–390)
PMV BLD AUTO: 11.2 FL (ref 8.9–12.7)
RBC # BLD AUTO: 2.24 MILLION/UL (ref 3.81–5.12)
RETICS # AUTO: ABNORMAL 10*3/UL (ref 14097–95744)
RETICS # CALC: 2.07 % (ref 0.37–1.87)
TSH SERPL DL<=0.05 MIU/L-ACNC: 0.86 UIU/ML (ref 0.45–4.5)
WBC # BLD AUTO: 3.69 THOUSAND/UL (ref 4.31–10.16)

## 2025-08-13 PROCEDURE — 85025 COMPLETE CBC W/AUTO DIFF WBC: CPT

## 2025-08-13 PROCEDURE — 36415 COLL VENOUS BLD VENIPUNCTURE: CPT

## 2025-08-13 PROCEDURE — 84443 ASSAY THYROID STIM HORMONE: CPT

## 2025-08-13 PROCEDURE — 85045 AUTOMATED RETICULOCYTE COUNT: CPT

## 2025-08-13 PROCEDURE — 83615 LACTATE (LD) (LDH) ENZYME: CPT

## 2025-08-20 ENCOUNTER — OFFICE VISIT (OUTPATIENT)
Dept: SURGICAL ONCOLOGY | Facility: CLINIC | Age: 80
End: 2025-08-20
Payer: COMMERCIAL

## 2025-08-20 VITALS
SYSTOLIC BLOOD PRESSURE: 116 MMHG | HEIGHT: 62 IN | WEIGHT: 135 LBS | HEART RATE: 90 BPM | TEMPERATURE: 97.9 F | BODY MASS INDEX: 24.84 KG/M2 | OXYGEN SATURATION: 99 % | DIASTOLIC BLOOD PRESSURE: 66 MMHG

## 2025-08-20 DIAGNOSIS — D61.818 PANCYTOPENIA (HCC): ICD-10-CM

## 2025-08-20 DIAGNOSIS — E04.1 THYROID NODULE: Primary | ICD-10-CM

## 2025-08-20 DIAGNOSIS — R89.9 ABNORMAL THYROID BIOPSY: ICD-10-CM

## 2025-08-20 DIAGNOSIS — R91.8 LUNG MASS: ICD-10-CM

## 2025-08-20 PROCEDURE — 99215 OFFICE O/P EST HI 40 MIN: CPT | Performed by: SURGERY

## 2025-08-20 RX ORDER — MULTIVITAMIN WITH IRON
500 TABLET ORAL DAILY
COMMUNITY

## 2025-08-20 RX ORDER — DIPHENOXYLATE HYDROCHLORIDE AND ATROPINE SULFATE 2.5; .025 MG/1; MG/1
1 TABLET ORAL DAILY
COMMUNITY